# Patient Record
Sex: FEMALE | Race: WHITE | NOT HISPANIC OR LATINO | Employment: OTHER | URBAN - METROPOLITAN AREA
[De-identification: names, ages, dates, MRNs, and addresses within clinical notes are randomized per-mention and may not be internally consistent; named-entity substitution may affect disease eponyms.]

---

## 2017-08-30 ENCOUNTER — RECORDS - HEALTHEAST (OUTPATIENT)
Dept: LAB | Facility: CLINIC | Age: 72
End: 2017-08-30

## 2017-08-30 LAB
CHOLEST SERPL-MCNC: 159 MG/DL
FASTING STATUS PATIENT QL REPORTED: YES
HDLC SERPL-MCNC: 53 MG/DL
LDLC SERPL CALC-MCNC: 87 MG/DL
TRIGL SERPL-MCNC: 93 MG/DL

## 2017-12-28 ENCOUNTER — HOSPITAL ENCOUNTER (OUTPATIENT)
Dept: ULTRASOUND IMAGING | Facility: CLINIC | Age: 72
Discharge: HOME | End: 2017-12-28
Attending: FAMILY MEDICINE

## 2017-12-28 DIAGNOSIS — R60.9 SWELLING: ICD-10-CM

## 2017-12-29 ENCOUNTER — RECORDS - HEALTHEAST (OUTPATIENT)
Dept: ADMINISTRATIVE | Facility: OTHER | Age: 72
End: 2017-12-29

## 2017-12-29 ENCOUNTER — HOSPITAL ENCOUNTER (OUTPATIENT)
Dept: ULTRASOUND IMAGING | Facility: CLINIC | Age: 72
Discharge: HOME | End: 2017-12-29
Attending: FAMILY MEDICINE

## 2017-12-29 DIAGNOSIS — M79.89 FOOT SWELLING: ICD-10-CM

## 2017-12-29 DIAGNOSIS — M25.473 ANKLE SWELLING: ICD-10-CM

## 2017-12-29 DIAGNOSIS — I82.90 BLOOD CLOT IN VEIN: ICD-10-CM

## 2017-12-29 DIAGNOSIS — R20.9 COLD FEET: ICD-10-CM

## 2018-09-06 ENCOUNTER — RECORDS - HEALTHEAST (OUTPATIENT)
Dept: LAB | Facility: CLINIC | Age: 73
End: 2018-09-06

## 2018-09-06 LAB
ALBUMIN SERPL-MCNC: 3.8 G/DL (ref 3.5–5)
ALP SERPL-CCNC: 57 U/L (ref 45–120)
ALT SERPL W P-5'-P-CCNC: 20 U/L (ref 0–45)
ANION GAP SERPL CALCULATED.3IONS-SCNC: 7 MMOL/L (ref 5–18)
AST SERPL W P-5'-P-CCNC: 25 U/L (ref 0–40)
BILIRUB SERPL-MCNC: 1.1 MG/DL (ref 0–1)
BUN SERPL-MCNC: 20 MG/DL (ref 8–28)
CALCIUM SERPL-MCNC: 9.8 MG/DL (ref 8.5–10.5)
CHLORIDE BLD-SCNC: 106 MMOL/L (ref 98–107)
CHOLEST SERPL-MCNC: 152 MG/DL
CO2 SERPL-SCNC: 30 MMOL/L (ref 22–31)
CREAT SERPL-MCNC: 0.78 MG/DL (ref 0.6–1.1)
FASTING STATUS PATIENT QL REPORTED: YES
GFR SERPL CREATININE-BSD FRML MDRD: >60 ML/MIN/1.73M2
GLUCOSE BLD-MCNC: 136 MG/DL (ref 70–125)
HDLC SERPL-MCNC: 59 MG/DL
LDLC SERPL CALC-MCNC: 79 MG/DL
POTASSIUM BLD-SCNC: 4.1 MMOL/L (ref 3.5–5)
PROT SERPL-MCNC: 6 G/DL (ref 6–8)
SODIUM SERPL-SCNC: 143 MMOL/L (ref 136–145)
TRIGL SERPL-MCNC: 70 MG/DL

## 2018-09-11 ENCOUNTER — RECORDS - HEALTHEAST (OUTPATIENT)
Dept: LAB | Facility: CLINIC | Age: 73
End: 2018-09-11

## 2018-09-12 LAB — HCV AB SERPL QL IA: NEGATIVE

## 2019-09-16 ENCOUNTER — RECORDS - HEALTHEAST (OUTPATIENT)
Dept: LAB | Facility: CLINIC | Age: 74
End: 2019-09-16

## 2019-09-16 LAB
ALBUMIN SERPL-MCNC: 3.8 G/DL (ref 3.5–5)
ALP SERPL-CCNC: 57 U/L (ref 45–120)
ALT SERPL W P-5'-P-CCNC: 19 U/L (ref 0–45)
ANION GAP SERPL CALCULATED.3IONS-SCNC: 8 MMOL/L (ref 5–18)
AST SERPL W P-5'-P-CCNC: 29 U/L (ref 0–40)
BILIRUB SERPL-MCNC: 0.9 MG/DL (ref 0–1)
BUN SERPL-MCNC: 18 MG/DL (ref 8–28)
CALCIUM SERPL-MCNC: 9.8 MG/DL (ref 8.5–10.5)
CHLORIDE BLD-SCNC: 105 MMOL/L (ref 98–107)
CHOLEST SERPL-MCNC: 156 MG/DL
CO2 SERPL-SCNC: 30 MMOL/L (ref 22–31)
CREAT SERPL-MCNC: 0.83 MG/DL (ref 0.6–1.1)
FASTING STATUS PATIENT QL REPORTED: NORMAL
GFR SERPL CREATININE-BSD FRML MDRD: >60 ML/MIN/1.73M2
GLUCOSE BLD-MCNC: 148 MG/DL (ref 70–125)
HDLC SERPL-MCNC: 58 MG/DL
LDLC SERPL CALC-MCNC: 82 MG/DL
POTASSIUM BLD-SCNC: 4.1 MMOL/L (ref 3.5–5)
PROT SERPL-MCNC: 6.2 G/DL (ref 6–8)
SODIUM SERPL-SCNC: 143 MMOL/L (ref 136–145)
TRIGL SERPL-MCNC: 80 MG/DL

## 2019-10-22 ENCOUNTER — RECORDS - HEALTHEAST (OUTPATIENT)
Dept: LAB | Facility: CLINIC | Age: 74
End: 2019-10-22

## 2019-10-23 LAB — HBA1C MFR BLD: 5.5 % (ref 4.2–6.1)

## 2020-09-03 ENCOUNTER — RECORDS - HEALTHEAST (OUTPATIENT)
Dept: LAB | Facility: CLINIC | Age: 75
End: 2020-09-03

## 2020-09-03 LAB
CHOLEST SERPL-MCNC: 139 MG/DL
FASTING STATUS PATIENT QL REPORTED: NORMAL
HDLC SERPL-MCNC: 52 MG/DL
LDLC SERPL CALC-MCNC: 73 MG/DL
TRIGL SERPL-MCNC: 70 MG/DL

## 2020-09-05 ENCOUNTER — RECORDS - HEALTHEAST (OUTPATIENT)
Dept: LAB | Facility: CLINIC | Age: 75
End: 2020-09-05

## 2020-09-05 LAB
SHIGA TOXIN 1: NEGATIVE
SHIGA TOXIN 2: NEGATIVE

## 2020-09-07 LAB — BACTERIA SPEC CULT: NORMAL

## 2020-10-20 ENCOUNTER — RECORDS - HEALTHEAST (OUTPATIENT)
Dept: LAB | Facility: CLINIC | Age: 75
End: 2020-10-20

## 2020-10-20 LAB
ALBUMIN SERPL-MCNC: 3.8 G/DL (ref 3.5–5)
ALP SERPL-CCNC: 60 U/L (ref 45–120)
ALT SERPL W P-5'-P-CCNC: 25 U/L (ref 0–45)
ANION GAP SERPL CALCULATED.3IONS-SCNC: 7 MMOL/L (ref 5–18)
AST SERPL W P-5'-P-CCNC: 30 U/L (ref 0–40)
BILIRUB SERPL-MCNC: 1 MG/DL (ref 0–1)
BUN SERPL-MCNC: 20 MG/DL (ref 8–28)
CALCIUM SERPL-MCNC: 9.1 MG/DL (ref 8.5–10.5)
CHLORIDE BLD-SCNC: 104 MMOL/L (ref 98–107)
CO2 SERPL-SCNC: 30 MMOL/L (ref 22–31)
CREAT SERPL-MCNC: 0.77 MG/DL (ref 0.6–1.1)
GFR SERPL CREATININE-BSD FRML MDRD: >60 ML/MIN/1.73M2
GLUCOSE BLD-MCNC: 97 MG/DL (ref 70–125)
POTASSIUM BLD-SCNC: 3.9 MMOL/L (ref 3.5–5)
PROT SERPL-MCNC: 6 G/DL (ref 6–8)
SODIUM SERPL-SCNC: 141 MMOL/L (ref 136–145)

## 2020-10-27 ENCOUNTER — RECORDS - HEALTHEAST (OUTPATIENT)
Dept: LAB | Facility: CLINIC | Age: 75
End: 2020-10-27

## 2020-10-27 LAB
FERRITIN SERPL-MCNC: 32 NG/ML (ref 10–130)
IRON SATN MFR SERPL: 44 % (ref 20–50)
IRON SERPL-MCNC: 155 UG/DL (ref 42–175)
MAGNESIUM SERPL-MCNC: 1.9 MG/DL (ref 1.8–2.6)
TIBC SERPL-MCNC: 355 UG/DL (ref 313–563)
TRANSFERRIN SERPL-MCNC: 284 MG/DL (ref 212–360)

## 2020-10-28 LAB — 25(OH)D3 SERPL-MCNC: 53.5 NG/ML (ref 30–80)

## 2021-04-21 ENCOUNTER — RECORDS - HEALTHEAST (OUTPATIENT)
Dept: ADMINISTRATIVE | Facility: OTHER | Age: 76
End: 2021-04-21

## 2021-04-27 ENCOUNTER — HOSPITAL ENCOUNTER (OUTPATIENT)
Dept: ULTRASOUND IMAGING | Facility: CLINIC | Age: 76
Discharge: HOME | End: 2021-04-27
Attending: FAMILY MEDICINE

## 2021-04-27 DIAGNOSIS — I75.023 PURPLE TOE SYNDROME OF BOTH FEET (H): ICD-10-CM

## 2021-10-21 ENCOUNTER — LAB REQUISITION (OUTPATIENT)
Dept: LAB | Facility: CLINIC | Age: 76
End: 2021-10-21

## 2021-10-21 DIAGNOSIS — I10 ESSENTIAL (PRIMARY) HYPERTENSION: ICD-10-CM

## 2021-10-21 LAB
ALBUMIN SERPL-MCNC: 3.3 G/DL (ref 3.5–5)
ALP SERPL-CCNC: 83 U/L (ref 45–120)
ALT SERPL W P-5'-P-CCNC: 17 U/L (ref 0–45)
ANION GAP SERPL CALCULATED.3IONS-SCNC: 11 MMOL/L (ref 5–18)
AST SERPL W P-5'-P-CCNC: 21 U/L (ref 0–40)
BILIRUB SERPL-MCNC: 0.6 MG/DL (ref 0–1)
BUN SERPL-MCNC: 20 MG/DL (ref 8–28)
CALCIUM SERPL-MCNC: 10.2 MG/DL (ref 8.5–10.5)
CHLORIDE BLD-SCNC: 101 MMOL/L (ref 98–107)
CHOLEST SERPL-MCNC: 140 MG/DL
CO2 SERPL-SCNC: 28 MMOL/L (ref 22–31)
CREAT SERPL-MCNC: 0.78 MG/DL (ref 0.6–1.1)
GFR SERPL CREATININE-BSD FRML MDRD: 75 ML/MIN/1.73M2
GLUCOSE BLD-MCNC: 158 MG/DL (ref 70–125)
HDLC SERPL-MCNC: 47 MG/DL
LDLC SERPL CALC-MCNC: 74 MG/DL
POTASSIUM BLD-SCNC: 4.4 MMOL/L (ref 3.5–5)
PROT SERPL-MCNC: 6 G/DL (ref 6–8)
SODIUM SERPL-SCNC: 140 MMOL/L (ref 136–145)
TRIGL SERPL-MCNC: 93 MG/DL

## 2021-10-21 PROCEDURE — 80053 COMPREHEN METABOLIC PANEL: CPT | Performed by: FAMILY MEDICINE

## 2021-10-21 PROCEDURE — 80061 LIPID PANEL: CPT | Performed by: FAMILY MEDICINE

## 2021-10-26 ENCOUNTER — LAB REQUISITION (OUTPATIENT)
Dept: LAB | Facility: CLINIC | Age: 76
End: 2021-10-26

## 2021-10-26 DIAGNOSIS — R63.4 ABNORMAL WEIGHT LOSS: ICD-10-CM

## 2021-10-26 DIAGNOSIS — M81.0 AGE-RELATED OSTEOPOROSIS WITHOUT CURRENT PATHOLOGICAL FRACTURE: ICD-10-CM

## 2021-10-26 LAB — TSH SERPL DL<=0.005 MIU/L-ACNC: 0.91 UIU/ML (ref 0.3–5)

## 2021-10-26 PROCEDURE — 82306 VITAMIN D 25 HYDROXY: CPT | Performed by: PHYSICIAN ASSISTANT

## 2021-10-26 PROCEDURE — 84443 ASSAY THYROID STIM HORMONE: CPT | Performed by: PHYSICIAN ASSISTANT

## 2021-10-27 ENCOUNTER — HOSPITAL ENCOUNTER (OUTPATIENT)
Dept: MAMMOGRAPHY | Facility: CLINIC | Age: 76
Discharge: HOME | End: 2021-10-27
Attending: PHYSICIAN ASSISTANT | Admitting: PHYSICIAN ASSISTANT
Payer: COMMERCIAL

## 2021-10-27 DIAGNOSIS — Z12.31 VISIT FOR SCREENING MAMMOGRAM: ICD-10-CM

## 2021-10-27 LAB — DEPRECATED CALCIDIOL+CALCIFEROL SERPL-MC: 62 UG/L (ref 30–80)

## 2021-10-27 PROCEDURE — 77063 BREAST TOMOSYNTHESIS BI: CPT

## 2022-10-31 ENCOUNTER — LAB REQUISITION (OUTPATIENT)
Dept: LAB | Facility: CLINIC | Age: 77
End: 2022-10-31

## 2022-10-31 DIAGNOSIS — I10 ESSENTIAL (PRIMARY) HYPERTENSION: ICD-10-CM

## 2022-10-31 LAB
ALBUMIN SERPL BCG-MCNC: 3.9 G/DL (ref 3.5–5.2)
ALP SERPL-CCNC: 86 U/L (ref 35–104)
ALT SERPL W P-5'-P-CCNC: 23 U/L (ref 10–35)
ANION GAP SERPL CALCULATED.3IONS-SCNC: 10 MMOL/L (ref 7–15)
AST SERPL W P-5'-P-CCNC: 26 U/L (ref 10–35)
BILIRUB SERPL-MCNC: 0.6 MG/DL
BUN SERPL-MCNC: 20.2 MG/DL (ref 8–23)
CALCIUM SERPL-MCNC: 9.7 MG/DL (ref 8.8–10.2)
CHLORIDE SERPL-SCNC: 102 MMOL/L (ref 98–107)
CHOLEST SERPL-MCNC: 137 MG/DL
CREAT SERPL-MCNC: 0.79 MG/DL (ref 0.51–0.95)
DEPRECATED HCO3 PLAS-SCNC: 28 MMOL/L (ref 22–29)
GFR SERPL CREATININE-BSD FRML MDRD: 78 ML/MIN/1.73M2
GLUCOSE SERPL-MCNC: 138 MG/DL (ref 70–99)
HDLC SERPL-MCNC: 54 MG/DL
LDLC SERPL CALC-MCNC: 64 MG/DL
NONHDLC SERPL-MCNC: 83 MG/DL
POTASSIUM SERPL-SCNC: 4.3 MMOL/L (ref 3.4–5.3)
PROT SERPL-MCNC: 5.9 G/DL (ref 6.4–8.3)
SODIUM SERPL-SCNC: 140 MMOL/L (ref 136–145)
TRIGL SERPL-MCNC: 95 MG/DL

## 2022-10-31 PROCEDURE — 80061 LIPID PANEL: CPT | Performed by: FAMILY MEDICINE

## 2022-10-31 PROCEDURE — 80053 COMPREHEN METABOLIC PANEL: CPT | Performed by: FAMILY MEDICINE

## 2022-10-31 PROCEDURE — 82040 ASSAY OF SERUM ALBUMIN: CPT | Performed by: FAMILY MEDICINE

## 2022-11-04 ENCOUNTER — LAB REQUISITION (OUTPATIENT)
Dept: LAB | Facility: CLINIC | Age: 77
End: 2022-11-04

## 2022-11-04 DIAGNOSIS — R41.3 OTHER AMNESIA: ICD-10-CM

## 2022-11-04 PROCEDURE — 82607 VITAMIN B-12: CPT | Performed by: FAMILY MEDICINE

## 2022-11-04 PROCEDURE — 84443 ASSAY THYROID STIM HORMONE: CPT | Performed by: FAMILY MEDICINE

## 2022-11-05 LAB
TSH SERPL DL<=0.005 MIU/L-ACNC: 0.8 UIU/ML (ref 0.3–4.2)
VIT B12 SERPL-MCNC: 1115 PG/ML (ref 232–1245)

## 2022-11-11 ENCOUNTER — HOSPITAL ENCOUNTER (OUTPATIENT)
Dept: MAMMOGRAPHY | Facility: CLINIC | Age: 77
Discharge: HOME | End: 2022-11-11
Attending: FAMILY MEDICINE | Admitting: FAMILY MEDICINE
Payer: COMMERCIAL

## 2022-11-11 DIAGNOSIS — Z12.31 VISIT FOR SCREENING MAMMOGRAM: ICD-10-CM

## 2022-11-11 PROCEDURE — 77067 SCR MAMMO BI INCL CAD: CPT

## 2023-02-05 ENCOUNTER — HEALTH MAINTENANCE LETTER (OUTPATIENT)
Age: 78
End: 2023-02-05

## 2023-02-16 ENCOUNTER — TRANSFERRED RECORDS (OUTPATIENT)
Dept: HEALTH INFORMATION MANAGEMENT | Facility: CLINIC | Age: 78
End: 2023-02-16

## 2023-02-20 PROBLEM — D12.2 BENIGN NEOPLASM OF ASCENDING COLON: Status: ACTIVE | Noted: 2022-01-06

## 2023-02-21 ENCOUNTER — TRANSITIONAL CARE UNIT VISIT (OUTPATIENT)
Dept: GERIATRICS | Facility: CLINIC | Age: 78
End: 2023-02-21
Payer: COMMERCIAL

## 2023-02-21 VITALS
DIASTOLIC BLOOD PRESSURE: 66 MMHG | WEIGHT: 114 LBS | OXYGEN SATURATION: 97 % | BODY MASS INDEX: 20.98 KG/M2 | RESPIRATION RATE: 16 BRPM | HEART RATE: 77 BPM | HEIGHT: 62 IN | SYSTOLIC BLOOD PRESSURE: 119 MMHG | TEMPERATURE: 98 F

## 2023-02-21 DIAGNOSIS — Z98.890 S/P ORIF (OPEN REDUCTION INTERNAL FIXATION) FRACTURE: ICD-10-CM

## 2023-02-21 DIAGNOSIS — G25.81 RESTLESS LEGS SYNDROME (RLS): ICD-10-CM

## 2023-02-21 DIAGNOSIS — I10 PRIMARY HYPERTENSION: ICD-10-CM

## 2023-02-21 DIAGNOSIS — D62 ABLA (ACUTE BLOOD LOSS ANEMIA): ICD-10-CM

## 2023-02-21 DIAGNOSIS — S72.002S CLOSED LEFT HIP FRACTURE, SEQUELA: Primary | ICD-10-CM

## 2023-02-21 DIAGNOSIS — Z87.81 S/P ORIF (OPEN REDUCTION INTERNAL FIXATION) FRACTURE: ICD-10-CM

## 2023-02-21 DIAGNOSIS — R52 PAIN: Primary | ICD-10-CM

## 2023-02-21 PROBLEM — F41.9 ANXIETY: Status: ACTIVE | Noted: 2023-02-21

## 2023-02-21 PROBLEM — I95.9 HYPOTENSION: Status: ACTIVE | Noted: 2023-02-20

## 2023-02-21 PROBLEM — S72.002A CLOSED LEFT HIP FRACTURE (H): Status: ACTIVE | Noted: 2023-02-15

## 2023-02-21 PROBLEM — F32.A DEPRESSION: Status: ACTIVE | Noted: 2023-02-21

## 2023-02-21 PROCEDURE — 99310 SBSQ NF CARE HIGH MDM 45: CPT | Performed by: NURSE PRACTITIONER

## 2023-02-21 RX ORDER — ATENOLOL 25 MG/1
25 TABLET ORAL DAILY
COMMUNITY
Start: 2023-02-21 | End: 2023-08-28 | Stop reason: ALTCHOICE

## 2023-02-21 RX ORDER — OXYCODONE HYDROCHLORIDE 5 MG/1
2.5-5 TABLET ORAL EVERY 6 HOURS PRN
COMMUNITY
End: 2023-02-21

## 2023-02-21 RX ORDER — LORAZEPAM 0.5 MG/1
0.5 TABLET ORAL 3 TIMES DAILY PRN
COMMUNITY
End: 2023-03-13

## 2023-02-21 RX ORDER — OXYCODONE HYDROCHLORIDE 5 MG/1
2.5-5 TABLET ORAL EVERY 6 HOURS PRN
Qty: 30 TABLET | Refills: 0 | Status: SHIPPED | OUTPATIENT
Start: 2023-02-21 | End: 2023-02-27

## 2023-02-21 RX ORDER — CYCLOSPORINE 0.5 MG/ML
1 EMULSION OPHTHALMIC EVERY 12 HOURS
COMMUNITY
Start: 2023-02-21

## 2023-02-21 RX ORDER — ACETAMINOPHEN 500 MG
1000 TABLET ORAL EVERY 6 HOURS PRN
COMMUNITY
Start: 2023-02-21 | End: 2023-02-26

## 2023-02-21 RX ORDER — ASPIRIN 81 MG/1
81 TABLET, CHEWABLE ORAL 2 TIMES DAILY WITH MEALS
COMMUNITY
Start: 2023-02-21 | End: 2023-07-24

## 2023-02-21 RX ORDER — PRAMIPEXOLE DIHYDROCHLORIDE 1 MG/1
2 TABLET ORAL AT BEDTIME
COMMUNITY
Start: 2023-02-21

## 2023-02-21 RX ORDER — MIRTAZAPINE 30 MG/1
30 TABLET, FILM COATED ORAL AT BEDTIME
COMMUNITY
Start: 2023-02-21

## 2023-02-21 RX ORDER — ALBUTEROL SULFATE 90 UG/1
1-2 AEROSOL, METERED RESPIRATORY (INHALATION) EVERY 4 HOURS PRN
COMMUNITY
Start: 2023-02-21

## 2023-02-21 RX ORDER — AMOXICILLIN 250 MG
2 CAPSULE ORAL 2 TIMES DAILY
COMMUNITY
Start: 2023-02-21 | End: 2023-07-24

## 2023-02-21 NOTE — PROGRESS NOTES
[facility]    Code Status:  FULL CODE   Visit Type:   Chief Complaint   Patient presents with     Hospital F/U     [facility] 2/15/2023 - 2/20/2023     Facility:  [facility] ([facility]) [[id number]]         HPI: Sharon Weinberg is a 77 year old female who I am seeing today for admit to the TCU. Past medical history included depression, RLS, anxiety and HTN. Pt recently hospitalized on 2/14/23 2/t to fall with left hip pain. Pt hit her head but no LOC. Pt found to have a displaced and angulated comminuted fx of the left femoral neck fracture. Pt underwent ORIF on 2/15/23. Pt treated for pain with oxycodone. ASA for DVT prophylaxis. Pt had hypotension post operatively due to acute blood loss anemia. Hgb dropped from 13.1 to 9.3. She did not require transfusion however did receive fluids for resuscitation.     Transitional Care Course: Today pt sitting up in wheelchair. Recent left hip fracture with ORIF. She is complaining of pain to her hip. She continues on oxycodone and prn tylenol. She denies any dizziness. BP appears satisfactory. Today hgb 9.4. She denies any SOB or CP. She is having regular bowel movements. She is urinating.       Assessment/Plan:     Closed left hip fracture, sequela  S/P ORIF (open reduction internal fixation) fracture  -Pt WBAT.   -Continue oxycodone prn for pain. Also schedule 5 mg BID.   -schedule tylenol 650 mg Q 6 hours.   -ASA 81 mg BID for DVT prophy.   -Surgical bandage to stay in place until follow up with Orthopedics.     ABLA (acute blood loss anemia)  -Hgb dropped from 13.1 to 9.3.   -Today hgb 9.4.   -Monitor.     Primary hypertension  -Low bp in hospital due to ABLA.   -BP appears satisfactory.   -atenolol 25 mg daily.     Restless legs syndrome (RLS)  -pramipexole 0.25 mg Q HS.     OK for PT, OT to eval and treat.   Follow up CBC and BMP.     Active Ambulatory Problems     Diagnosis Date Noted     Benign neoplasm of ascending colon 01/06/2022     History of  colonic polyps 12/07/2016     Cystocele, midline 05/07/2007     Polyp of colon 12/05/2016     Rectocele 05/07/2007     Uterovaginal prolapse, unspecified 05/07/2007     Acute blood loss anemia 02/20/2023     Anxiety 02/21/2023     Closed left hip fracture (H) 02/15/2023     Depression 02/21/2023     HTN (hypertension) 02/21/2023     Hypotension 02/20/2023     RLS (restless legs syndrome) 02/21/2023     Resolved Ambulatory Problems     Diagnosis Date Noted     No Resolved Ambulatory Problems     No Additional Past Medical History     Allergies   Allergen Reactions     Penicillins Palpitations, Anaphylaxis, Hives and Difficulty breathing     Cephalexin Hives     Doxycycline Itching and Rash     Sulfa Drugs Hives     Sulfatolamide Hives       All Meds and Allergies reviewed in the record at the facility and is the most up-to-date.    Post Discharge Medication Reconciliation Status: discharge medications reconciled and changed, per note/orders  Current Outpatient Medications   Medication Sig     acetaminophen (TYLENOL) 500 MG tablet Take 1,000 mg by mouth every 6 hours as needed     albuterol (PROAIR HFA/PROVENTIL HFA/VENTOLIN HFA) 108 (90 Base) MCG/ACT inhaler Inhale 1-2 puffs into the lungs every 4 hours as needed     aspirin (ASA) 81 MG chewable tablet Take 81 mg by mouth 2 times daily (with meals)     atenolol (TENORMIN) 25 MG tablet Take 25 mg by mouth daily     cycloSPORINE (RESTASIS) 0.05 % ophthalmic emulsion Place 1 drop into both eyes every 12 hours     LORazepam (ATIVAN) 0.5 MG tablet Take 0.5 mg by mouth 3 times daily as needed for anxiety     mirtazapine (REMERON) 30 MG tablet Take 30 mg by mouth At Bedtime     Multiple Vitamins-Minerals (MULTI VITAMIN  /MINERALS) TABS Take 1 tablet by mouth daily     oxyCODONE (ROXICODONE) 5 MG tablet Take 0.5-1 tablets (2.5-5 mg) by mouth every 6 hours as needed for severe pain (7-10)     pramipexole (MIRAPEX) 0.25 MG tablet Take 0.25 mg by mouth At Bedtime      "senna-docusate (SENOKOT-S/PERICOLACE) 8.6-50 MG tablet Take 2 tablets by mouth 2 times daily     UNABLE TO FIND Take 1 tablet by mouth 2 times daily MEDICATION NAME: Algaecal tablet; 750 mg calcium, 65 mg magnesium, 25 mcg vitamin D3     No current facility-administered medications for this visit.       REVIEW OF SYSTEMS:   10 point review of systems reviewed and pertinent positives in the HPI.     PHYSICAL EXAMINATION:  Physical Exam     Vital signs: /66   Pulse 77   Temp 98  F (36.7  C)   Resp 16   Ht 1.575 m (5' 2\")   Wt 51.7 kg (114 lb)   SpO2 97%   BMI 20.85 kg/m    General: Awake, Alert, oriented x3, appropriately, sitting up in wheelchair, simple commands, conversant  HEENT:Pink conjunctiva, anicteric sclerae, moist oral mucosa  NECK: Supple  CVS:  S1  S2, without murmur or gallop.   LUNG: Clear to auscultation, No wheezes, rales or rhonci.  BACK: No kyphosis of the thoracic spine  ABDOMEN: Soft, nontender to palpation, with positive bowel sounds  EXTREMITIES: Moves both upper and lower extremities with some limitation to LLE, 1+ pedal edema on the operative side, no calf tenderness  SKIN: Incision to left hip covered with surgical bandage.   NEUROLOGIC: Intact, pulses palpable  PSYCHIATRIC: Cognition intact      Labs:  All labs reviewed in the nursing home record and Epic   @No results found for: WBC  No results found for: RBC  No results found for: HGB  No results found for: HCT  No results found for: MCV  No results found for: MCH  No results found for: MCHC  No results found for: RDW  No results found for: PLT     @Last Comprehensive Metabolic Panel:  Sodium   Date Value Ref Range Status   10/31/2022 140 136 - 145 mmol/L Final     Potassium   Date Value Ref Range Status   10/31/2022 4.3 3.4 - 5.3 mmol/L Final   10/21/2021 4.4 3.5 - 5.0 mmol/L Final     Chloride   Date Value Ref Range Status   10/31/2022 102 98 - 107 mmol/L Final   10/21/2021 101 98 - 107 mmol/L Final     Carbon Dioxide (CO2) "   Date Value Ref Range Status   10/31/2022 28 22 - 29 mmol/L Final   10/21/2021 28 22 - 31 mmol/L Final     Anion Gap   Date Value Ref Range Status   10/31/2022 10 7 - 15 mmol/L Final   10/21/2021 11 5 - 18 mmol/L Final     Glucose   Date Value Ref Range Status   10/31/2022 138 (H) 70 - 99 mg/dL Final   10/21/2021 158 (H) 70 - 125 mg/dL Final     Urea Nitrogen   Date Value Ref Range Status   10/31/2022 20.2 8.0 - 23.0 mg/dL Final   10/21/2021 20 8 - 28 mg/dL Final     Creatinine   Date Value Ref Range Status   10/31/2022 0.79 0.51 - 0.95 mg/dL Final     GFR Estimate   Date Value Ref Range Status   10/31/2022 78 >60 mL/min/1.73m2 Final     Comment:     Effective December 21, 2021 eGFRcr in adults is calculated using the 2021 CKD-EPI creatinine equation which includes age and gender (Feliberto et al., NEJM, DOI: 10.1056/YPZTof0935122)   10/20/2020 >60 >60 mL/min/1.73m2 Final     Calcium   Date Value Ref Range Status   10/31/2022 9.7 8.8 - 10.2 mg/dL Final     Time spent for this visit was 45 minutes which included preparing for visit, reviewing H &P, operative notes, labs, mediations as well as face to face time and collaboration with nursing staff.      At the conclusion of the encounter I discussed  the results of all of the tests and the disposition with patient.   All questions were answered.  The patient acknowledged understanding and was involved in the decision making regarding the overall care plan.        This note has been dictated using voice recognition software. Any grammatical or context distortions are unintentional and inherent to the software    Electronically signed by: Penelope Hendrix CNP

## 2023-02-21 NOTE — LETTER
2/21/2023        RE: Sharon Weinberg  [address]  [facility] [address]         [facility]    Code Status:  FULL CODE   Visit Type:   Chief Complaint   Patient presents with     Hospital F/U     [facility] 2/15/2023 - 2/20/2023     Facility:  [facility] ([facility]) [[id number]]         HPI: Sharon Weinberg is a 77 year old female who I am seeing today for admit to the TCU. Past medical history included depression, RLS, anxiety and HTN. Pt recently hospitalized on 2/14/23 2/t to fall with left hip pain. Pt hit her head but no LOC. Pt found to have a displaced and angulated comminuted fx of the left femoral neck fracture. Pt underwent ORIF on 2/15/23. Pt treated for pain with oxycodone. ASA for DVT prophylaxis. Pt had hypotension post operatively due to acute blood loss anemia. Hgb dropped from 13.1 to 9.3. She did not require transfusion however did receive fluids for resuscitation.     Transitional Care Course: Today pt sitting up in wheelchair. Recent left hip fracture with ORIF. She is complaining of pain to her hip. She continues on oxycodone and prn tylenol. She denies any dizziness. BP appears satisfactory. Today hgb 9.4. She denies any SOB or CP. She is having regular bowel movements. She is urinating.       Assessment/Plan:     Closed left hip fracture, sequela  S/P ORIF (open reduction internal fixation) fracture  -Pt WBAT.   -Continue oxycodone prn for pain. Also schedule 5 mg BID.   -schedule tylenol 650 mg Q 6 hours.   -ASA 81 mg BID for DVT prophy.   -Surgical bandage to stay in place until follow up with Orthopedics.     ABLA (acute blood loss anemia)  -Hgb dropped from 13.1 to 9.3.   -Today hgb 9.4.   -Monitor.     Primary hypertension  -Low bp in hospital due to ABLA.   -BP appears satisfactory.   -atenolol 25 mg daily.     Restless legs syndrome (RLS)  -pramipexole 0.25 mg Q HS.     OK for PT, OT to eval and treat.   Follow up CBC and BMP.     Active Ambulatory  Problems     Diagnosis Date Noted     Benign neoplasm of ascending colon 01/06/2022     History of colonic polyps 12/07/2016     Cystocele, midline 05/07/2007     Polyp of colon 12/05/2016     Rectocele 05/07/2007     Uterovaginal prolapse, unspecified 05/07/2007     Acute blood loss anemia 02/20/2023     Anxiety 02/21/2023     Closed left hip fracture (H) 02/15/2023     Depression 02/21/2023     HTN (hypertension) 02/21/2023     Hypotension 02/20/2023     RLS (restless legs syndrome) 02/21/2023     Resolved Ambulatory Problems     Diagnosis Date Noted     No Resolved Ambulatory Problems     No Additional Past Medical History     Allergies   Allergen Reactions     Penicillins Palpitations, Anaphylaxis, Hives and Difficulty breathing     Cephalexin Hives     Doxycycline Itching and Rash     Sulfa Drugs Hives     Sulfatolamide Hives       All Meds and Allergies reviewed in the record at the facility and is the most up-to-date.    Post Discharge Medication Reconciliation Status: discharge medications reconciled and changed, per note/orders  Current Outpatient Medications   Medication Sig     acetaminophen (TYLENOL) 500 MG tablet Take 1,000 mg by mouth every 6 hours as needed     albuterol (PROAIR HFA/PROVENTIL HFA/VENTOLIN HFA) 108 (90 Base) MCG/ACT inhaler Inhale 1-2 puffs into the lungs every 4 hours as needed     aspirin (ASA) 81 MG chewable tablet Take 81 mg by mouth 2 times daily (with meals)     atenolol (TENORMIN) 25 MG tablet Take 25 mg by mouth daily     cycloSPORINE (RESTASIS) 0.05 % ophthalmic emulsion Place 1 drop into both eyes every 12 hours     LORazepam (ATIVAN) 0.5 MG tablet Take 0.5 mg by mouth 3 times daily as needed for anxiety     mirtazapine (REMERON) 30 MG tablet Take 30 mg by mouth At Bedtime     Multiple Vitamins-Minerals (MULTI VITAMIN  /MINERALS) TABS Take 1 tablet by mouth daily     oxyCODONE (ROXICODONE) 5 MG tablet Take 0.5-1 tablets (2.5-5 mg) by mouth every 6 hours as needed for severe  "pain (7-10)     pramipexole (MIRAPEX) 0.25 MG tablet Take 0.25 mg by mouth At Bedtime     senna-docusate (SENOKOT-S/PERICOLACE) 8.6-50 MG tablet Take 2 tablets by mouth 2 times daily     UNABLE TO FIND Take 1 tablet by mouth 2 times daily MEDICATION NAME: Algaecal tablet; 750 mg calcium, 65 mg magnesium, 25 mcg vitamin D3     No current facility-administered medications for this visit.       REVIEW OF SYSTEMS:   10 point review of systems reviewed and pertinent positives in the HPI.     PHYSICAL EXAMINATION:  Physical Exam     Vital signs: /66   Pulse 77   Temp 98  F (36.7  C)   Resp 16   Ht 1.575 m (5' 2\")   Wt 51.7 kg (114 lb)   SpO2 97%   BMI 20.85 kg/m    General: Awake, Alert, oriented x3, appropriately, sitting up in wheelchair, simple commands, conversant  HEENT:Pink conjunctiva, anicteric sclerae, moist oral mucosa  NECK: Supple  CVS:  S1  S2, without murmur or gallop.   LUNG: Clear to auscultation, No wheezes, rales or rhonci.  BACK: No kyphosis of the thoracic spine  ABDOMEN: Soft, nontender to palpation, with positive bowel sounds  EXTREMITIES: Moves both upper and lower extremities with some limitation to LLE, 1+ pedal edema on the operative side, no calf tenderness  SKIN: Incision to left hip covered with surgical bandage.   NEUROLOGIC: Intact, pulses palpable  PSYCHIATRIC: Cognition intact      Labs:  All labs reviewed in the nursing home record and Epic   @No results found for: WBC  No results found for: RBC  No results found for: HGB  No results found for: HCT  No results found for: MCV  No results found for: MCH  No results found for: MCHC  No results found for: RDW  No results found for: PLT     @Last Comprehensive Metabolic Panel:  Sodium   Date Value Ref Range Status   10/31/2022 140 136 - 145 mmol/L Final     Potassium   Date Value Ref Range Status   10/31/2022 4.3 3.4 - 5.3 mmol/L Final   10/21/2021 4.4 3.5 - 5.0 mmol/L Final     Chloride   Date Value Ref Range Status   10/31/2022 " 102 98 - 107 mmol/L Final   10/21/2021 101 98 - 107 mmol/L Final     Carbon Dioxide (CO2)   Date Value Ref Range Status   10/31/2022 28 22 - 29 mmol/L Final   10/21/2021 28 22 - 31 mmol/L Final     Anion Gap   Date Value Ref Range Status   10/31/2022 10 7 - 15 mmol/L Final   10/21/2021 11 5 - 18 mmol/L Final     Glucose   Date Value Ref Range Status   10/31/2022 138 (H) 70 - 99 mg/dL Final   10/21/2021 158 (H) 70 - 125 mg/dL Final     Urea Nitrogen   Date Value Ref Range Status   10/31/2022 20.2 8.0 - 23.0 mg/dL Final   10/21/2021 20 8 - 28 mg/dL Final     Creatinine   Date Value Ref Range Status   10/31/2022 0.79 0.51 - 0.95 mg/dL Final     GFR Estimate   Date Value Ref Range Status   10/31/2022 78 >60 mL/min/1.73m2 Final     Comment:     Effective December 21, 2021 eGFRcr in adults is calculated using the 2021 CKD-EPI creatinine equation which includes age and gender (Feliberto et al., NEJM, DOI: 10.1056/PBVVsp8750917)   10/20/2020 >60 >60 mL/min/1.73m2 Final     Calcium   Date Value Ref Range Status   10/31/2022 9.7 8.8 - 10.2 mg/dL Final     Time spent for this visit was 45 minutes which included preparing for visit, reviewing H &P, operative notes, labs, mediations as well as face to face time and collaboration with nursing staff.      At the conclusion of the encounter I discussed  the results of all of the tests and the disposition with patient.   All questions were answered.  The patient acknowledged understanding and was involved in the decision making regarding the overall care plan.        This note has been dictated using voice recognition software. Any grammatical or context distortions are unintentional and inherent to the software    Electronically signed by: Penelope Hendrix CNP         Sincerely,        Penelope Hendrix NP

## 2023-02-22 ENCOUNTER — LAB REQUISITION (OUTPATIENT)
Dept: LAB | Facility: CLINIC | Age: 78
End: 2023-02-22
Payer: COMMERCIAL

## 2023-02-22 DIAGNOSIS — Z47.89 ENCOUNTER FOR OTHER ORTHOPEDIC AFTERCARE: ICD-10-CM

## 2023-02-23 LAB
ANION GAP SERPL CALCULATED.3IONS-SCNC: 8 MMOL/L (ref 7–15)
BUN SERPL-MCNC: 15 MG/DL (ref 8–23)
CALCIUM SERPL-MCNC: 8.6 MG/DL (ref 8.8–10.2)
CHLORIDE SERPL-SCNC: 108 MMOL/L (ref 98–107)
CREAT SERPL-MCNC: 0.68 MG/DL (ref 0.51–0.95)
DEPRECATED HCO3 PLAS-SCNC: 24 MMOL/L (ref 22–29)
ERYTHROCYTE [DISTWIDTH] IN BLOOD BY AUTOMATED COUNT: 13.8 % (ref 10–15)
GFR SERPL CREATININE-BSD FRML MDRD: 90 ML/MIN/1.73M2
GLUCOSE SERPL-MCNC: 89 MG/DL (ref 70–99)
HCT VFR BLD AUTO: 27.5 % (ref 35–47)
HGB BLD-MCNC: 8.4 G/DL (ref 11.7–15.7)
MCH RBC QN AUTO: 31.8 PG (ref 26.5–33)
MCHC RBC AUTO-ENTMCNC: 30.5 G/DL (ref 31.5–36.5)
MCV RBC AUTO: 104 FL (ref 78–100)
PLATELET # BLD AUTO: 275 10E3/UL (ref 150–450)
POTASSIUM SERPL-SCNC: 4.1 MMOL/L (ref 3.4–5.3)
RBC # BLD AUTO: 2.64 10E6/UL (ref 3.8–5.2)
SODIUM SERPL-SCNC: 140 MMOL/L (ref 136–145)
WBC # BLD AUTO: 8.1 10E3/UL (ref 4–11)

## 2023-02-23 PROCEDURE — P9603 ONE-WAY ALLOW PRORATED MILES: HCPCS | Mod: ORL | Performed by: NURSE PRACTITIONER

## 2023-02-23 PROCEDURE — 85027 COMPLETE CBC AUTOMATED: CPT | Mod: ORL | Performed by: NURSE PRACTITIONER

## 2023-02-23 PROCEDURE — 36415 COLL VENOUS BLD VENIPUNCTURE: CPT | Mod: ORL | Performed by: NURSE PRACTITIONER

## 2023-02-23 PROCEDURE — 80048 BASIC METABOLIC PNL TOTAL CA: CPT | Mod: ORL | Performed by: NURSE PRACTITIONER

## 2023-02-24 ENCOUNTER — DOCUMENTATION ONLY (OUTPATIENT)
Dept: GERIATRICS | Facility: CLINIC | Age: 78
End: 2023-02-24
Payer: COMMERCIAL

## 2023-02-24 DIAGNOSIS — R52 PAIN: Primary | ICD-10-CM

## 2023-02-24 RX ORDER — OXYCODONE HYDROCHLORIDE 5 MG/1
5 TABLET ORAL 2 TIMES DAILY
COMMUNITY
End: 2023-02-24

## 2023-02-26 RX ORDER — ACETAMINOPHEN 325 MG/1
650 TABLET ORAL EVERY 6 HOURS PRN
COMMUNITY

## 2023-02-26 RX ORDER — OXYCODONE HYDROCHLORIDE 5 MG/1
5 TABLET ORAL 2 TIMES DAILY
Qty: 30 TABLET | Refills: 0 | Status: SHIPPED | OUTPATIENT
Start: 2023-02-26 | End: 2023-02-27

## 2023-02-27 ENCOUNTER — TRANSITIONAL CARE UNIT VISIT (OUTPATIENT)
Dept: GERIATRICS | Facility: CLINIC | Age: 78
End: 2023-02-27
Payer: COMMERCIAL

## 2023-02-27 ENCOUNTER — LAB REQUISITION (OUTPATIENT)
Dept: LAB | Facility: CLINIC | Age: 78
End: 2023-02-27
Payer: COMMERCIAL

## 2023-02-27 VITALS
HEART RATE: 87 BPM | TEMPERATURE: 97.5 F | BODY MASS INDEX: 20.98 KG/M2 | SYSTOLIC BLOOD PRESSURE: 156 MMHG | DIASTOLIC BLOOD PRESSURE: 74 MMHG | HEIGHT: 62 IN | WEIGHT: 114 LBS | RESPIRATION RATE: 18 BRPM | OXYGEN SATURATION: 93 %

## 2023-02-27 DIAGNOSIS — S72.002S CLOSED LEFT HIP FRACTURE, SEQUELA: Primary | ICD-10-CM

## 2023-02-27 DIAGNOSIS — D62 ABLA (ACUTE BLOOD LOSS ANEMIA): ICD-10-CM

## 2023-02-27 DIAGNOSIS — D62 ACUTE POSTHEMORRHAGIC ANEMIA: ICD-10-CM

## 2023-02-27 DIAGNOSIS — G25.81 RESTLESS LEGS SYNDROME (RLS): ICD-10-CM

## 2023-02-27 DIAGNOSIS — R52 PAIN: ICD-10-CM

## 2023-02-27 DIAGNOSIS — I10 PRIMARY HYPERTENSION: ICD-10-CM

## 2023-02-27 DIAGNOSIS — Z87.81 S/P ORIF (OPEN REDUCTION INTERNAL FIXATION) FRACTURE: ICD-10-CM

## 2023-02-27 DIAGNOSIS — Z98.890 S/P ORIF (OPEN REDUCTION INTERNAL FIXATION) FRACTURE: ICD-10-CM

## 2023-02-27 PROCEDURE — 99309 SBSQ NF CARE MODERATE MDM 30: CPT | Performed by: NURSE PRACTITIONER

## 2023-02-27 RX ORDER — OXYCODONE HYDROCHLORIDE 5 MG/1
TABLET ORAL
Qty: 30 TABLET | Refills: 0 | Status: SHIPPED | OUTPATIENT
Start: 2023-02-27 | End: 2023-03-13

## 2023-02-27 NOTE — LETTER
2/27/2023        RE: Sharon Weinberg  [address]  [facility] [place] [address]         [facility]    Code Status:  FULL CODE   Visit Type:   Chief Complaint   Patient presents with     TCU Follow Up     Facility:  [facility]) [[id number]]         HPI: Sharon Weinberg is a 77 year old female who I am seeing today for follow-up on the TCU. Past medical history included depression, RLS, anxiety and HTN. Pt recently hospitalized on 2/14/23 2/t to fall with left hip pain. Pt hit her head but no LOC. Pt found to have a displaced and angulated comminuted fx of the left femoral neck fracture. Pt underwent ORIF on 2/15/23. Pt treated for pain with oxycodone. ASA for DVT prophylaxis. Pt had hypotension post operatively due to acute blood loss anemia. Hgb dropped from 13.1 to 9.3. She did not require transfusion however did receive fluids for resuscitation.     Transitional Care Course: Today pt sitting up in wheelchair. Recent left hip fracture with ORIF.  Pain controlled with oxycodone and Tylenol.  She is moving slightly better from last visit.  She continues with some bruising around the lower incision and knee.  She is on aspirin for DVT prophylactics.  Recent hemoglobin 9.4.    Assessment/Plan: Reviewed with changes.    Closed left hip fracture, sequela  S/P ORIF (open reduction internal fixation) fracture  -Pt WBAT.   -Pain control with oxycodone and Tylenol.  -ASA 81 mg BID for DVT prophy.   -Surgical bandage to stay in place until follow up with Orthopedics.   -Bruising to the distal aspect of incision.  Ice every 2 hours for 20 minutes outside of therapy.  -Follow-up with phone visit with Ortho in the a.m.  Nursing to remove sutures or staples.  Then repeat follow-up on 3/30/2023 with Ortho.    ABLA (acute blood loss anemia)  -Hgb 9.4  -Follow-up hemoglobin on Thursday.      Primary hypertension  -atenolol 25 mg daily.     Restless legs syndrome (RLS)  -pramipexole 0.25 mg  Q HS.         Active Ambulatory Problems     Diagnosis Date Noted     Benign neoplasm of ascending colon 01/06/2022     History of colonic polyps 12/07/2016     Cystocele, midline 05/07/2007     Polyp of colon 12/05/2016     Rectocele 05/07/2007     Uterovaginal prolapse, unspecified 05/07/2007     Acute blood loss anemia 02/20/2023     Anxiety 02/21/2023     Closed left hip fracture (H) 02/15/2023     Depression 02/21/2023     HTN (hypertension) 02/21/2023     Hypotension 02/20/2023     RLS (restless legs syndrome) 02/21/2023     Resolved Ambulatory Problems     Diagnosis Date Noted     No Resolved Ambulatory Problems     No Additional Past Medical History     Allergies   Allergen Reactions     Penicillins Palpitations, Anaphylaxis, Hives and Difficulty breathing     Cephalexin Hives     Doxycycline Itching and Rash     Sulfa Drugs Hives     Sulfatolamide Hives       All Meds and Allergies reviewed in the record at the facility and is the most up-to-date.    Current Outpatient Medications   Medication Sig     acetaminophen (TYLENOL) 325 MG tablet Take 650 mg by mouth every 6 hours as needed for mild pain     albuterol (PROAIR HFA/PROVENTIL HFA/VENTOLIN HFA) 108 (90 Base) MCG/ACT inhaler Inhale 1-2 puffs into the lungs every 4 hours as needed     aspirin (ASA) 81 MG chewable tablet Take 81 mg by mouth 2 times daily (with meals)     atenolol (TENORMIN) 25 MG tablet Take 25 mg by mouth daily     cycloSPORINE (RESTASIS) 0.05 % ophthalmic emulsion Place 1 drop into both eyes every 12 hours     LORazepam (ATIVAN) 0.5 MG tablet Take 0.5 mg by mouth 3 times daily as needed for anxiety     mirtazapine (REMERON) 30 MG tablet Take 30 mg by mouth At Bedtime     Multiple Vitamins-Minerals (MULTI VITAMIN  /MINERALS) TABS Take 1 tablet by mouth daily     oxyCODONE (ROXICODONE) 5 MG tablet Take 1 tablet (5 mg) by mouth 2 times daily. May also take 0.5-1 tablets (2.5-5 mg) every 6 hours as needed for severe pain (7-10).      "pramipexole (MIRAPEX) 0.25 MG tablet Take 0.25 mg by mouth At Bedtime     senna-docusate (SENOKOT-S/PERICOLACE) 8.6-50 MG tablet Take 2 tablets by mouth 2 times daily     UNABLE TO FIND Take 1 tablet by mouth 2 times daily MEDICATION NAME: Algaecal tablet; 750 mg calcium, 65 mg magnesium, 25 mcg vitamin D3     No current facility-administered medications for this visit.       REVIEW OF SYSTEMS:   10 point review of systems reviewed and pertinent positives in the HPI.     PHYSICAL EXAMINATION:  Physical Exam     Vital signs: BP (!) 156/74   Pulse 87   Temp 97.5  F (36.4  C)   Resp 18   Ht 1.575 m (5' 2\")   Wt 51.7 kg (114 lb)   SpO2 93%   BMI 20.85 kg/m    General: Awake, Alert, oriented x3, appropriately, sitting up in wheelchair, simple commands, conversant  HEENT:Pink conjunctiva, anicteric sclerae, moist oral mucosa  NECK: Supple  CVS:  S1  S2, without murmur or gallop.   LUNG: Clear to auscultation, No wheezes, rales or rhonci.  BACK: No kyphosis of the thoracic spine  ABDOMEN: Soft,  with positive bowel sounds  EXTREMITIES: Moves both upper and lower extremities with some limitation to LLE, 1+ pedal edema on the operative side, no calf tenderness  SKIN: Incision to left hip covered with surgical bandage.  Bruising to the distal aspect of the incision around the knee.  NEUROLOGIC: Intact, pulses palpable  PSYCHIATRIC: Cognition intact      Labs:  All labs reviewed in the nursing home record and [facility]     @Last Comprehensive Metabolic Panel:  Sodium   Date Value Ref Range Status   02/23/2023 140 136 - 145 mmol/L Final     Potassium   Date Value Ref Range Status   02/23/2023 4.1 3.4 - 5.3 mmol/L Final   10/21/2021 4.4 3.5 - 5.0 mmol/L Final     Chloride   Date Value Ref Range Status   02/23/2023 108 (H) 98 - 107 mmol/L Final   10/21/2021 101 98 - 107 mmol/L Final     Carbon Dioxide (CO2)   Date Value Ref Range Status   02/23/2023 24 22 - 29 mmol/L Final   10/21/2021 28 22 - 31 mmol/L Final     Anion Gap "   Date Value Ref Range Status   02/23/2023 8 7 - 15 mmol/L Final   10/21/2021 11 5 - 18 mmol/L Final     Glucose   Date Value Ref Range Status   02/23/2023 89 70 - 99 mg/dL Final   10/21/2021 158 (H) 70 - 125 mg/dL Final     Urea Nitrogen   Date Value Ref Range Status   02/23/2023 15.0 8.0 - 23.0 mg/dL Final   10/21/2021 20 8 - 28 mg/dL Final     Creatinine   Date Value Ref Range Status   02/23/2023 0.68 0.51 - 0.95 mg/dL Final     GFR Estimate   Date Value Ref Range Status   02/23/2023 90 >60 mL/min/1.73m2 Final     Comment:     eGFR calculated using 2021 CKD-EPI equation.   10/20/2020 >60 >60 mL/min/1.73m2 Final     Calcium   Date Value Ref Range Status   02/23/2023 8.6 (L) 8.8 - 10.2 mg/dL Final     This note has been dictated using voice recognition software. Any grammatical or context distortions are unintentional and inherent to the software    Electronically signed by: Penelope Hendrix, [facility]         Sincerely,        Penelope Hendrix, NP

## 2023-02-28 LAB — HGB BLD-MCNC: 10.1 G/DL (ref 11.7–15.7)

## 2023-02-28 PROCEDURE — 85018 HEMOGLOBIN: CPT | Mod: ORL | Performed by: FAMILY MEDICINE

## 2023-02-28 PROCEDURE — P9604 ONE-WAY ALLOW PRORATED TRIP: HCPCS | Mod: ORL | Performed by: FAMILY MEDICINE

## 2023-02-28 PROCEDURE — 36415 COLL VENOUS BLD VENIPUNCTURE: CPT | Mod: ORL | Performed by: FAMILY MEDICINE

## 2023-02-28 NOTE — PROGRESS NOTES
[facility] GERIATRIC SERVICES    Code Status:  FULL CODE   Visit Type:   Chief Complaint   Patient presents with     TCU Follow Up     Facility:  [facility] ([facility]) [[id number]]         HPI: Sharon Weinberg is a 77 year old female who I am seeing today for follow-up on the TCU. Past medical history included depression, RLS, anxiety and HTN. Pt recently hospitalized on 2/14/23 2/t to fall with left hip pain. Pt hit her head but no LOC. Pt found to have a displaced and angulated comminuted fx of the left femoral neck fracture. Pt underwent ORIF on 2/15/23. Pt treated for pain with oxycodone. ASA for DVT prophylaxis. Pt had hypotension post operatively due to acute blood loss anemia. Hgb dropped from 13.1 to 9.3. She did not require transfusion however did receive fluids for resuscitation.     Transitional Care Course: Today pt sitting up in wheelchair. Recent left hip fracture with ORIF.  Pain controlled with oxycodone and Tylenol.  She is moving slightly better from last visit.  She continues with some bruising around the lower incision and knee.  She is on aspirin for DVT prophylactics.  Recent hemoglobin 9.4.    Assessment/Plan: Reviewed with changes.    Closed left hip fracture, sequela  S/P ORIF (open reduction internal fixation) fracture  -Pt WBAT.   -Pain control with oxycodone and Tylenol.  -ASA 81 mg BID for DVT prophy.   -Surgical bandage to stay in place until follow up with Orthopedics.   -Bruising to the distal aspect of incision.  Ice every 2 hours for 20 minutes outside of therapy.  -Follow-up with phone visit with Ortho in the a.m.  Nursing to remove sutures or staples.  Then repeat follow-up on 3/30/2023 with Ortho.    ABLA (acute blood loss anemia)  -Hgb 9.4  -Follow-up hemoglobin on Thursday.      Primary hypertension  -atenolol 25 mg daily.     Restless legs syndrome (RLS)  -pramipexole 0.25 mg Q HS.         Active Ambulatory Problems     Diagnosis Date Noted     Benign neoplasm of  ascending colon 01/06/2022     History of colonic polyps 12/07/2016     Cystocele, midline 05/07/2007     Polyp of colon 12/05/2016     Rectocele 05/07/2007     Uterovaginal prolapse, unspecified 05/07/2007     Acute blood loss anemia 02/20/2023     Anxiety 02/21/2023     Closed left hip fracture (H) 02/15/2023     Depression 02/21/2023     HTN (hypertension) 02/21/2023     Hypotension 02/20/2023     RLS (restless legs syndrome) 02/21/2023     Resolved Ambulatory Problems     Diagnosis Date Noted     No Resolved Ambulatory Problems     No Additional Past Medical History     Allergies   Allergen Reactions     Penicillins Palpitations, Anaphylaxis, Hives and Difficulty breathing     Cephalexin Hives     Doxycycline Itching and Rash     Sulfa Drugs Hives     Sulfatolamide Hives       All Meds and Allergies reviewed in the record at the facility and is the most up-to-date.    Current Outpatient Medications   Medication Sig     acetaminophen (TYLENOL) 325 MG tablet Take 650 mg by mouth every 6 hours as needed for mild pain     albuterol (PROAIR HFA/PROVENTIL HFA/VENTOLIN HFA) 108 (90 Base) MCG/ACT inhaler Inhale 1-2 puffs into the lungs every 4 hours as needed     aspirin (ASA) 81 MG chewable tablet Take 81 mg by mouth 2 times daily (with meals)     atenolol (TENORMIN) 25 MG tablet Take 25 mg by mouth daily     cycloSPORINE (RESTASIS) 0.05 % ophthalmic emulsion Place 1 drop into both eyes every 12 hours     LORazepam (ATIVAN) 0.5 MG tablet Take 0.5 mg by mouth 3 times daily as needed for anxiety     mirtazapine (REMERON) 30 MG tablet Take 30 mg by mouth At Bedtime     Multiple Vitamins-Minerals (MULTI VITAMIN  /MINERALS) TABS Take 1 tablet by mouth daily     oxyCODONE (ROXICODONE) 5 MG tablet Take 1 tablet (5 mg) by mouth 2 times daily. May also take 0.5-1 tablets (2.5-5 mg) every 6 hours as needed for severe pain (7-10).     pramipexole (MIRAPEX) 0.25 MG tablet Take 0.25 mg by mouth At Bedtime     senna-docusate  "(SENOKOT-S/PERICOLACE) 8.6-50 MG tablet Take 2 tablets by mouth 2 times daily     UNABLE TO FIND Take 1 tablet by mouth 2 times daily MEDICATION NAME: Algaecal tablet; 750 mg calcium, 65 mg magnesium, 25 mcg vitamin D3     No current facility-administered medications for this visit.       REVIEW OF SYSTEMS:   10 point review of systems reviewed and pertinent positives in the HPI.     PHYSICAL EXAMINATION:  Physical Exam     Vital signs: BP (!) 156/74   Pulse 87   Temp 97.5  F (36.4  C)   Resp 18   Ht 1.575 m (5' 2\")   Wt 51.7 kg (114 lb)   SpO2 93%   BMI 20.85 kg/m    General: Awake, Alert, oriented x3, appropriately, sitting up in wheelchair, simple commands, conversant  HEENT:Pink conjunctiva, anicteric sclerae, moist oral mucosa  NECK: Supple  CVS:  S1  S2, without murmur or gallop.   LUNG: Clear to auscultation, No wheezes, rales or rhonci.  BACK: No kyphosis of the thoracic spine  ABDOMEN: Soft,  with positive bowel sounds  EXTREMITIES: Moves both upper and lower extremities with some limitation to LLE, 1+ pedal edema on the operative side, no calf tenderness  SKIN: Incision to left hip covered with surgical bandage.  Bruising to the distal aspect of the incision around the knee.  NEUROLOGIC: Intact, pulses palpable  PSYCHIATRIC: Cognition intact      Labs:  All labs reviewed in the nursing home record and [facility]     @Last Comprehensive Metabolic Panel:  Sodium   Date Value Ref Range Status   02/23/2023 140 136 - 145 mmol/L Final     Potassium   Date Value Ref Range Status   02/23/2023 4.1 3.4 - 5.3 mmol/L Final   10/21/2021 4.4 3.5 - 5.0 mmol/L Final     Chloride   Date Value Ref Range Status   02/23/2023 108 (H) 98 - 107 mmol/L Final   10/21/2021 101 98 - 107 mmol/L Final     Carbon Dioxide (CO2)   Date Value Ref Range Status   02/23/2023 24 22 - 29 mmol/L Final   10/21/2021 28 22 - 31 mmol/L Final     Anion Gap   Date Value Ref Range Status   02/23/2023 8 7 - 15 mmol/L Final   10/21/2021 11 5 - 18 " mmol/L Final     Glucose   Date Value Ref Range Status   02/23/2023 89 70 - 99 mg/dL Final   10/21/2021 158 (H) 70 - 125 mg/dL Final     Urea Nitrogen   Date Value Ref Range Status   02/23/2023 15.0 8.0 - 23.0 mg/dL Final   10/21/2021 20 8 - 28 mg/dL Final     Creatinine   Date Value Ref Range Status   02/23/2023 0.68 0.51 - 0.95 mg/dL Final     GFR Estimate   Date Value Ref Range Status   02/23/2023 90 >60 mL/min/1.73m2 Final     Comment:     eGFR calculated using 2021 CKD-EPI equation.   10/20/2020 >60 >60 mL/min/1.73m2 Final     Calcium   Date Value Ref Range Status   02/23/2023 8.6 (L) 8.8 - 10.2 mg/dL Final     This note has been dictated using voice recognition software. Any grammatical or context distortions are unintentional and inherent to the software    Electronically signed by: Penelope Hendrix, CNP

## 2023-03-02 ENCOUNTER — TRANSITIONAL CARE UNIT VISIT (OUTPATIENT)
Dept: GERIATRICS | Facility: CLINIC | Age: 78
End: 2023-03-02
Payer: COMMERCIAL

## 2023-03-02 VITALS
SYSTOLIC BLOOD PRESSURE: 133 MMHG | WEIGHT: 111.2 LBS | BODY MASS INDEX: 20.46 KG/M2 | DIASTOLIC BLOOD PRESSURE: 63 MMHG | RESPIRATION RATE: 18 BRPM | HEART RATE: 67 BPM | TEMPERATURE: 98.1 F | HEIGHT: 62 IN | OXYGEN SATURATION: 100 %

## 2023-03-02 DIAGNOSIS — D62 ABLA (ACUTE BLOOD LOSS ANEMIA): ICD-10-CM

## 2023-03-02 DIAGNOSIS — M79.662 PAIN AND SWELLING OF LEFT LOWER LEG: ICD-10-CM

## 2023-03-02 DIAGNOSIS — M79.89 PAIN AND SWELLING OF LEFT LOWER LEG: ICD-10-CM

## 2023-03-02 DIAGNOSIS — I10 PRIMARY HYPERTENSION: ICD-10-CM

## 2023-03-02 DIAGNOSIS — Z87.81 S/P ORIF (OPEN REDUCTION INTERNAL FIXATION) FRACTURE: ICD-10-CM

## 2023-03-02 DIAGNOSIS — S72.002S CLOSED LEFT HIP FRACTURE, SEQUELA: Primary | ICD-10-CM

## 2023-03-02 DIAGNOSIS — Z98.890 S/P ORIF (OPEN REDUCTION INTERNAL FIXATION) FRACTURE: ICD-10-CM

## 2023-03-02 PROCEDURE — 99309 SBSQ NF CARE MODERATE MDM 30: CPT | Performed by: NURSE PRACTITIONER

## 2023-03-02 NOTE — LETTER
3/2/2023        RE: Sharon Weinberg  [address]  [facility] [address]         HEALTH GERIATRIC SERVICES    Code Status:  FULL CODE   Visit Type:   Chief Complaint   Patient presents with     TCU Follow Up     Facility:  [facility]) [[id number]]         HPI: Sharon Weinberg is a 77 year old female who I am seeing today for follow-up on the TCU. Past medical history included depression, RLS, anxiety and HTN. Pt recently hospitalized on 2/14/23 2/t to fall with left hip pain. Pt hit her head but no LOC. Pt found to have a displaced and angulated comminuted fx of the left femoral neck fracture. Pt underwent ORIF on 2/15/23. Pt treated for pain with oxycodone. ASA for DVT prophylaxis. Pt had hypotension post operatively due to acute blood loss anemia. Hgb dropped from 13.1 to 9.3. She did not require transfusion however did receive fluids for resuscitation.     Transitional Care Course: Today pt sitting up in wheelchair. Recent left hip fracture with ORIF. Patient with increased pain and swelling in her left lower extremity.  She does complain of some pain with dorsi and plantarflexion.  She continues on aspirin 81 mg twice daily for DVT prophylactics.  No redness or warmth.  Pain control with oxycodone and Tylenol.  Significant bruising around the distal incision site and knee.  Recent hemoglobin 9.4.    Assessment/Plan:     Closed left hip fracture, sequela  S/P ORIF (open reduction internal fixation) fracture  -Pt WBAT.   -Pain control with oxycodone and Tylenol.  -ASA 81 mg BID for DVT prophy.   -Surgical bandage to stay in place until follow up with Orthopedics.   -Bruising to the distal aspect of incision.  Ice every 2 hours for 20 minutes outside of therapy.  -Follow-up with phone visit with Ortho in the a.m.  Nursing to remove sutures or staples.  Then repeat follow-up on 3/30/2023 with Ortho.    Swelling and pain of left lower leg  -Venous Doppler to left lower extremity rule  out DVT  -Tubigrip on in the a.m. off at at bedtime  -Elevate  -Early ambulation.    ABLA (acute blood loss anemia)  -Hgb 9.4  -Follow-up hemoglobin 10.1      Primary hypertension  -atenolol 25 mg daily.   -BP satisfactory      Active Ambulatory Problems     Diagnosis Date Noted     Benign neoplasm of ascending colon 01/06/2022     History of colonic polyps 12/07/2016     Cystocele, midline 05/07/2007     Polyp of colon 12/05/2016     Rectocele 05/07/2007     Uterovaginal prolapse, unspecified 05/07/2007     Acute blood loss anemia 02/20/2023     Anxiety 02/21/2023     Closed left hip fracture (H) 02/15/2023     Depression 02/21/2023     HTN (hypertension) 02/21/2023     Hypotension 02/20/2023     RLS (restless legs syndrome) 02/21/2023     Resolved Ambulatory Problems     Diagnosis Date Noted     No Resolved Ambulatory Problems     No Additional Past Medical History     Allergies   Allergen Reactions     Penicillins Palpitations, Anaphylaxis, Hives and Difficulty breathing     Cephalexin Hives     Doxycycline Itching and Rash     Sulfa Drugs Hives     Sulfatolamide Hives       All Meds and Allergies reviewed in the record at the facility and is the most up-to-date.    Current Outpatient Medications   Medication Sig     acetaminophen (TYLENOL) 325 MG tablet Take 650 mg by mouth every 6 hours as needed for mild pain     albuterol (PROAIR HFA/PROVENTIL HFA/VENTOLIN HFA) 108 (90 Base) MCG/ACT inhaler Inhale 1-2 puffs into the lungs every 4 hours as needed     aspirin (ASA) 81 MG chewable tablet Take 81 mg by mouth 2 times daily (with meals)     atenolol (TENORMIN) 25 MG tablet Take 25 mg by mouth daily     cycloSPORINE (RESTASIS) 0.05 % ophthalmic emulsion Place 1 drop into both eyes every 12 hours     LORazepam (ATIVAN) 0.5 MG tablet Take 0.5 mg by mouth 3 times daily as needed for anxiety     mirtazapine (REMERON) 30 MG tablet Take 30 mg by mouth At Bedtime     Multiple Vitamins-Minerals (MULTI VITAMIN  /MINERALS)  "TABS Take 1 tablet by mouth daily     oxyCODONE (ROXICODONE) 5 MG tablet Take 1 tablet (5 mg) by mouth 2 times daily. May also take 0.5-1 tablets (2.5-5 mg) every 6 hours as needed for severe pain (7-10).     pramipexole (MIRAPEX) 0.25 MG tablet Take 0.25 mg by mouth At Bedtime     senna-docusate (SENOKOT-S/PERICOLACE) 8.6-50 MG tablet Take 2 tablets by mouth 2 times daily     UNABLE TO FIND Take 1 tablet by mouth 2 times daily MEDICATION NAME: Algaecal tablet; 750 mg calcium, 65 mg magnesium, 25 mcg vitamin D3     No current facility-administered medications for this visit.       REVIEW OF SYSTEMS:   10 point review of systems reviewed and pertinent positives in the HPI.     PHYSICAL EXAMINATION:  Physical Exam     Vital signs: /63   Pulse 67   Temp 98.1  F (36.7  C)   Resp 18   Ht 1.575 m (5' 2\")   Wt 50.4 kg (111 lb 3.2 oz)   SpO2 100%   BMI 20.34 kg/m    General: Awake, Alert, oriented x3,sitting up in wheelchair,  conversant  HEENT:Pink conjunctiva, moist oral mucosa  NECK: Supple  CVS:  S1  S2, without murmur or gallop.   LUNG: Clear to auscultation, No wheezes, rales or rhonci.  BACK: No kyphosis of the thoracic spine  ABDOMEN: Soft,  with positive bowel sounds  EXTREMITIES: Moves both upper and lower extremities with some limitation to LLE, 2+ pedal edema on the operative side, no calf tenderness.  Slight tenderness with dorsi and plantarflexion.  SKIN: Incision to left hip covered with surgical bandage.  Bruising to the distal aspect of the incision around the knee.  NEUROLOGIC: Intact, pulses palpable  PSYCHIATRIC: Cognition intact      Labs:  All labs reviewed in the nursing home record and [facility]     @Last Comprehensive Metabolic Panel:  Sodium   Date Value Ref Range Status   02/23/2023 140 136 - 145 mmol/L Final     Potassium   Date Value Ref Range Status   02/23/2023 4.1 3.4 - 5.3 mmol/L Final   10/21/2021 4.4 3.5 - 5.0 mmol/L Final     Chloride   Date Value Ref Range Status   02/23/2023 108 " (H) 98 - 107 mmol/L Final   10/21/2021 101 98 - 107 mmol/L Final     Carbon Dioxide (CO2)   Date Value Ref Range Status   02/23/2023 24 22 - 29 mmol/L Final   10/21/2021 28 22 - 31 mmol/L Final     Anion Gap   Date Value Ref Range Status   02/23/2023 8 7 - 15 mmol/L Final   10/21/2021 11 5 - 18 mmol/L Final     Glucose   Date Value Ref Range Status   02/23/2023 89 70 - 99 mg/dL Final   10/21/2021 158 (H) 70 - 125 mg/dL Final     Urea Nitrogen   Date Value Ref Range Status   02/23/2023 15.0 8.0 - 23.0 mg/dL Final   10/21/2021 20 8 - 28 mg/dL Final     Creatinine   Date Value Ref Range Status   02/23/2023 0.68 0.51 - 0.95 mg/dL Final     GFR Estimate   Date Value Ref Range Status   02/23/2023 90 >60 mL/min/1.73m2 Final     Comment:     eGFR calculated using 2021 CKD-EPI equation.   10/20/2020 >60 >60 mL/min/1.73m2 Final     Calcium   Date Value Ref Range Status   02/23/2023 8.6 (L) 8.8 - 10.2 mg/dL Final     This note has been dictated using voice recognition software. Any grammatical or context distortions are unintentional and inherent to the software    Electronically signed by: Penelope Hendrix, [employer]         Sincerely,        Penelope Hendrix, NP

## 2023-03-03 NOTE — PROGRESS NOTES
[facility] GERIATRIC SERVICES    Code Status:  FULL CODE   Visit Type:   Chief Complaint   Patient presents with     TCU Follow Up     Facility:  [facility] ([facility]) [[id number]]         HPI: Sharon Weinberg is a 77 year old female who I am seeing today for follow-up on the TCU. Past medical history included depression, RLS, anxiety and HTN. Pt recently hospitalized on 2/14/23 2/t to fall with left hip pain. Pt hit her head but no LOC. Pt found to have a displaced and angulated comminuted fx of the left femoral neck fracture. Pt underwent ORIF on 2/15/23. Pt treated for pain with oxycodone. ASA for DVT prophylaxis. Pt had hypotension post operatively due to acute blood loss anemia. Hgb dropped from 13.1 to 9.3. She did not require transfusion however did receive fluids for resuscitation.     Transitional Care Course: Today pt sitting up in wheelchair. Recent left hip fracture with ORIF. Patient with increased pain and swelling in her left lower extremity.  She does complain of some pain with dorsi and plantarflexion.  She continues on aspirin 81 mg twice daily for DVT prophylactics.  No redness or warmth.  Pain control with oxycodone and Tylenol.  Significant bruising around the distal incision site and knee.  Recent hemoglobin 9.4.    Assessment/Plan:     Closed left hip fracture, sequela  S/P ORIF (open reduction internal fixation) fracture  -Pt WBAT.   -Pain control with oxycodone and Tylenol.  -ASA 81 mg BID for DVT prophy.   -Surgical bandage to stay in place until follow up with Orthopedics.   -Bruising to the distal aspect of incision.  Ice every 2 hours for 20 minutes outside of therapy.  -Follow-up with phone visit with Ortho in the a.m.  Nursing to remove sutures or staples.  Then repeat follow-up on 3/30/2023 with Ortho.    Swelling and pain of left lower leg  -Venous Doppler to left lower extremity rule out DVT  -Tubigrip on in the a.m. off at at bedtime  -Elevate  -Early ambulation.    ABLA  (acute blood loss anemia)  -Hgb 9.4  -Follow-up hemoglobin 10.1      Primary hypertension  -atenolol 25 mg daily.   -BP satisfactory      Active Ambulatory Problems     Diagnosis Date Noted     Benign neoplasm of ascending colon 01/06/2022     History of colonic polyps 12/07/2016     Cystocele, midline 05/07/2007     Polyp of colon 12/05/2016     Rectocele 05/07/2007     Uterovaginal prolapse, unspecified 05/07/2007     Acute blood loss anemia 02/20/2023     Anxiety 02/21/2023     Closed left hip fracture (H) 02/15/2023     Depression 02/21/2023     HTN (hypertension) 02/21/2023     Hypotension 02/20/2023     RLS (restless legs syndrome) 02/21/2023     Resolved Ambulatory Problems     Diagnosis Date Noted     No Resolved Ambulatory Problems     No Additional Past Medical History     Allergies   Allergen Reactions     Penicillins Palpitations, Anaphylaxis, Hives and Difficulty breathing     Cephalexin Hives     Doxycycline Itching and Rash     Sulfa Drugs Hives     Sulfatolamide Hives       All Meds and Allergies reviewed in the record at the facility and is the most up-to-date.    Current Outpatient Medications   Medication Sig     acetaminophen (TYLENOL) 325 MG tablet Take 650 mg by mouth every 6 hours as needed for mild pain     albuterol (PROAIR HFA/PROVENTIL HFA/VENTOLIN HFA) 108 (90 Base) MCG/ACT inhaler Inhale 1-2 puffs into the lungs every 4 hours as needed     aspirin (ASA) 81 MG chewable tablet Take 81 mg by mouth 2 times daily (with meals)     atenolol (TENORMIN) 25 MG tablet Take 25 mg by mouth daily     cycloSPORINE (RESTASIS) 0.05 % ophthalmic emulsion Place 1 drop into both eyes every 12 hours     LORazepam (ATIVAN) 0.5 MG tablet Take 0.5 mg by mouth 3 times daily as needed for anxiety     mirtazapine (REMERON) 30 MG tablet Take 30 mg by mouth At Bedtime     Multiple Vitamins-Minerals (MULTI VITAMIN  /MINERALS) TABS Take 1 tablet by mouth daily     oxyCODONE (ROXICODONE) 5 MG tablet Take 1 tablet (5  "mg) by mouth 2 times daily. May also take 0.5-1 tablets (2.5-5 mg) every 6 hours as needed for severe pain (7-10).     pramipexole (MIRAPEX) 0.25 MG tablet Take 0.25 mg by mouth At Bedtime     senna-docusate (SENOKOT-S/PERICOLACE) 8.6-50 MG tablet Take 2 tablets by mouth 2 times daily     UNABLE TO FIND Take 1 tablet by mouth 2 times daily MEDICATION NAME: Algaecal tablet; 750 mg calcium, 65 mg magnesium, 25 mcg vitamin D3     No current facility-administered medications for this visit.       REVIEW OF SYSTEMS:   10 point review of systems reviewed and pertinent positives in the HPI.     PHYSICAL EXAMINATION:  Physical Exam     Vital signs: /63   Pulse 67   Temp 98.1  F (36.7  C)   Resp 18   Ht 1.575 m (5' 2\")   Wt 50.4 kg (111 lb 3.2 oz)   SpO2 100%   BMI 20.34 kg/m    General: Awake, Alert, oriented x3,sitting up in wheelchair,  conversant  HEENT:Pink conjunctiva, moist oral mucosa  NECK: Supple  CVS:  S1  S2, without murmur or gallop.   LUNG: Clear to auscultation, No wheezes, rales or rhonci.  BACK: No kyphosis of the thoracic spine  ABDOMEN: Soft,  with positive bowel sounds  EXTREMITIES: Moves both upper and lower extremities with some limitation to LLE, 2+ pedal edema on the operative side, no calf tenderness.  Slight tenderness with dorsi and plantarflexion.  SKIN: Incision to left hip covered with surgical bandage.  Bruising to the distal aspect of the incision around the knee.  NEUROLOGIC: Intact, pulses palpable  PSYCHIATRIC: Cognition intact      Labs:  All labs reviewed in the nursing home record and [facility]     @[facility] Comprehensive Metabolic Panel:  Sodium   Date Value Ref Range Status   02/23/2023 140 136 - 145 mmol/L Final     Potassium   Date Value Ref Range Status   02/23/2023 4.1 3.4 - 5.3 mmol/L Final   10/21/2021 4.4 3.5 - 5.0 mmol/L Final     Chloride   Date Value Ref Range Status   02/23/2023 108 (H) 98 - 107 mmol/L Final   10/21/2021 101 98 - 107 mmol/L Final     Carbon Dioxide (CO2) "   Date Value Ref Range Status   02/23/2023 24 22 - 29 mmol/L Final   10/21/2021 28 22 - 31 mmol/L Final     Anion Gap   Date Value Ref Range Status   02/23/2023 8 7 - 15 mmol/L Final   10/21/2021 11 5 - 18 mmol/L Final     Glucose   Date Value Ref Range Status   02/23/2023 89 70 - 99 mg/dL Final   10/21/2021 158 (H) 70 - 125 mg/dL Final     Urea Nitrogen   Date Value Ref Range Status   02/23/2023 15.0 8.0 - 23.0 mg/dL Final   10/21/2021 20 8 - 28 mg/dL Final     Creatinine   Date Value Ref Range Status   02/23/2023 0.68 0.51 - 0.95 mg/dL Final     GFR Estimate   Date Value Ref Range Status   02/23/2023 90 >60 mL/min/1.73m2 Final     Comment:     eGFR calculated using 2021 CKD-EPI equation.   10/20/2020 >60 >60 mL/min/1.73m2 Final     Calcium   Date Value Ref Range Status   02/23/2023 8.6 (L) 8.8 - 10.2 mg/dL Final     This note has been dictated using voice recognition software. Any grammatical or context distortions are unintentional and inherent to the software    Electronically signed by: Penelope Hendrix CNP

## 2023-03-06 ENCOUNTER — TRANSITIONAL CARE UNIT VISIT (OUTPATIENT)
Dept: GERIATRICS | Facility: CLINIC | Age: 78
End: 2023-03-06
Payer: COMMERCIAL

## 2023-03-06 VITALS
WEIGHT: 111.2 LBS | TEMPERATURE: 98 F | SYSTOLIC BLOOD PRESSURE: 131 MMHG | RESPIRATION RATE: 16 BRPM | HEIGHT: 62 IN | OXYGEN SATURATION: 97 % | BODY MASS INDEX: 20.46 KG/M2 | HEART RATE: 89 BPM | DIASTOLIC BLOOD PRESSURE: 70 MMHG

## 2023-03-06 DIAGNOSIS — Z87.81 S/P ORIF (OPEN REDUCTION INTERNAL FIXATION) FRACTURE: ICD-10-CM

## 2023-03-06 DIAGNOSIS — Z98.890 S/P ORIF (OPEN REDUCTION INTERNAL FIXATION) FRACTURE: ICD-10-CM

## 2023-03-06 DIAGNOSIS — S72.002S CLOSED LEFT HIP FRACTURE, SEQUELA: Primary | ICD-10-CM

## 2023-03-06 DIAGNOSIS — I10 PRIMARY HYPERTENSION: ICD-10-CM

## 2023-03-06 DIAGNOSIS — R60.0 LEG EDEMA, LEFT: ICD-10-CM

## 2023-03-06 DIAGNOSIS — D62 ABLA (ACUTE BLOOD LOSS ANEMIA): ICD-10-CM

## 2023-03-06 PROCEDURE — 99309 SBSQ NF CARE MODERATE MDM 30: CPT | Performed by: NURSE PRACTITIONER

## 2023-03-06 NOTE — LETTER
3/6/2023        RE: Sharon Weinberg  [address]  [facility] [address]           HEALTH GERIATRIC SERVICES    Code Status:  FULL CODE   Visit Type:   Chief Complaint   Patient presents with     TCU Follow Up     Facility:  [facility] ([facility]) [[id number]]         HPI: Sharon Weinberg is a 77 year old female who I am seeing today for follow-up on the TCU. Past medical history included depression, RLS, anxiety and HTN. Pt recently hospitalized on 2/14/23 2/t to fall with left hip pain. Pt hit her head but no LOC. Pt found to have a displaced and angulated comminuted fx of the left femoral neck fracture. Pt underwent ORIF on 2/15/23. Pt treated for pain with oxycodone. ASA for DVT prophylaxis. Pt had hypotension post operatively due to acute blood loss anemia. Hgb dropped from 13.1 to 9.3. She did not require transfusion however did receive fluids for resuscitation.     Transitional Care Course: Today pt sitting up in bed.  Recent left hip fracture with ORIF.  Patient to follow-up with Ortho via the phone last week.  Orders were given to remove the staples from her hip.  Nursing staff were moved to staples from the superior incision however staples remain in the distal incision.  We will DC these today.  Patient with increased swelling of the left lower extremity.  Venous Doppler was obtained which was negative for DVT.  Tubigrip was ordered.  She is not in this today.  Patient continues on aspirin 81 mg twice daily for DVT prophylactics.  Pain control with Tylenol and oxycodone.  Acute blood loss anemia.  Hemoglobin stable at 9.4.      Assessment/Plan:     Closed left hip fracture, sequela  S/P ORIF (open reduction internal fixation) fracture  -Pt WBAT.   -Pain control with oxycodone and Tylenol.  -ASA 81 mg BID for DVT prophy.   -DC remaining staples to the distal incision.  Cover with Steri-Strips.  Leave open to air.    Left leg edema  -Venous Doppler obtained which was negative for  DVT.  -Orders written last week for Tubigrip on in the a.m. off at at bedtime.  Reinforced today.  -Elevation between therapies.  -Ice between therapies.    ABLA (acute blood loss anemia)  -Hgb 9.4  -Follow-up hemoglobin 10.1      Primary hypertension  -atenolol 25 mg daily.   -BP satisfactory      Active Ambulatory Problems     Diagnosis Date Noted     Benign neoplasm of ascending colon 01/06/2022     History of colonic polyps 12/07/2016     Cystocele, midline 05/07/2007     Polyp of colon 12/05/2016     Rectocele 05/07/2007     Uterovaginal prolapse, unspecified 05/07/2007     Acute blood loss anemia 02/20/2023     Anxiety 02/21/2023     Closed left hip fracture (H) 02/15/2023     Depression 02/21/2023     HTN (hypertension) 02/21/2023     Hypotension 02/20/2023     RLS (restless legs syndrome) 02/21/2023     Resolved Ambulatory Problems     Diagnosis Date Noted     No Resolved Ambulatory Problems     No Additional Past Medical History     Allergies   Allergen Reactions     Penicillins Palpitations, Anaphylaxis, Hives and Difficulty breathing     Cephalexin Hives     Doxycycline Itching and Rash     Sulfa Drugs Hives     Sulfatolamide Hives       All Meds and Allergies reviewed in the record at the facility and is the most up-to-date.    Current Outpatient Medications   Medication Sig     acetaminophen (TYLENOL) 325 MG tablet Take 650 mg by mouth every 6 hours as needed for mild pain     albuterol (PROAIR HFA/PROVENTIL HFA/VENTOLIN HFA) 108 (90 Base) MCG/ACT inhaler Inhale 1-2 puffs into the lungs every 4 hours as needed     aspirin (ASA) 81 MG chewable tablet Take 81 mg by mouth 2 times daily (with meals)     atenolol (TENORMIN) 25 MG tablet Take 25 mg by mouth daily     cycloSPORINE (RESTASIS) 0.05 % ophthalmic emulsion Place 1 drop into both eyes every 12 hours     LORazepam (ATIVAN) 0.5 MG tablet Take 0.5 mg by mouth 3 times daily as needed for anxiety     mirtazapine (REMERON) 30 MG tablet Take 30 mg by mouth  "At Bedtime     Multiple Vitamins-Minerals (MULTI VITAMIN  /MINERALS) TABS Take 1 tablet by mouth daily     oxyCODONE (ROXICODONE) 5 MG tablet Take 1 tablet (5 mg) by mouth 2 times daily. May also take 0.5-1 tablets (2.5-5 mg) every 6 hours as needed for severe pain (7-10).     pramipexole (MIRAPEX) 0.25 MG tablet Take 0.25 mg by mouth At Bedtime     senna-docusate (SENOKOT-S/PERICOLACE) 8.6-50 MG tablet Take 2 tablets by mouth 2 times daily     UNABLE TO FIND Take 1 tablet by mouth 2 times daily MEDICATION NAME: Algaecal tablet; 750 mg calcium, 65 mg magnesium, 25 mcg vitamin D3     No current facility-administered medications for this visit.       REVIEW OF SYSTEMS:   10 point review of systems reviewed and pertinent positives in the HPI.     PHYSICAL EXAMINATION:  Physical Exam     Vital signs: /70   Pulse 89   Temp 98  F (36.7  C)   Resp 16   Ht 1.575 m (5' 2\")   Wt 50.4 kg (111 lb 3.2 oz)   SpO2 97%   BMI 20.34 kg/m    General: Awake, Alert, oriented x3,sitting up in bed,  conversant  HEENT:Pink conjunctiva, moist oral mucosa  NECK: Supple  CVS:  S1  S2, without murmur or gallop.   LUNG: Clear to auscultation, No wheezes, rales or rhonci.  BACK: No kyphosis of the thoracic spine  EXTREMITIES: Moves both upper and lower extremities with some limitation to LLE, 2+ pedal edema on the operative side, no calf tenderness.    SKIN: Superior aspect of incision open to air.  Staples in the distal incision.  Bruising to the distal aspect of the incision around the knee.  NEUROLOGIC: Intact, pulses palpable  PSYCHIATRIC: Cognition intact      Labs:  All labs reviewed in the nursing home record and Epic     @Last Comprehensive Metabolic Panel:  Sodium   Date Value Ref Range Status   02/23/2023 140 136 - 145 mmol/L Final     Potassium   Date Value Ref Range Status   02/23/2023 4.1 3.4 - 5.3 mmol/L Final   10/21/2021 4.4 3.5 - 5.0 mmol/L Final     Chloride   Date Value Ref Range Status   02/23/2023 108 (H) 98 - " 107 mmol/L Final   10/21/2021 101 98 - 107 mmol/L Final     Carbon Dioxide (CO2)   Date Value Ref Range Status   02/23/2023 24 22 - 29 mmol/L Final   10/21/2021 28 22 - 31 mmol/L Final     Anion Gap   Date Value Ref Range Status   02/23/2023 8 7 - 15 mmol/L Final   10/21/2021 11 5 - 18 mmol/L Final     Glucose   Date Value Ref Range Status   02/23/2023 89 70 - 99 mg/dL Final   10/21/2021 158 (H) 70 - 125 mg/dL Final     Urea Nitrogen   Date Value Ref Range Status   02/23/2023 15.0 8.0 - 23.0 mg/dL Final   10/21/2021 20 8 - 28 mg/dL Final     Creatinine   Date Value Ref Range Status   02/23/2023 0.68 0.51 - 0.95 mg/dL Final     GFR Estimate   Date Value Ref Range Status   02/23/2023 90 >60 mL/min/1.73m2 Final     Comment:     eGFR calculated using 2021 CKD-EPI equation.   10/20/2020 >60 >60 mL/min/1.73m2 Final     Calcium   Date Value Ref Range Status   02/23/2023 8.6 (L) 8.8 - 10.2 mg/dL Final     This note has been dictated using voice recognition software. Any grammatical or context distortions are unintentional and inherent to the software    Electronically signed by: Penelope Hendrix, TRINITY         Sincerely,        Penelope Hendrix, NP

## 2023-03-07 NOTE — PROGRESS NOTES
[facility] GERIATRIC SERVICES    Code Status:  FULL CODE   Visit Type:   Chief Complaint   Patient presents with     TCU Follow Up     Facility:  [facility] ([facility]) [[id number]]         HPI: Sharon Weinberg is a 77 year old female who I am seeing today for follow-up on the TCU. Past medical history included depression, RLS, anxiety and HTN. Pt recently hospitalized on 2/14/23 2/t to fall with left hip pain. Pt hit her head but no LOC. Pt found to have a displaced and angulated comminuted fx of the left femoral neck fracture. Pt underwent ORIF on 2/15/23. Pt treated for pain with oxycodone. ASA for DVT prophylaxis. Pt had hypotension post operatively due to acute blood loss anemia. Hgb dropped from 13.1 to 9.3. She did not require transfusion however did receive fluids for resuscitation.     Transitional Care Course: Today pt sitting up in bed.  Recent left hip fracture with ORIF.  Patient to follow-up with Ortho via the phone last week.  Orders were given to remove the staples from her hip.  Nursing staff were moved to staples from the superior incision however staples remain in the distal incision.  We will DC these today.  Patient with increased swelling of the left lower extremity.  Venous Doppler was obtained which was negative for DVT.  Tubigrip was ordered.  She is not in this today.  Patient continues on aspirin 81 mg twice daily for DVT prophylactics.  Pain control with Tylenol and oxycodone.  Acute blood loss anemia.  Hemoglobin stable at 9.4.      Assessment/Plan:     Closed left hip fracture, sequela  S/P ORIF (open reduction internal fixation) fracture  -Pt WBAT.   -Pain control with oxycodone and Tylenol.  -ASA 81 mg BID for DVT prophy.   -DC remaining staples to the distal incision.  Cover with Steri-Strips.  Leave open to air.    Left leg edema  -Venous Doppler obtained which was negative for DVT.  -Orders written last week for Tubigrip on in the a.m. off at at bedtime.  Reinforced  today.  -Elevation between therapies.  -Ice between therapies.    ABLA (acute blood loss anemia)  -Hgb 9.4  -Follow-up hemoglobin 10.1      Primary hypertension  -atenolol 25 mg daily.   -BP satisfactory      Active Ambulatory Problems     Diagnosis Date Noted     Benign neoplasm of ascending colon 01/06/2022     History of colonic polyps 12/07/2016     Cystocele, midline 05/07/2007     Polyp of colon 12/05/2016     Rectocele 05/07/2007     Uterovaginal prolapse, unspecified 05/07/2007     Acute blood loss anemia 02/20/2023     Anxiety 02/21/2023     Closed left hip fracture (H) 02/15/2023     Depression 02/21/2023     HTN (hypertension) 02/21/2023     Hypotension 02/20/2023     RLS (restless legs syndrome) 02/21/2023     Resolved Ambulatory Problems     Diagnosis Date Noted     No Resolved Ambulatory Problems     No Additional Past Medical History     Allergies   Allergen Reactions     Penicillins Palpitations, Anaphylaxis, Hives and Difficulty breathing     Cephalexin Hives     Doxycycline Itching and Rash     Sulfa Drugs Hives     Sulfatolamide Hives       All Meds and Allergies reviewed in the record at the facility and is the most up-to-date.    Current Outpatient Medications   Medication Sig     acetaminophen (TYLENOL) 325 MG tablet Take 650 mg by mouth every 6 hours as needed for mild pain     albuterol (PROAIR HFA/PROVENTIL HFA/VENTOLIN HFA) 108 (90 Base) MCG/ACT inhaler Inhale 1-2 puffs into the lungs every 4 hours as needed     aspirin (ASA) 81 MG chewable tablet Take 81 mg by mouth 2 times daily (with meals)     atenolol (TENORMIN) 25 MG tablet Take 25 mg by mouth daily     cycloSPORINE (RESTASIS) 0.05 % ophthalmic emulsion Place 1 drop into both eyes every 12 hours     LORazepam (ATIVAN) 0.5 MG tablet Take 0.5 mg by mouth 3 times daily as needed for anxiety     mirtazapine (REMERON) 30 MG tablet Take 30 mg by mouth At Bedtime     Multiple Vitamins-Minerals (MULTI VITAMIN  /MINERALS) TABS Take 1 tablet by  "mouth daily     oxyCODONE (ROXICODONE) 5 MG tablet Take 1 tablet (5 mg) by mouth 2 times daily. May also take 0.5-1 tablets (2.5-5 mg) every 6 hours as needed for severe pain (7-10).     pramipexole (MIRAPEX) 0.25 MG tablet Take 0.25 mg by mouth At Bedtime     senna-docusate (SENOKOT-S/PERICOLACE) 8.6-50 MG tablet Take 2 tablets by mouth 2 times daily     UNABLE TO FIND Take 1 tablet by mouth 2 times daily MEDICATION NAME: Algaecal tablet; 750 mg calcium, 65 mg magnesium, 25 mcg vitamin D3     No current facility-administered medications for this visit.       REVIEW OF SYSTEMS:   10 point review of systems reviewed and pertinent positives in the HPI.     PHYSICAL EXAMINATION:  Physical Exam     Vital signs: /70   Pulse 89   Temp 98  F (36.7  C)   Resp 16   Ht 1.575 m (5' 2\")   Wt 50.4 kg (111 lb 3.2 oz)   SpO2 97%   BMI 20.34 kg/m    General: Awake, Alert, oriented x3,sitting up in bed,  conversant  HEENT:Pink conjunctiva, moist oral mucosa  NECK: Supple  CVS:  S1  S2, without murmur or gallop.   LUNG: Clear to auscultation, No wheezes, rales or rhonci.  BACK: No kyphosis of the thoracic spine  EXTREMITIES: Moves both upper and lower extremities with some limitation to LLE, 2+ pedal edema on the operative side, no calf tenderness.    SKIN: Superior aspect of incision open to air.  Staples in the distal incision.  Bruising to the distal aspect of the incision around the knee.  NEUROLOGIC: Intact, pulses palpable  PSYCHIATRIC: Cognition intact      Labs:  All labs reviewed in the nursing home record and [facility]     @Last Comprehensive Metabolic Panel:  Sodium   Date Value Ref Range Status   02/23/2023 140 136 - 145 mmol/L Final     Potassium   Date Value Ref Range Status   02/23/2023 4.1 3.4 - 5.3 mmol/L Final   10/21/2021 4.4 3.5 - 5.0 mmol/L Final     Chloride   Date Value Ref Range Status   02/23/2023 108 (H) 98 - 107 mmol/L Final   10/21/2021 101 98 - 107 mmol/L Final     Carbon Dioxide (CO2)   Date " Value Ref Range Status   02/23/2023 24 22 - 29 mmol/L Final   10/21/2021 28 22 - 31 mmol/L Final     Anion Gap   Date Value Ref Range Status   02/23/2023 8 7 - 15 mmol/L Final   10/21/2021 11 5 - 18 mmol/L Final     Glucose   Date Value Ref Range Status   02/23/2023 89 70 - 99 mg/dL Final   10/21/2021 158 (H) 70 - 125 mg/dL Final     Urea Nitrogen   Date Value Ref Range Status   02/23/2023 15.0 8.0 - 23.0 mg/dL Final   10/21/2021 20 8 - 28 mg/dL Final     Creatinine   Date Value Ref Range Status   02/23/2023 0.68 0.51 - 0.95 mg/dL Final     GFR Estimate   Date Value Ref Range Status   02/23/2023 90 >60 mL/min/1.73m2 Final     Comment:     eGFR calculated using 2021 CKD-EPI equation.   10/20/2020 >60 >60 mL/min/1.73m2 Final     Calcium   Date Value Ref Range Status   02/23/2023 8.6 (L) 8.8 - 10.2 mg/dL Final     This note has been dictated using voice recognition software. Any grammatical or context distortions are unintentional and inherent to the software    Electronically signed by: Penelope Hendrix, CNP

## 2023-03-09 ENCOUNTER — TRANSITIONAL CARE UNIT VISIT (OUTPATIENT)
Dept: GERIATRICS | Facility: CLINIC | Age: 78
End: 2023-03-09
Payer: COMMERCIAL

## 2023-03-09 VITALS
RESPIRATION RATE: 16 BRPM | SYSTOLIC BLOOD PRESSURE: 159 MMHG | TEMPERATURE: 97.8 F | HEIGHT: 62 IN | HEART RATE: 78 BPM | DIASTOLIC BLOOD PRESSURE: 69 MMHG | WEIGHT: 109.6 LBS | OXYGEN SATURATION: 95 % | BODY MASS INDEX: 20.17 KG/M2

## 2023-03-09 DIAGNOSIS — Z87.81 S/P ORIF (OPEN REDUCTION INTERNAL FIXATION) FRACTURE: ICD-10-CM

## 2023-03-09 DIAGNOSIS — G25.81 RESTLESS LEGS SYNDROME (RLS): ICD-10-CM

## 2023-03-09 DIAGNOSIS — M79.662 PAIN AND SWELLING OF LEFT LOWER LEG: ICD-10-CM

## 2023-03-09 DIAGNOSIS — D62 ABLA (ACUTE BLOOD LOSS ANEMIA): ICD-10-CM

## 2023-03-09 DIAGNOSIS — Z98.890 S/P ORIF (OPEN REDUCTION INTERNAL FIXATION) FRACTURE: ICD-10-CM

## 2023-03-09 DIAGNOSIS — M79.89 PAIN AND SWELLING OF LEFT LOWER LEG: ICD-10-CM

## 2023-03-09 DIAGNOSIS — S72.002S CLOSED LEFT HIP FRACTURE, SEQUELA: Primary | ICD-10-CM

## 2023-03-09 DIAGNOSIS — I10 PRIMARY HYPERTENSION: ICD-10-CM

## 2023-03-09 PROCEDURE — 99310 SBSQ NF CARE HIGH MDM 45: CPT | Performed by: NURSE PRACTITIONER

## 2023-03-09 NOTE — LETTER
3/9/2023        RE: Sharon Weinberg  [address]  [facility] [address]           HEALTH GERIATRIC SERVICES    Code Status:  FULL CODE   Visit Type:   Chief Complaint   Patient presents with     TCU Follow Up     Facility:  [facility]) [[id number]]         HPI: Sharon Weinberg is a 77 year old female who I am seeing today for follow-up on the TCU. Past medical history included depression, RLS, anxiety and HTN. Pt recently hospitalized on 2/14/23 2/t to fall with left hip pain. Pt hit her head but no LOC. Pt found to have a displaced and angulated comminuted fx of the left femoral neck fracture. Pt underwent ORIF on 2/15/23. Pt treated for pain with oxycodone. ASA for DVT prophylaxis. Pt had hypotension post operatively due to acute blood loss anemia. Hgb dropped from 13.1 to 9.3. She did not require transfusion however did receive fluids for resuscitation.     Transitional Care Course: Today pt sitting up in bed. Recent left hip fracture with ORIF. Today nursing staff reporting increased swelling to LLE. She now has bruising/bluish discolorization between 2nd and 3rd digit and bottom of foot. She denies any acute injury or trauma. She does spend most of the day with feet in dependent positioning while up in wheelchair. She is currently in tubi  to help control swelling to operative side. Edema today 2-3+. Pulses difficult to palpate due to swelling. She reports no pain with plantar flexion. Some pain with dorsiflexion. She had a venous doppler done on 3/2/23 which was negative for DVT. She continues on ASA for DVT prophylaxis. She continues on oxycodone and tylenol for pain. Extremity is not red. Slightly warmer than opposite extremity. Afebrile. Acute blood loss anemia.  Hemoglobin now 10.1.       Assessment/Plan:     Closed left hip fracture, sequela  S/P ORIF (open reduction internal fixation) fracture  -Pt WBAT.   -Pain control with oxycodone and Tylenol.  -ASA 81 mg BID  for DVT prophy.     Left leg pain and edema  -Venous Doppler obtained on 3/2/23 which was negative for DVT.  -Today pt with discolorization between toes and pad of foot. Pt currently in tubi  for swelling.   -Discontinue tubi .   -Arterial doppler today.   -Elevation between therapies.    ABLA (acute blood loss anemia)  -Hgb now 10.1.       Active Ambulatory Problems     Diagnosis Date Noted     Benign neoplasm of ascending colon 01/06/2022     History of colonic polyps 12/07/2016     Cystocele, midline 05/07/2007     Polyp of colon 12/05/2016     Rectocele 05/07/2007     Uterovaginal prolapse, unspecified 05/07/2007     Acute blood loss anemia 02/20/2023     Anxiety 02/21/2023     Closed left hip fracture (H) 02/15/2023     Depression 02/21/2023     HTN (hypertension) 02/21/2023     Hypotension 02/20/2023     RLS (restless legs syndrome) 02/21/2023     Resolved Ambulatory Problems     Diagnosis Date Noted     No Resolved Ambulatory Problems     No Additional Past Medical History     Allergies   Allergen Reactions     Penicillins Palpitations, Anaphylaxis, Hives and Difficulty breathing     Cephalexin Hives     Doxycycline Itching and Rash     Sulfa Drugs Hives     Sulfatolamide Hives       All Meds and Allergies reviewed in the record at the facility and is the most up-to-date.    Current Outpatient Medications   Medication Sig     acetaminophen (TYLENOL) 325 MG tablet Take 650 mg by mouth every 6 hours as needed for mild pain     albuterol (PROAIR HFA/PROVENTIL HFA/VENTOLIN HFA) 108 (90 Base) MCG/ACT inhaler Inhale 1-2 puffs into the lungs every 4 hours as needed     aspirin (ASA) 81 MG chewable tablet Take 81 mg by mouth 2 times daily (with meals)     atenolol (TENORMIN) 25 MG tablet Take 25 mg by mouth daily     calcium carbonate-vitamin D (CALTRATE) 600-10 MG-MCG per tablet Take 1 tablet by mouth 2 times daily     cycloSPORINE (RESTASIS) 0.05 % ophthalmic emulsion Place 1 drop into both eyes every 12  "hours     loperamide (IMODIUM) 2 MG capsule Take 2 mg by mouth 3 times daily as needed for diarrhea     LORazepam (ATIVAN) 0.5 MG tablet Take 0.5 mg by mouth 3 times daily as needed for anxiety     mirtazapine (REMERON) 30 MG tablet Take 30 mg by mouth At Bedtime     Multiple Vitamins-Minerals (MULTI VITAMIN  /MINERALS) TABS Take 1 tablet by mouth daily     oxyCODONE (ROXICODONE) 5 MG tablet Take 1 tablet (5 mg) by mouth 2 times daily. May also take 0.5-1 tablets (2.5-5 mg) every 6 hours as needed for severe pain (7-10).     pramipexole (MIRAPEX) 0.25 MG tablet Take 0.25 mg by mouth At Bedtime     senna-docusate (SENOKOT-S/PERICOLACE) 8.6-50 MG tablet Take 2 tablets by mouth 2 times daily     UNABLE TO FIND Take 1 tablet by mouth 2 times daily MEDICATION NAME: Algaecal tablet; 750 mg calcium, 65 mg magnesium, 25 mcg vitamin D3     No current facility-administered medications for this visit.       REVIEW OF SYSTEMS:   10 point review of systems reviewed and pertinent positives in the HPI.     PHYSICAL EXAMINATION:  Physical Exam     Vital signs: BP (!) 159/69   Pulse 78   Temp 97.8  F (36.6  C)   Resp 16   Ht 1.575 m (5' 2\")   Wt 49.7 kg (109 lb 9.6 oz)   SpO2 95%   BMI 20.05 kg/m    General: Awake, Alert, oriented x3,sitting up in bed,  conversant  HEENT:Pink conjunctiva, moist oral mucosa  NECK: Supple  CVS:  S1  S2, without murmur or gallop.   LUNG: Clear to auscultation, No wheezes, rales or rhonci.  BACK: No kyphosis of the thoracic spine  EXTREMITIES: Moves both upper and lower extremities with some limitation to LLE, 2-3+ pedal edema on the operative side, no calf tenderness. Bluish hue between 2nd and 3rd toes left foot and pad of foot. No redness noted. Slightly warm.   SKIN: Incision dry and intact.   NEUROLOGIC: Intact. Difficulty palpating pulse on left foot due to edema.   PSYCHIATRIC: Cognition intact      Labs:  All labs reviewed in the nursing home record and [facility]     @Last Comprehensive " Metabolic Panel:  Sodium   Date Value Ref Range Status   02/23/2023 140 136 - 145 mmol/L Final     Potassium   Date Value Ref Range Status   02/23/2023 4.1 3.4 - 5.3 mmol/L Final   10/21/2021 4.4 3.5 - 5.0 mmol/L Final     Chloride   Date Value Ref Range Status   02/23/2023 108 (H) 98 - 107 mmol/L Final   10/21/2021 101 98 - 107 mmol/L Final     Carbon Dioxide (CO2)   Date Value Ref Range Status   02/23/2023 24 22 - 29 mmol/L Final   10/21/2021 28 22 - 31 mmol/L Final     Anion Gap   Date Value Ref Range Status   02/23/2023 8 7 - 15 mmol/L Final   10/21/2021 11 5 - 18 mmol/L Final     Glucose   Date Value Ref Range Status   02/23/2023 89 70 - 99 mg/dL Final   10/21/2021 158 (H) 70 - 125 mg/dL Final     Urea Nitrogen   Date Value Ref Range Status   02/23/2023 15.0 8.0 - 23.0 mg/dL Final   10/21/2021 20 8 - 28 mg/dL Final     Creatinine   Date Value Ref Range Status   02/23/2023 0.68 0.51 - 0.95 mg/dL Final     GFR Estimate   Date Value Ref Range Status   02/23/2023 90 >60 mL/min/1.73m2 Final     Comment:     eGFR calculated using 2021 CKD-EPI equation.   10/20/2020 >60 >60 mL/min/1.73m2 Final     Calcium   Date Value Ref Range Status   02/23/2023 8.6 (L) 8.8 - 10.2 mg/dL Final     Voicemail left for pt son Matthew regarding above plan of care.     This note has been dictated using voice recognition software. Any grammatical or context distortions are unintentional and inherent to the software    Electronically signed by: Penelope Hendrix, [employer]         Sincerely,        Penelope Hendrix, NP

## 2023-03-10 NOTE — PROGRESS NOTES
[facility]    Code Status:  FULL CODE   Visit Type:   Chief Complaint   Patient presents with     TCU Follow Up     Facility:  [facility] ([facility]) [[id number]]         HPI: Sharon Weinberg is a 77 year old female who I am seeing today for follow-up on the TCU. Past medical history included depression, RLS, anxiety and HTN. Pt recently hospitalized on 2/14/23 2/t to fall with left hip pain. Pt hit her head but no LOC. Pt found to have a displaced and angulated comminuted fx of the left femoral neck fracture. Pt underwent ORIF on 2/15/23. Pt treated for pain with oxycodone. ASA for DVT prophylaxis. Pt had hypotension post operatively due to acute blood loss anemia. Hgb dropped from 13.1 to 9.3. She did not require transfusion however did receive fluids for resuscitation.     Transitional Care Course: Today pt sitting up in bed. Recent left hip fracture with ORIF. Today nursing staff reporting increased swelling to LLE. She now has bruising/bluish discolorization between 2nd and 3rd digit and bottom of foot. She denies any acute injury or trauma. She does spend most of the day with feet in dependent positioning while up in wheelchair. She is currently in tubi  to help control swelling to operative side. Edema today 2-3+. Pulses difficult to palpate due to swelling. She reports no pain with plantar flexion. Some pain with dorsiflexion. She had a venous doppler done on 3/2/23 which was negative for DVT. She continues on ASA for DVT prophylaxis. She continues on oxycodone and tylenol for pain. Extremity is not red. Slightly warmer than opposite extremity. Afebrile. Acute blood loss anemia.  Hemoglobin now 10.1.       Assessment/Plan:     Closed left hip fracture, sequela  S/P ORIF (open reduction internal fixation) fracture  -Pt WBAT.   -Pain control with oxycodone and Tylenol.  -ASA 81 mg BID for DVT prophy.     Left leg pain and edema  -Venous Doppler obtained on 3/2/23 which was negative  for DVT.  -Today pt with discolorization between toes and pad of foot. Pt currently in tubi  for swelling.   -Discontinue tubi .   -Arterial doppler today.   -Elevation between therapies.    ABLA (acute blood loss anemia)  -Hgb now 10.1.       Active Ambulatory Problems     Diagnosis Date Noted     Benign neoplasm of ascending colon 01/06/2022     History of colonic polyps 12/07/2016     Cystocele, midline 05/07/2007     Polyp of colon 12/05/2016     Rectocele 05/07/2007     Uterovaginal prolapse, unspecified 05/07/2007     Acute blood loss anemia 02/20/2023     Anxiety 02/21/2023     Closed left hip fracture (H) 02/15/2023     Depression 02/21/2023     HTN (hypertension) 02/21/2023     Hypotension 02/20/2023     RLS (restless legs syndrome) 02/21/2023     Resolved Ambulatory Problems     Diagnosis Date Noted     No Resolved Ambulatory Problems     No Additional Past Medical History     Allergies   Allergen Reactions     Penicillins Palpitations, Anaphylaxis, Hives and Difficulty breathing     Cephalexin Hives     Doxycycline Itching and Rash     Sulfa Drugs Hives     Sulfatolamide Hives       All Meds and Allergies reviewed in the record at the facility and is the most up-to-date.    Current Outpatient Medications   Medication Sig     acetaminophen (TYLENOL) 325 MG tablet Take 650 mg by mouth every 6 hours as needed for mild pain     albuterol (PROAIR HFA/PROVENTIL HFA/VENTOLIN HFA) 108 (90 Base) MCG/ACT inhaler Inhale 1-2 puffs into the lungs every 4 hours as needed     aspirin (ASA) 81 MG chewable tablet Take 81 mg by mouth 2 times daily (with meals)     atenolol (TENORMIN) 25 MG tablet Take 25 mg by mouth daily     calcium carbonate-vitamin D (CALTRATE) 600-10 MG-MCG per tablet Take 1 tablet by mouth 2 times daily     cycloSPORINE (RESTASIS) 0.05 % ophthalmic emulsion Place 1 drop into both eyes every 12 hours     loperamide (IMODIUM) 2 MG capsule Take 2 mg by mouth 3 times daily as needed for diarrhea  "    LORazepam (ATIVAN) 0.5 MG tablet Take 0.5 mg by mouth 3 times daily as needed for anxiety     mirtazapine (REMERON) 30 MG tablet Take 30 mg by mouth At Bedtime     Multiple Vitamins-Minerals (MULTI VITAMIN  /MINERALS) TABS Take 1 tablet by mouth daily     oxyCODONE (ROXICODONE) 5 MG tablet Take 1 tablet (5 mg) by mouth 2 times daily. May also take 0.5-1 tablets (2.5-5 mg) every 6 hours as needed for severe pain (7-10).     pramipexole (MIRAPEX) 0.25 MG tablet Take 0.25 mg by mouth At Bedtime     senna-docusate (SENOKOT-S/PERICOLACE) 8.6-50 MG tablet Take 2 tablets by mouth 2 times daily     UNABLE TO FIND Take 1 tablet by mouth 2 times daily MEDICATION NAME: Algaecal tablet; 750 mg calcium, 65 mg magnesium, 25 mcg vitamin D3     No current facility-administered medications for this visit.       REVIEW OF SYSTEMS:   10 point review of systems reviewed and pertinent positives in the HPI.     PHYSICAL EXAMINATION:  Physical Exam     Vital signs: BP (!) 159/69   Pulse 78   Temp 97.8  F (36.6  C)   Resp 16   Ht 1.575 m (5' 2\")   Wt 49.7 kg (109 lb 9.6 oz)   SpO2 95%   BMI 20.05 kg/m    General: Awake, Alert, oriented x3,sitting up in bed,  conversant  HEENT:Pink conjunctiva, moist oral mucosa  NECK: Supple  CVS:  S1  S2, without murmur or gallop.   LUNG: Clear to auscultation, No wheezes, rales or rhonci.  BACK: No kyphosis of the thoracic spine  EXTREMITIES: Moves both upper and lower extremities with some limitation to LLE, 2-3+ pedal edema on the operative side, no calf tenderness. Bluish hue between 2nd and 3rd toes left foot and pad of foot. No redness noted. Slightly warm.   SKIN: Incision dry and intact.   NEUROLOGIC: Intact. Difficulty palpating pulse on left foot due to edema.   PSYCHIATRIC: Cognition intact      Labs:  All labs reviewed in the nursing home record and [facility]     @[facility] Comprehensive Metabolic Panel:  Sodium   Date Value Ref Range Status   02/23/2023 140 136 - 145 mmol/L Final "     Potassium   Date Value Ref Range Status   02/23/2023 4.1 3.4 - 5.3 mmol/L Final   10/21/2021 4.4 3.5 - 5.0 mmol/L Final     Chloride   Date Value Ref Range Status   02/23/2023 108 (H) 98 - 107 mmol/L Final   10/21/2021 101 98 - 107 mmol/L Final     Carbon Dioxide (CO2)   Date Value Ref Range Status   02/23/2023 24 22 - 29 mmol/L Final   10/21/2021 28 22 - 31 mmol/L Final     Anion Gap   Date Value Ref Range Status   02/23/2023 8 7 - 15 mmol/L Final   10/21/2021 11 5 - 18 mmol/L Final     Glucose   Date Value Ref Range Status   02/23/2023 89 70 - 99 mg/dL Final   10/21/2021 158 (H) 70 - 125 mg/dL Final     Urea Nitrogen   Date Value Ref Range Status   02/23/2023 15.0 8.0 - 23.0 mg/dL Final   10/21/2021 20 8 - 28 mg/dL Final     Creatinine   Date Value Ref Range Status   02/23/2023 0.68 0.51 - 0.95 mg/dL Final     GFR Estimate   Date Value Ref Range Status   02/23/2023 90 >60 mL/min/1.73m2 Final     Comment:     eGFR calculated using 2021 CKD-EPI equation.   10/20/2020 >60 >60 mL/min/1.73m2 Final     Calcium   Date Value Ref Range Status   02/23/2023 8.6 (L) 8.8 - 10.2 mg/dL Final     Voicemail left for pt son Matthew regarding above plan of care.     This note has been dictated using voice recognition software. Any grammatical or context distortions are unintentional and inherent to the software    Electronically signed by: Penelope Hendrix, CNP

## 2023-03-13 ENCOUNTER — DISCHARGE SUMMARY NURSING HOME (OUTPATIENT)
Dept: GERIATRICS | Facility: CLINIC | Age: 78
End: 2023-03-13
Payer: COMMERCIAL

## 2023-03-13 VITALS
RESPIRATION RATE: 16 BRPM | TEMPERATURE: 98.1 F | WEIGHT: 119 LBS | BODY MASS INDEX: 21.9 KG/M2 | SYSTOLIC BLOOD PRESSURE: 153 MMHG | HEIGHT: 62 IN | DIASTOLIC BLOOD PRESSURE: 77 MMHG | HEART RATE: 89 BPM | OXYGEN SATURATION: 96 %

## 2023-03-13 DIAGNOSIS — R52 PAIN: ICD-10-CM

## 2023-03-13 PROCEDURE — 99316 NF DSCHRG MGMT 30 MIN+: CPT | Performed by: NURSE PRACTITIONER

## 2023-03-13 RX ORDER — OXYCODONE HYDROCHLORIDE 5 MG/1
TABLET ORAL
Qty: 30 TABLET | Refills: 0 | Status: SHIPPED | OUTPATIENT
Start: 2023-03-13 | End: 2023-07-24

## 2023-03-13 NOTE — LETTER
3/13/2023        RE: Sharon Weinberg  [address]  [facility] [address]           [facility]    Code Status:  FULL CODE   Visit Type:   Chief Complaint   Patient presents with     TCU Discharge     Facility:  [facility]) [[id number]]         HPI: Sharon Weinberg is a 77 year old female who I am seeing today for discharge from the TCU. Past medical history included depression, RLS, anxiety and HTN. Pt recently hospitalized on 2/14/23 2/t to fall with left hip pain. Pt hit her head but no LOC. Pt found to have a displaced and angulated comminuted fx of the left femoral neck fracture. Pt underwent ORIF on 2/15/23. Pt treated for pain with oxycodone. ASA for DVT prophylaxis. Pt had hypotension post operatively due to acute blood loss anemia. Hgb dropped from 13.1 to 9.3. She did not require transfusion however did receive fluids for resuscitation.     Transitional Care Course: Today pt sitting up in bed. Recent left hip fracture with ORIF.  Patient continues on oxycodone 5 mg scheduled twice daily for pain.  We will attempt to discontinue this and change to as needed prior to discharge on 3/17/2023.  She continues with swelling in the left lower extremity.  She did develop some bruising and a bluish hue across the second and third digits as well as the bottom of her left foot.  Venous Doppler was obtained which was negative.  She was also sent for arterial Doppler which returned negative.  She continues an Ace wrap.  Ankle pumps encouraged while in bed.  She continues on aspirin for DVT prophylactics.  She is moving somewhat slowly.  Acute blood loss anemia.  Hemoglobin stabilized at 10.1.  Patient denies any shortness of breath or chest pain.  She was treated previously for viral gastritis.  No further symptomology.      Assessment/Plan: Reviewed with changes.    Closed left hip fracture, sequela  S/P ORIF (open reduction internal fixation) fracture  -Pt WBAT.   -Pain  control with oxycodone and Tylenol.  Will attempt to DC scheduled oxycodone prior to discharge and resume every 6 hours as needed dosing.  -ASA 81 mg BID for DVT prophy.     Left leg pain and edema  -Venous Doppler obtained on 3/2/23 which was negative for DVT.  -Later developed discolorization between toes and pad of foot.  Arterial Doppler obtained which was negative on 3/10/2023.  -Discontinue tubi .  Okay for Ace wrap.  -Elevation between therapies.  -Ankle pumps every hour between therapies.  -Early ambulation encouraged.    ABLA (acute blood loss anemia)  -Hgb now 10.1.       Active Ambulatory Problems     Diagnosis Date Noted     Benign neoplasm of ascending colon 01/06/2022     History of colonic polyps 12/07/2016     Cystocele, midline 05/07/2007     Polyp of colon 12/05/2016     Rectocele 05/07/2007     Uterovaginal prolapse, unspecified 05/07/2007     Acute blood loss anemia 02/20/2023     Anxiety 02/21/2023     Closed left hip fracture (H) 02/15/2023     Depression 02/21/2023     HTN (hypertension) 02/21/2023     Hypotension 02/20/2023     RLS (restless legs syndrome) 02/21/2023     Resolved Ambulatory Problems     Diagnosis Date Noted     No Resolved Ambulatory Problems     No Additional Past Medical History     Allergies   Allergen Reactions     Penicillins Palpitations, Anaphylaxis, Hives and Difficulty breathing     Cephalexin Hives     Doxycycline Itching and Rash     Sulfa Drugs Hives     Sulfatolamide Hives       All Meds and Allergies reviewed in the record at the facility and is the most up-to-date.    Current Outpatient Medications   Medication Sig     acetaminophen (TYLENOL) 325 MG tablet Take 650 mg by mouth every 6 hours as needed for mild pain     albuterol (PROAIR HFA/PROVENTIL HFA/VENTOLIN HFA) 108 (90 Base) MCG/ACT inhaler Inhale 1-2 puffs into the lungs every 4 hours as needed     aspirin (ASA) 81 MG chewable tablet Take 81 mg by mouth 2 times daily (with meals)     atenolol  "(TENORMIN) 25 MG tablet Take 25 mg by mouth daily     calcium carbonate-vitamin D (CALTRATE) 600-10 MG-MCG per tablet Take 1 tablet by mouth 2 times daily     cycloSPORINE (RESTASIS) 0.05 % ophthalmic emulsion Place 1 drop into both eyes every 12 hours     mirtazapine (REMERON) 30 MG tablet Take 30 mg by mouth At Bedtime     Multiple Vitamins-Minerals (MULTI VITAMIN  /MINERALS) TABS Take 1 tablet by mouth daily     oxyCODONE (ROXICODONE) 5 MG tablet Take 1 tablet (5 mg) by mouth 2 times daily. May also take 0.5-1 tablets (2.5-5 mg) every 6 hours as needed for severe pain (7-10).     pramipexole (MIRAPEX) 0.25 MG tablet Take 0.25 mg by mouth At Bedtime     senna-docusate (SENOKOT-S/PERICOLACE) 8.6-50 MG tablet Take 2 tablets by mouth 2 times daily     UNABLE TO FIND Take 1 tablet by mouth 2 times daily MEDICATION NAME: Algaecal tablet; 750 mg calcium, 65 mg magnesium, 25 mcg vitamin D3     No current facility-administered medications for this visit.       REVIEW OF SYSTEMS:   10 point review of systems reviewed and pertinent positives in the HPI.     PHYSICAL EXAMINATION:  Physical Exam     Vital signs: BP (!) 153/77   Pulse 89   Temp 98.1  F (36.7  C)   Resp 16   Ht 1.575 m (5' 2\")   Wt 54 kg (119 lb)   SpO2 96%   BMI 21.77 kg/m    General: Awake, Alert, oriented x3, sitting up in bed,conversant  HEENT:Pink conjunctiva, moist oral mucosa  NECK: Supple  CVS:  S1  S2, without murmur or gallop.   LUNG: Clear to auscultation, No wheezes, rales or rhonci.  BACK: No kyphosis of the thoracic spine  EXTREMITIES: Moves both upper and lower extremities with some limitation to LLE, 2+ pedal edema on the operative side, no calf tenderness. Bluish hue between 2nd and 3rd toes left foot and pad of foot-now starting to subside.  No redness noted. Slightly warm.   SKIN: Incision dry and intact, open to air.  NEUROLOGIC: Intact.  PSYCHIATRIC: Cognition intact      Labs:  All labs reviewed in the nursing home record and Epic "     @[facility] Comprehensive Metabolic Panel:  Sodium   Date Value Ref Range Status   02/23/2023 140 136 - 145 mmol/L Final     Potassium   Date Value Ref Range Status   02/23/2023 4.1 3.4 - 5.3 mmol/L Final   10/21/2021 4.4 3.5 - 5.0 mmol/L Final     Chloride   Date Value Ref Range Status   02/23/2023 108 (H) 98 - 107 mmol/L Final   10/21/2021 101 98 - 107 mmol/L Final     Carbon Dioxide (CO2)   Date Value Ref Range Status   02/23/2023 24 22 - 29 mmol/L Final   10/21/2021 28 22 - 31 mmol/L Final     Anion Gap   Date Value Ref Range Status   02/23/2023 8 7 - 15 mmol/L Final   10/21/2021 11 5 - 18 mmol/L Final     Glucose   Date Value Ref Range Status   02/23/2023 89 70 - 99 mg/dL Final   10/21/2021 158 (H) 70 - 125 mg/dL Final     Urea Nitrogen   Date Value Ref Range Status   02/23/2023 15.0 8.0 - 23.0 mg/dL Final   10/21/2021 20 8 - 28 mg/dL Final     Creatinine   Date Value Ref Range Status   02/23/2023 0.68 0.51 - 0.95 mg/dL Final     GFR Estimate   Date Value Ref Range Status   02/23/2023 90 >60 mL/min/1.73m2 Final     Comment:     eGFR calculated using 2021 CKD-EPI equation.   10/20/2020 >60 >60 mL/min/1.73m2 Final     Calcium   Date Value Ref Range Status   02/23/2023 8.6 (L) 8.8 - 10.2 mg/dL Final     Okay to DC to [facility] with current meds and treatments including #15 tabs of oxycodone.  Home PT, OT, home health aide and RN for medication management.  Follow-up with a primary care provider in 1 to 2 weeks.  Follow-up with Ortho on 3/30/2023.    DISCHARGE PLAN/FACE TO FACE:  I certify that this patient is under my care and that I, or a nurse practitioner or physician's assistant working with me, had a face-to-face encounter that meets the physician face-to-face encounter requirements with this patient.       I certify that, based on my findings, the following services are medically necessary home health services.    My clinical findings support the need for the above skilled services.    This patient is homebound  because: Left hip fracture status post ORIF.    The patient is, or has been, under my care and I have initiated the establishment of the plan of care. This patient will be followed by a physician who will periodically review the plan of care.    Consent for this visit was 35 minutes which included reviewing discharge medications, home care services and follow-ups as well as instructions on ankle pumps and collaborating with nursing staff and .    This note has been dictated using voice recognition software. Any grammatical or context distortions are unintentional and inherent to the software    Electronically signed by: Penelope Hendrix CNP         Sincerely,        Penelope Hendrix NP

## 2023-03-14 NOTE — PROGRESS NOTES
[facility] GERIATRIC SERVICES    Code Status:  FULL CODE   Visit Type:   Chief Complaint   Patient presents with     TCU Discharge     Facility:  [facility] ([facility]) [[id number]]         HPI: Sharon Weinberg is a 77 year old female who I am seeing today for discharge from the TCU. Past medical history included depression, RLS, anxiety and HTN. Pt recently hospitalized on 2/14/23 2/t to fall with left hip pain. Pt hit her head but no LOC. Pt found to have a displaced and angulated comminuted fx of the left femoral neck fracture. Pt underwent ORIF on 2/15/23. Pt treated for pain with oxycodone. ASA for DVT prophylaxis. Pt had hypotension post operatively due to acute blood loss anemia. Hgb dropped from 13.1 to 9.3. She did not require transfusion however did receive fluids for resuscitation.     Transitional Care Course: Today pt sitting up in bed. Recent left hip fracture with ORIF.  Patient continues on oxycodone 5 mg scheduled twice daily for pain.  We will attempt to discontinue this and change to as needed prior to discharge on 3/17/2023.  She continues with swelling in the left lower extremity.  She did develop some bruising and a bluish hue across the second and third digits as well as the bottom of her left foot.  Venous Doppler was obtained which was negative.  She was also sent for arterial Doppler which returned negative.  She continues an Ace wrap.  Ankle pumps encouraged while in bed.  She continues on aspirin for DVT prophylactics.  She is moving somewhat slowly.  Acute blood loss anemia.  Hemoglobin stabilized at 10.1.  Patient denies any shortness of breath or chest pain.  She was treated previously for viral gastritis.  No further symptomology.      Assessment/Plan: Reviewed with changes.    Closed left hip fracture, sequela  S/P ORIF (open reduction internal fixation) fracture  -Pt WBAT.   -Pain control with oxycodone and Tylenol.  Will attempt to DC scheduled oxycodone prior to discharge  and resume every 6 hours as needed dosing.  -ASA 81 mg BID for DVT prophy.     Left leg pain and edema  -Venous Doppler obtained on 3/2/23 which was negative for DVT.  -Later developed discolorization between toes and pad of foot.  Arterial Doppler obtained which was negative on 3/10/2023.  -Discontinue tubi .  Okay for Ace wrap.  -Elevation between therapies.  -Ankle pumps every hour between therapies.  -Early ambulation encouraged.    ABLA (acute blood loss anemia)  -Hgb now 10.1.       Active Ambulatory Problems     Diagnosis Date Noted     Benign neoplasm of ascending colon 01/06/2022     History of colonic polyps 12/07/2016     Cystocele, midline 05/07/2007     Polyp of colon 12/05/2016     Rectocele 05/07/2007     Uterovaginal prolapse, unspecified 05/07/2007     Acute blood loss anemia 02/20/2023     Anxiety 02/21/2023     Closed left hip fracture (H) 02/15/2023     Depression 02/21/2023     HTN (hypertension) 02/21/2023     Hypotension 02/20/2023     RLS (restless legs syndrome) 02/21/2023     Resolved Ambulatory Problems     Diagnosis Date Noted     No Resolved Ambulatory Problems     No Additional Past Medical History     Allergies   Allergen Reactions     Penicillins Palpitations, Anaphylaxis, Hives and Difficulty breathing     Cephalexin Hives     Doxycycline Itching and Rash     Sulfa Drugs Hives     Sulfatolamide Hives       All Meds and Allergies reviewed in the record at the facility and is the most up-to-date.    Current Outpatient Medications   Medication Sig     acetaminophen (TYLENOL) 325 MG tablet Take 650 mg by mouth every 6 hours as needed for mild pain     albuterol (PROAIR HFA/PROVENTIL HFA/VENTOLIN HFA) 108 (90 Base) MCG/ACT inhaler Inhale 1-2 puffs into the lungs every 4 hours as needed     aspirin (ASA) 81 MG chewable tablet Take 81 mg by mouth 2 times daily (with meals)     atenolol (TENORMIN) 25 MG tablet Take 25 mg by mouth daily     calcium carbonate-vitamin D (CALTRATE) 600-10  "MG-MCG per tablet Take 1 tablet by mouth 2 times daily     cycloSPORINE (RESTASIS) 0.05 % ophthalmic emulsion Place 1 drop into both eyes every 12 hours     mirtazapine (REMERON) 30 MG tablet Take 30 mg by mouth At Bedtime     Multiple Vitamins-Minerals (MULTI VITAMIN  /MINERALS) TABS Take 1 tablet by mouth daily     oxyCODONE (ROXICODONE) 5 MG tablet Take 1 tablet (5 mg) by mouth 2 times daily. May also take 0.5-1 tablets (2.5-5 mg) every 6 hours as needed for severe pain (7-10).     pramipexole (MIRAPEX) 0.25 MG tablet Take 0.25 mg by mouth At Bedtime     senna-docusate (SENOKOT-S/PERICOLACE) 8.6-50 MG tablet Take 2 tablets by mouth 2 times daily     UNABLE TO FIND Take 1 tablet by mouth 2 times daily MEDICATION NAME: Algaecal tablet; 750 mg calcium, 65 mg magnesium, 25 mcg vitamin D3     No current facility-administered medications for this visit.       REVIEW OF SYSTEMS:   10 point review of systems reviewed and pertinent positives in the HPI.     PHYSICAL EXAMINATION:  Physical Exam     Vital signs: BP (!) 153/77   Pulse 89   Temp 98.1  F (36.7  C)   Resp 16   Ht 1.575 m (5' 2\")   Wt 54 kg (119 lb)   SpO2 96%   BMI 21.77 kg/m    General: Awake, Alert, oriented x3, sitting up in bed,conversant  HEENT:Pink conjunctiva, moist oral mucosa  NECK: Supple  CVS:  S1  S2, without murmur or gallop.   LUNG: Clear to auscultation, No wheezes, rales or rhonci.  BACK: No kyphosis of the thoracic spine  EXTREMITIES: Moves both upper and lower extremities with some limitation to LLE, 2+ pedal edema on the operative side, no calf tenderness. Bluish hue between 2nd and 3rd toes left foot and pad of foot-now starting to subside.  No redness noted. Slightly warm.   SKIN: Incision dry and intact, open to air.  NEUROLOGIC: Intact.  PSYCHIATRIC: Cognition intact      Labs:  All labs reviewed in the nursing home record and [facility]     @[facility] Comprehensive Metabolic Panel:  Sodium   Date Value Ref Range Status   02/23/2023 140 136 - " 145 mmol/L Final     Potassium   Date Value Ref Range Status   02/23/2023 4.1 3.4 - 5.3 mmol/L Final   10/21/2021 4.4 3.5 - 5.0 mmol/L Final     Chloride   Date Value Ref Range Status   02/23/2023 108 (H) 98 - 107 mmol/L Final   10/21/2021 101 98 - 107 mmol/L Final     Carbon Dioxide (CO2)   Date Value Ref Range Status   02/23/2023 24 22 - 29 mmol/L Final   10/21/2021 28 22 - 31 mmol/L Final     Anion Gap   Date Value Ref Range Status   02/23/2023 8 7 - 15 mmol/L Final   10/21/2021 11 5 - 18 mmol/L Final     Glucose   Date Value Ref Range Status   02/23/2023 89 70 - 99 mg/dL Final   10/21/2021 158 (H) 70 - 125 mg/dL Final     Urea Nitrogen   Date Value Ref Range Status   02/23/2023 15.0 8.0 - 23.0 mg/dL Final   10/21/2021 20 8 - 28 mg/dL Final     Creatinine   Date Value Ref Range Status   02/23/2023 0.68 0.51 - 0.95 mg/dL Final     GFR Estimate   Date Value Ref Range Status   02/23/2023 90 >60 mL/min/1.73m2 Final     Comment:     eGFR calculated using 2021 CKD-EPI equation.   10/20/2020 >60 >60 mL/min/1.73m2 Final     Calcium   Date Value Ref Range Status   02/23/2023 8.6 (L) 8.8 - 10.2 mg/dL Final     Okay to DC to [facility] with current meds and treatments including #15 tabs of oxycodone.  Home PT, OT, home health aide and RN for medication management.  Follow-up with a primary care provider in 1 to 2 weeks.  Follow-up with Ortho on 3/30/2023.    DISCHARGE PLAN/FACE TO FACE:  I certify that this patient is under my care and that I, or a nurse practitioner or physician's assistant working with me, had a face-to-face encounter that meets the physician face-to-face encounter requirements with this patient.       I certify that, based on my findings, the following services are medically necessary home health services.    My clinical findings support the need for the above skilled services.    This patient is homebound because: Left hip fracture status post ORIF.    The patient is, or has been, under my care and I have  initiated the establishment of the plan of care. This patient will be followed by a physician who will periodically review the plan of care.    Consent for this visit was 35 minutes which included reviewing discharge medications, home care services and follow-ups as well as instructions on ankle pumps and collaborating with nursing staff and .    This note has been dictated using voice recognition software. Any grammatical or context distortions are unintentional and inherent to the software    Electronically signed by: Penelope Hendrix CNP

## 2023-03-17 ENCOUNTER — MEDICAL CORRESPONDENCE (OUTPATIENT)
Dept: HEALTH INFORMATION MANAGEMENT | Facility: CLINIC | Age: 78
End: 2023-03-17

## 2023-04-10 ENCOUNTER — LAB REQUISITION (OUTPATIENT)
Dept: LAB | Facility: CLINIC | Age: 78
End: 2023-04-10

## 2023-04-10 DIAGNOSIS — R41.3 OTHER AMNESIA: ICD-10-CM

## 2023-04-10 DIAGNOSIS — I10 ESSENTIAL (PRIMARY) HYPERTENSION: ICD-10-CM

## 2023-04-10 LAB
ANION GAP SERPL CALCULATED.3IONS-SCNC: 13 MMOL/L (ref 7–15)
BUN SERPL-MCNC: 18.1 MG/DL (ref 8–23)
CALCIUM SERPL-MCNC: 9.8 MG/DL (ref 8.8–10.2)
CHLORIDE SERPL-SCNC: 105 MMOL/L (ref 98–107)
CREAT SERPL-MCNC: 0.72 MG/DL (ref 0.51–0.95)
DEPRECATED HCO3 PLAS-SCNC: 24 MMOL/L (ref 22–29)
FOLATE SERPL-MCNC: 18.7 NG/ML (ref 4.6–34.8)
GFR SERPL CREATININE-BSD FRML MDRD: 86 ML/MIN/1.73M2
GLUCOSE SERPL-MCNC: 83 MG/DL (ref 70–99)
POTASSIUM SERPL-SCNC: 4.6 MMOL/L (ref 3.4–5.3)
SODIUM SERPL-SCNC: 142 MMOL/L (ref 136–145)
TSH SERPL DL<=0.005 MIU/L-ACNC: 1.14 UIU/ML (ref 0.3–4.2)
VIT B12 SERPL-MCNC: 681 PG/ML (ref 232–1245)

## 2023-04-10 PROCEDURE — 82607 VITAMIN B-12: CPT | Performed by: PHYSICIAN ASSISTANT

## 2023-04-10 PROCEDURE — 84443 ASSAY THYROID STIM HORMONE: CPT | Performed by: PHYSICIAN ASSISTANT

## 2023-04-10 PROCEDURE — 80048 BASIC METABOLIC PNL TOTAL CA: CPT | Performed by: PHYSICIAN ASSISTANT

## 2023-04-10 PROCEDURE — 82746 ASSAY OF FOLIC ACID SERUM: CPT | Performed by: PHYSICIAN ASSISTANT

## 2023-06-26 ENCOUNTER — TRANSFERRED RECORDS (OUTPATIENT)
Dept: HEALTH INFORMATION MANAGEMENT | Facility: CLINIC | Age: 78
End: 2023-06-26

## 2023-07-15 ENCOUNTER — TRANSFERRED RECORDS (OUTPATIENT)
Dept: HEALTH INFORMATION MANAGEMENT | Facility: CLINIC | Age: 78
End: 2023-07-15
Payer: COMMERCIAL

## 2023-07-17 ENCOUNTER — OFFICE VISIT (OUTPATIENT)
Dept: PHARMACY | Facility: PHYSICIAN GROUP | Age: 78
End: 2023-07-17
Payer: COMMERCIAL

## 2023-07-17 DIAGNOSIS — G25.81 RESTLESS LEGS SYNDROME (RLS): ICD-10-CM

## 2023-07-17 DIAGNOSIS — R41.3 MEMORY LOSS: ICD-10-CM

## 2023-07-17 DIAGNOSIS — F41.1 GENERALIZED ANXIETY DISORDER: Primary | ICD-10-CM

## 2023-07-17 DIAGNOSIS — G47.00 PERSISTENT INSOMNIA: ICD-10-CM

## 2023-07-17 DIAGNOSIS — J44.9 CHRONIC OBSTRUCTIVE PULMONARY DISEASE, UNSPECIFIED COPD TYPE (H): ICD-10-CM

## 2023-07-17 DIAGNOSIS — I10 BENIGN ESSENTIAL HYPERTENSION: ICD-10-CM

## 2023-07-17 DIAGNOSIS — H04.123 DRY EYES: ICD-10-CM

## 2023-07-17 DIAGNOSIS — Z78.9 TAKES DIETARY SUPPLEMENTS: ICD-10-CM

## 2023-07-17 DIAGNOSIS — M81.0 AGE-RELATED OSTEOPOROSIS WITHOUT CURRENT PATHOLOGICAL FRACTURE: ICD-10-CM

## 2023-07-17 DIAGNOSIS — R52 PAIN: ICD-10-CM

## 2023-07-17 DIAGNOSIS — R00.0 SINUS TACHYCARDIA: ICD-10-CM

## 2023-07-17 PROCEDURE — 99605 MTMS BY PHARM NP 15 MIN: CPT | Performed by: PHARMACIST

## 2023-07-17 PROCEDURE — 99607 MTMS BY PHARM ADDL 15 MIN: CPT | Performed by: PHARMACIST

## 2023-07-17 NOTE — LETTER
_  Medication List        Prepared on: 07/24/2023     Bring your Medication List when you go to the doctor, hospital, or   emergency room. And, share it with your family or caregivers.     Note any changes to how you take your medications.  Cross out medications when you no longer use them.    Medication How I take it Why I use it Prescriber   acetaminophen (TYLENOL) 325 MG tablet Take 650 mg by mouth every 6 hours as needed for mild pain Pain Patient Reported   albuterol (PROAIR HFA/PROVENTIL HFA/VENTOLIN HFA) 108 (90 Base) MCG/ACT inhaler Inhale 1-2 puffs into the lungs every 4 hours as needed COPD Felisha Dejesus PA-C    atenolol (TENORMIN) 25 MG tablet Take 25 mg by mouth daily Hypertension/sinus tachycardia Felisha Dejesus PA-C    calcium carbonate 500 mg, elemental, (OSCAL 500) 1250 (500 Ca) MG TABS tablet Take 1 tablet by mouth 2 times daily Osteoporosis Felisha Dejesus PA-C    cycloSPORINE (RESTASIS) 0.05 % ophthalmic emulsion Place 1 drop into both eyes every 12 hours Drying and Inflammation of Cornea and Conjunctiva of Eyes Ophthalmologist   escitalopram (LEXAPRO) 10 MG tablet HALF A TAB PER DAY FOR 2 WEEKS THEN INCREASE TO 1 TAB DAILY ORALLY ONCE A DAY Anxiety Felisha Dejesus PA-C    Flaxseed, Linseed, (FLAX SEEDS) POWD Take 1 teaspoonful by mouth daily with food Fiber/General health Patient Reported   LORazepam (ATIVAN) 0.5 MG tablet Take 0.5 mg by mouth 2 times daily as needed Anxiety Felisha Dejesus PA-C    mirtazapine (REMERON) 30 MG tablet Take 30 mg by mouth At Bedtime Sleep and anxiety Felisha Dejesus PA-C    Multiple Vitamins-Minerals (MULTI VITAMIN  /MINERALS) TABS Take 1 tablet by mouth daily Vitamin and/or Mineral Deficiency Patient Reported   pramipexole (MIRAPEX) 1 MG tablet Take 2 mg by mouth At Bedtime Restless leg syndrome Felisha Dejesus PA-C    Vitamin D3 (VITAMIN D, CHOLECALCIFEROL,) 25 mcg (1000 units) tablet Take 1 tablet by mouth daily Osteoporosis Felisha Dejesus PA-C          Add  new medications, over-the-counter drugs, herbals, vitamins, or  minerals in the blank rows below.    Medication How I take it Why I use it Prescriber                                      Allergies:      penicillins; cephalexin; doxycycline; sulfa antibiotics; sulfatolamide        Side effects I have had:               Other Information:              My notes and questions:

## 2023-07-17 NOTE — LETTER
July 24, 2023  Sharon Marlowmata  [address] [address]    Dear MsYeimy Lenard, [facility]     Thank you for talking with me on Jul 17, 2023 about your health and medications. As a follow-up to our conversation, I have included two documents:      1. Your Recommended To-Do List has steps you should take to get the best results from your medications.  2. Your Medication List will help you keep track of your medications and how to take them.    If you want to talk about these documents, please call Caitlyn Jensen RPH at phone: [phone], Monday-Friday 8-4:30pm.    I look forward to working with you and your doctors to make sure your medications work well for you.    Sincerely,  Caitlyn Jensen [facility] Pharmacist, [facility]

## 2023-07-17 NOTE — LETTER
"Recommended To-Do List      Prepared on: 07/24/2023       You can get the best results from your medications by completing the items on this \"To-Do List.\"      Bring your To-Do List when you go to your doctor. And, share it with your family or caregivers.    My To-Do List:  What we talked about: What I should do:   Your medication dosage being too low    Increase your dosage of calcium carbonate 500 mg (elemental) (OSCAL 500). Your goal is to get 1200 mg calcium daily for bone health. Your body can only absorb about 500 mg at one time. So a good guideline is to take a calcium supplement 500 mg twice daily and then get one serving of dairy or green leafy vegetables daily (each serving is about 300 mg calcium).    Your goal is to get 1000 international units vitamin D daily.    We will further discuss with Felisha Dejesus PA-C which osteoporosis medication would be helpful for you to reduce risk of fracture.          What we talked about: What I should do:   A medication that you may no longer need    Change the medication you are taking from LORazepam (ATIVAN) to escitalopram.    1.  Start escitalopram 5 mg once daily for 1 week.  Then increase to escitalopram 10 mg daily.    2.  Work to decrease lorazepam by 1 tablet every 5 to 7 days.          What we talked about: What I should do:   A medication that you may no longer need    Stop taking cod liver oil          What we talked about: What I should do:                     "

## 2023-07-17 NOTE — PROGRESS NOTES
Medication Therapy Management (MTM) Encounter    ASSESSMENT:                            Medication Adherence/Access: No issues identified  -----------------------------------------  Anxiety/RLS/insomnia/memory loss: Discussed risk/benefit of starting escitalopram today and spent much of the time discussing low risk for side effects.  Emphasize risk of lorazepam on cognition and CNS depression.  Patient agreeable to start escitalopram with close monitoring and reduce lorazepam.  Likely to decrease mirtazapine as higher doses more activating -we will continue to monitor as patient does not report side effects at this time.  -----------------------------------------  Pain: Stable.  -----------------------------------------  Hypertension/sinus tachycardia: Blood pressure not at goal less than 130/80 mmHg today.  Likely secondary to anxiety as blood pressures at home and recently in clinic well controlled.  We will work to control anxiety to help blood pressure.  At future visit, recommend replacing atenolol with metoprolol succinate for more consistent kinetics.  -----------------------------------------  COPD: Patient symptoms are stable.  Unclear diagnosis, would recommend further work-up for diagnosis -if COPD, recommend controller inhaler (LAMA/LABA).  Asthma may consider smart therapy as symptoms well controlled.  -----------------------------------------  Osteoporosis: Patient not currently taking osteoporosis therapy.  Discussed bisphosphonates today's.  Patient declined starting today.  Would like further discussion with care team as it was not clear if she had osteoporosis.  Patient meeting goal calcium 1200 mg daily with calcium supplement and diet.  Not meeting NOF recommended 1000 IU vitamin D daily.  Due for vitamin D level at next visit with other labs.   -----------------------------------------  Dry Eye: Stable.    -----------------------------------------    Supplements: Recommend stopping cod liver oil  due to risk of aspiration -no significant data to support health.    PLAN:                            1.  Start escitalopram 5 mg once daily for 1 week.  Then increase to escitalopram 10 mg daily.    2.  Work to decrease lorazepam by 1 tablet every 5 to 7 days.    3. Your goal is to get 1200 mg calcium daily for bone health. Your body can only absorb about 500 mg at one time. So a good guideline is to take a calcium supplement 500 mg twice daily and then get one serving of dairy or green leafy vegetables daily (each serving is about 300 mg calcium).    4. Your goal is to get 1000 international units vitamin D daily.    5.  We will discuss osteoporosis treatment further with Felisha Djeesus PA-C     6. Stop cod liver oil.        Follow-up: 8/7/23 at 10 AM with Felisha Dejesus PA-C.  8/28 at 11 AM with Caitlyn Jensen, pharmacist.    SUBJECTIVE/OBJECTIVE:                          Milly Weinberg is a 78 year old female coming in for an initial visit. She was referred to me from Felisha Dejesus PA-C .      Reason for visit: Medication Therapy Management - Anxiety.    Allergies/ADRs: Reviewed in chart  Past Medical History: Reviewed in chart  Tobacco: She reports that she has quit smoking. Her smoking use included cigarettes. She has never used smokeless tobacco.  Alcohol: not currently using  Caffeine: none (decaff)    Medication Adherence/Access: no issues reported    Anxiety/RLS/insomnia/memory loss:   Lorazepam 0.5 mg tablet twice daily as needed  Mirtazapine 30 mg tablet 1 tablet once daily  Escitalopram 10 mg -half tablet x1 week then increase to 1 tablet daily  Pramipexole 1 mg -2 tablets nightly    Patient has not started taking escitalopram after reading side effects.  Tried sertraline in the past and had side effects, this is concerning to her.  Finds that mirtazapine and lorazepam working well -has some anxiety today with visit.  Denies side effects.    Pain:   Acetaminophen 500 mg - 2 tabs twice daily    Works  well.  Denies side effects.    Hypertension/sinus tachycardia:   Atenolol 25 mg tablet 1 tablet daily.      Patient self-monitors blood pressure.  Home BP monitoring in range of 120s/60-80 mm Hg.    Patient reports no current medication side effects.    Date Blood Pressure   6/26/2023  15 2/80 ,134/72   4/11/2023  114/66   4/10/2023  106/60   11/14/2022  124/84     COPD:   Albuterol (ProAir/Ventolin/Proventil)    Side effects: none.    Patient reports the following symptoms: none.  Patient rarely uses albuterol inhaler.    Osteoporosis:   calcium 500 mg once daily  Patient is not experiencing side effects.  DEXA History: January 2022 -osteoporosis diagnosis  Patient is getting the following sources of dietary calcium: Dairy and green leafy vegetables  Risk factors: post-menopausal  Last vitamin D level:  Lab Results   Component Value Date    VITDT 62 10/26/2021       Dry Eye:   Restasis 0.05% emulsion 1 drop affected eye twice daily    Works well.  Able to afford.  Denies side effects.    Supplements:   Cod liver oil 1 teaspoon once daily  Flaxseed powder once daily  Multivitamin 1 tablet once daily    Works well.  Denies side effects.  Takes for general health.      Today's Vitals: BP (!) 142/82   Pulse 78   Wt 113 lb 3.2 oz (51.3 kg)   BMI 20.70 kg/m    ----------------      I spent 60 minutes with this patient today. All changes were made via collaborative practice agreement with Felisha Dejesus PA-C. A copy of the visit note was provided to the patient's provider(s).    A summary of these recommendations was sent via [employer].    Caitlyn Jensen, Pharm D., MPH  MTM Pharmacist - [facility]  Secure Voicemail: [phone]  In Office: Monday - Thursday           Medication Therapy Recommendations  Age-related osteoporosis without current pathological fracture    Current Medication: calcium carbonate 500 mg, elemental, (OSCAL 500) 1250 (500 Ca) MG TABS tablet   Rationale: Dose too low - Dosage too low -  Effectiveness   Recommendation: Increase Dose - Increase calcium to 1200 mg daily.  Start vitamin D 1000 units daily   Status: Patient Agreed - Adherence/Education          Rationale: Untreated condition - Needs additional medication therapy - Indication   Recommendation: Start Medication - Start bisphosphonate   Status: Declined per Patient         Generalized anxiety disorder    Current Medication: LORazepam (ATIVAN) 0.5 MG tablet   Rationale: Treating avoidable adverse medication reaction - Unnecessary medication therapy - Indication   Recommendation: Change Medication - Taper off lorazepam -start escitalopram   Status: Patient Agreed - Adherence/Education         Takes dietary supplements    Rationale: Treating avoidable adverse medication reaction - Unnecessary medication therapy - Indication   Recommendation: Discontinue Medication - Stop cod liver oil due to risk of inhalation -no known benefit   Status: Patient Agreed - Adherence/Education

## 2023-07-24 VITALS
BODY MASS INDEX: 20.7 KG/M2 | WEIGHT: 113.2 LBS | DIASTOLIC BLOOD PRESSURE: 82 MMHG | SYSTOLIC BLOOD PRESSURE: 142 MMHG | HEART RATE: 78 BPM

## 2023-07-24 RX ORDER — ESCITALOPRAM OXALATE 10 MG/1
10 TABLET ORAL DAILY
COMMUNITY
Start: 2023-06-26

## 2023-07-24 RX ORDER — IBUPROFEN 200 MG
1 CAPSULE ORAL 3 TIMES DAILY
COMMUNITY
End: 2023-09-11

## 2023-07-24 RX ORDER — VITAMIN B COMPLEX
1 TABLET ORAL DAILY
COMMUNITY

## 2023-07-24 RX ORDER — LORAZEPAM 0.5 MG/1
0.5 TABLET ORAL 2 TIMES DAILY PRN
COMMUNITY
Start: 2023-06-15 | End: 2023-11-19 | Stop reason: DRUGHIGH

## 2023-07-24 RX ORDER — FLAXSEED
1 POWDER (GRAM) ORAL
COMMUNITY

## 2023-07-24 NOTE — PATIENT INSTRUCTIONS
"Recommendations from MTM Pharmacist visit:                                                    MTM (medication therapy management) is a service provided by a clinical pharmacist designed to help you get the most of out of your medicines.  You may be sent a phone or email survey evaluating today's visit.  Please provide feedback you have for the service he received today if you are able.    1.  Start escitalopram 5 mg once daily for 1 week.  Then increase to escitalopram 10 mg daily.    2.  Work to decrease lorazepam by 1 tablet every 5 to 7 days.    3. Your goal is to get 1200 mg calcium daily for bone health. Your body can only absorb about 500 mg at one time. So a good guideline is to take a calcium supplement 500 mg twice daily and then get one serving of dairy or green leafy vegetables daily (each serving is about 300 mg calcium).    4. Your goal is to get 1000 international units vitamin D daily.    5.  We will discuss osteoporosis treatment further with Felisha Dejesus PA-C     6. Stop cod liver oil.        Follow-up: 8/7/23 at 10 AM with Felisha Dejesus PA-C.  8/28 at 11 AM with Caitlyn Jensen, pharmacist.      It was great speaking with you today.  I value your experience and would be very thankful for your time in providing feedback in our clinic survey. In the next few days, you may receive an email or text message from [employer] with a link to a survey related to your  clinical pharmacist.\"     To schedule another MTM appointment, please call the clinic directly or you may call the MTM scheduling line at [phone] or toll-free at [phone].     My Clinical Pharmacist's contact information:                                                      Please feel free to contact me with any questions or concerns you have.      Caitlyn Jensen, Pharm D., MPH  MTM Pharmacist - [facility]  Secure Voicemail: [phone]  Days in the office: Monday - Thursday          "

## 2023-08-03 ENCOUNTER — TRANSCRIBE ORDERS (OUTPATIENT)
Dept: OTHER | Age: 78
End: 2023-08-03

## 2023-08-03 DIAGNOSIS — I65.09 VERTEBRAL ARTERY STENOSIS: Primary | ICD-10-CM

## 2023-08-07 ENCOUNTER — MEDICAL CORRESPONDENCE (OUTPATIENT)
Dept: HEALTH INFORMATION MANAGEMENT | Facility: CLINIC | Age: 78
End: 2023-08-07
Payer: COMMERCIAL

## 2023-08-08 ENCOUNTER — TRANSCRIBE ORDERS (OUTPATIENT)
Dept: NEUROSURGERY | Facility: CLINIC | Age: 78
End: 2023-08-08
Payer: COMMERCIAL

## 2023-08-08 DIAGNOSIS — I65.09 OCCLUSION AND STENOSIS OF VERTEBRAL ARTERY: Primary | ICD-10-CM

## 2023-08-28 ENCOUNTER — OFFICE VISIT (OUTPATIENT)
Dept: PHARMACY | Facility: PHYSICIAN GROUP | Age: 78
End: 2023-08-28
Payer: COMMERCIAL

## 2023-08-28 VITALS
WEIGHT: 118.4 LBS | DIASTOLIC BLOOD PRESSURE: 68 MMHG | OXYGEN SATURATION: 96 % | BODY MASS INDEX: 21.66 KG/M2 | SYSTOLIC BLOOD PRESSURE: 128 MMHG | HEART RATE: 64 BPM

## 2023-08-28 DIAGNOSIS — M81.0 AGE-RELATED OSTEOPOROSIS WITHOUT CURRENT PATHOLOGICAL FRACTURE: ICD-10-CM

## 2023-08-28 DIAGNOSIS — G47.00 PERSISTENT INSOMNIA: ICD-10-CM

## 2023-08-28 DIAGNOSIS — F41.1 GENERALIZED ANXIETY DISORDER: Primary | ICD-10-CM

## 2023-08-28 DIAGNOSIS — I10 BENIGN ESSENTIAL HYPERTENSION: ICD-10-CM

## 2023-08-28 DIAGNOSIS — G25.81 RESTLESS LEGS SYNDROME (RLS): ICD-10-CM

## 2023-08-28 DIAGNOSIS — R41.3 MEMORY LOSS: ICD-10-CM

## 2023-08-28 DIAGNOSIS — R00.0 SINUS TACHYCARDIA: ICD-10-CM

## 2023-08-28 PROCEDURE — 99607 MTMS BY PHARM ADDL 15 MIN: CPT | Performed by: PHARMACIST

## 2023-08-28 PROCEDURE — 99606 MTMS BY PHARM EST 15 MIN: CPT | Performed by: PHARMACIST

## 2023-08-28 RX ORDER — CARBOXYMETHYLCELLULOSE SODIUM 5 MG/ML
1 SOLUTION/ DROPS OPHTHALMIC 3 TIMES DAILY
COMMUNITY

## 2023-08-28 RX ORDER — PROPRANOLOL HYDROCHLORIDE 20 MG/1
TABLET ORAL 2 TIMES DAILY
COMMUNITY

## 2023-08-28 ASSESSMENT — ANXIETY QUESTIONNAIRES
6. BECOMING EASILY ANNOYED OR IRRITABLE: NOT AT ALL
7. FEELING AFRAID AS IF SOMETHING AWFUL MIGHT HAPPEN: NEARLY EVERY DAY
1. FEELING NERVOUS, ANXIOUS, OR ON EDGE: NEARLY EVERY DAY
5. BEING SO RESTLESS THAT IT IS HARD TO SIT STILL: NOT AT ALL
GAD7 TOTAL SCORE: 12
GAD7 TOTAL SCORE: 12
2. NOT BEING ABLE TO STOP OR CONTROL WORRYING: NEARLY EVERY DAY
IF YOU CHECKED OFF ANY PROBLEMS ON THIS QUESTIONNAIRE, HOW DIFFICULT HAVE THESE PROBLEMS MADE IT FOR YOU TO DO YOUR WORK, TAKE CARE OF THINGS AT HOME, OR GET ALONG WITH OTHER PEOPLE: NOT DIFFICULT AT ALL
3. WORRYING TOO MUCH ABOUT DIFFERENT THINGS: NEARLY EVERY DAY

## 2023-08-28 ASSESSMENT — PATIENT HEALTH QUESTIONNAIRE - PHQ9
SUM OF ALL RESPONSES TO PHQ QUESTIONS 1-9: 0
5. POOR APPETITE OR OVEREATING: NOT AT ALL

## 2023-08-28 NOTE — PROGRESS NOTES
Medication Therapy Management (MTM) Encounter    ASSESSMENT:                            Medication Adherence/Access: No issues identified  -----------------------------------------  Anxiety/RLS/insomnia/memory loss: Depression well managed with low-dose escitalopram.  Emphasized risk of lorazepam on cognition and CNS depression. Recommend propranolol to replace atenolol to help reduce anxiety (more lipophilic - more effect on anxiety than atenolol).  Goal to use propranolol to reduce lorazepam.  Likely to decrease mirtazapine as higher doses more activating at bedtime at future visit to reduce need for lorazepam.    -----------------------------------------  Hypertension/sinus tachycardia: Blood pressure at goal less than 130/80 mmHg today, but not at goal at home.  Per home blood pressure readings, appears as though atenolol dose too high in the morning (hypotensive) and wanes during the day (elevated at night). Recommend replacing with propranolol twice daily for tachycardia and better blood pressure control (see above for added anxiety benefit). Recommend to continue to monitor blood pressure throughout the day.  -----------------------------------------  Osteoporosis: Patient not currently taking osteoporosis therapy.  Discussed bisphosphonates today.  Patient declined starting today.  Would like further discussion with care team as it was not clear if she had osteoporosis.  Unclear if Patient meeting goal calcium 1200 mg daily with calcium supplement and diet - recommend continue at least 1 serving dairy daily + 500 mg Ca twice daily.  Meeting NOF recommended 1000 IU vitamin D daily.  Due for vitamin D level at next visit with other labs.   -----------------------------------------    PLAN:                            1.  Stop atenolol 25 mg every morning.    2.  Start propranolol 10 mg twice daily - morning and evening. You may take your evening med earlier in the afternoon if you are feeling anxious. Work to  decrease lorazepam by 1 tablet every 5 to 7 days in the morning.    3. Your goal is to get 1200 mg calcium daily for bone health. Your body can only absorb about 500 mg at one time. So a good guideline is to take a calcium supplement 500 mg twice daily and then get one serving of dairy or green leafy vegetables daily (each serving is about 300 mg calcium). [facility]. If your insurance doesn't cover this, then have the pharmacist help you to find an equivalent over the counter formulation. This will replace your Algaecal.    4. Keep checking your blood pressure 2-3 times per week at different times of the day and bring to your next visit with Caitlyn, pharmacist.          Follow-up: 2-4 weeks with Caitlyn Medication Therapy Management Pharmacist at [facility]    SUBJECTIVE/OBJECTIVE:                          Milly Weinberg is a 78 year old female coming in for a follow up  visit. She was referred to me from Felisha Dejesus PA-C .  TOday's visit is a follow up from 7/17/23.    Reason for visit: Medication Therapy Management - Anxiety.    Allergies/ADRs: Reviewed in chart  Past Medical History: Reviewed in chart  Tobacco: She reports that she has quit smoking. Her smoking use included cigarettes. She has never used smokeless tobacco.  Alcohol: not currently using  Caffeine: none (decaf coffee only)    Medication Adherence/Access: no issues reported - takes meds twice daily and never misses a dose.  -----------------------------------------  .    Anxiety/RLS/insomnia/memory loss:   Lorazepam 0.5 mg tablet twice daily (morning as needed, every night at bedtime for sleep)  Mirtazapine 30 mg tablet 1 tablet once daily  Escitalopram 10 mg -1 tablet daily  Pramipexole 1 mg -2 tablets nightly    Escitalopram working well for depression (finding more hobbies and enjoys doing them again)- denies side effects. Also gaining weight as desired as has more of an appetite now that mental health improved. Tried  stopping lorazepam during the day, needing several days per week for anxiety due to significant environmental factors contributing to anxiety. Sleep is good - sleeps through until 6:30 am most mornings- this is normal wake up time for her. She is rested.       8/28/2023     1:53 PM   PHQ   PHQ-9 Total Score 0   Q9: Thoughts of better off dead/self-harm past 2 weeks Not at all         8/28/2023     1:53 PM   JAIME-7 SCORE   Total Score 12      -----------------------------------------  .      Hypertension/sinus tachycardia:   Atenolol 25 mg tablet 1 tablet daily in the morning     Patient self-monitors blood pressure.  Patient feeling some fatigue and dizziness in the morning. Goes away as day goes on.     Home BP:  8/14 - 136/68 (after supper)  8/17 - 113/68 (after supper)  8/21 - 142/81 (after supper)  8/26 - 150/80 (after supper)  8/28 - 71/98, 50/86 (am ~2 hours after taking atenolol)      Clinic BP:  BP Readings from Last 3 Encounters:   08/28/23 128/68   07/17/23 (!) 142/82   03/13/23 (!) 153/77      -----------------------------------------  .      Osteoporosis:   AlgeCal supplement 2 tabs twice daily - unknown how much Ca contains  Vitamin D 1000 international units daily    Patient is not experiencing side effects.  DEXA History: January 2022 -osteoporosis diagnosis  Patient is getting the following sources of dietary calcium: Dairy and green leafy vegetables  Risk factors: post-menopausal  Last vitamin D level:  Lab Results   Component Value Date    VITDT 62 10/26/2021     Today's Vitals: /68   Pulse 64   Wt 118 lb 6.4 oz (53.7 kg)   SpO2 96%   BMI 21.66 kg/m    ----------------      I spent 60 minutes with this patient today. All changes were made via collaborative practice agreement with Felisha Dejesus PA-C. A copy of the visit note was provided to the patient's provider(s).    A summary of these recommendations was sent via [employer].    Caitlyn Jensen, Pharm D., MPH  MTM Pharmacist - [facility]  Secure Voicemail: [phone]  In Office: Monday - Thursday           Medication Therapy Recommendations  Age-related osteoporosis without current pathological fracture    Current Medication: calcium carbonate 500 mg, elemental, (OSCAL 500) 1250 (500 Ca) MG TABS tablet   Rationale: Does not understand instructions - Adherence - Adherence   Recommendation: Provide Education - educate to start Ca to replace Algecal supplement   Status: Accepted per CPA         Benign essential hypertension    Current Medication: atenolol (TENORMIN) 25 MG tablet (Discontinued)   Rationale: More effective medication available - Ineffective medication - Effectiveness   Recommendation: Change Medication - change to propranolol bid for additional anxiety benefit   Status: Accepted per CPA

## 2023-08-28 NOTE — PATIENT INSTRUCTIONS
"Recommendations from MTM Pharmacist visit:                                                    MTM (medication therapy management) is a service provided by a clinical pharmacist designed to help you get the most of out of your medicines.  You may be sent a phone or email survey evaluating today's visit.  Please provide feedback you have for the service he received today if you are able.    1.  Stop atenolol 25 mg every morning.    2.  Start propranolol 10 mg twice daily - morning and evening. You may take your evening med earlier in the afternoon if you are feeling anxious. Work to decrease lorazepam by 1 tablet every 5 to 7 days in the morning.    3. Your goal is to get 1200 mg calcium daily for bone health. Your body can only absorb about 500 mg at one time. So a good guideline is to take a calcium supplement 500 mg twice daily and then get one serving of dairy or green leafy vegetables daily (each serving is about 300 mg calcium). [facility] If your insurance doesn't cover this, then have the pharmacist help you to find an equivalent over the counter formulation. This will replace your Algecal.    4. Keep checking your blood pressure 2-3 times per week at different times of the day and bring to your next visit with Caitlyn pharmacist.          Follow-up: 2-4 weeks with Caitlyn Medication Therapy Management Pharmacist at [facility]      It was great speaking with you today.  I value your experience and would be very thankful for your time in providing feedback in our clinic survey. In the next few days, you may receive an email or text message from "[employer] with a link to a survey related to your  clinical pharmacist.\"     To schedule another MTM appointment, please call the clinic directly at [phone]. You may also schedule with me at the clinic .     My Clinical Pharmacist's contact information:                                                      Please feel free to contact me " with any questions or concerns you have.      Caitlyn Jensen, Pharm D., MPH  MTM Pharmacist - [facility]  Secure Voicemail: [phone]  Days in the office: Monday - Thursday

## 2023-09-11 ENCOUNTER — OFFICE VISIT (OUTPATIENT)
Dept: PHARMACY | Facility: PHYSICIAN GROUP | Age: 78
End: 2023-09-11
Payer: COMMERCIAL

## 2023-09-11 VITALS
DIASTOLIC BLOOD PRESSURE: 60 MMHG | SYSTOLIC BLOOD PRESSURE: 138 MMHG | BODY MASS INDEX: 21.58 KG/M2 | WEIGHT: 118 LBS | HEART RATE: 72 BPM

## 2023-09-11 DIAGNOSIS — F41.1 GENERALIZED ANXIETY DISORDER: Primary | ICD-10-CM

## 2023-09-11 DIAGNOSIS — R00.0 SINUS TACHYCARDIA: ICD-10-CM

## 2023-09-11 DIAGNOSIS — M81.0 AGE-RELATED OSTEOPOROSIS WITHOUT CURRENT PATHOLOGICAL FRACTURE: ICD-10-CM

## 2023-09-11 DIAGNOSIS — I10 BENIGN ESSENTIAL HYPERTENSION: ICD-10-CM

## 2023-09-11 DIAGNOSIS — R41.3 MEMORY LOSS: ICD-10-CM

## 2023-09-11 DIAGNOSIS — G47.00 PERSISTENT INSOMNIA: ICD-10-CM

## 2023-09-11 DIAGNOSIS — G25.81 RESTLESS LEGS SYNDROME (RLS): ICD-10-CM

## 2023-09-11 PROCEDURE — 99606 MTMS BY PHARM EST 15 MIN: CPT | Performed by: PHARMACIST

## 2023-09-11 PROCEDURE — 99607 MTMS BY PHARM ADDL 15 MIN: CPT | Performed by: PHARMACIST

## 2023-09-11 RX ORDER — CALCIUM CARBONATE 500(1250)
1 TABLET ORAL 2 TIMES DAILY
COMMUNITY
Start: 2023-08-29

## 2023-09-11 NOTE — PROGRESS NOTES
Medication Therapy Management (MTM) Encounter    ASSESSMENT:                          -----------------------------------------  .    Medication Adherence/Access: helped patient to better understand new medication adjustments - will take our time with adjustments (see below for these changes).  -----------------------------------------  .  Anxiety/RLS/insomnia/memory loss: Depression well managed with low-dose escitalopram.  Emphasized risk of lorazepam on cognition and CNS depression. Recommend propranolol to replace atenolol to help reduce anxiety (more lipophilic - more effect on anxiety than atenolol).  Goal to use propranolol to reduce lorazepam.  Likely to decrease mirtazapine as higher doses more activating at bedtime at future visit to reduce need for lorazepam.    -----------------------------------------  .  Hypertension/sinus tachycardia: Blood pressure not at goal less than 130/80 mmHg - remains elevated at home.  Improves with time/relaxation - likely some anxiety contribution. Per home blood pressure readings, appears as though atenolol dose too high in the morning (hypotensive) and wanes during the day (elevated at night). Recommend replacing with propranolol twice daily for tachycardia and better blood pressure control (see above for added anxiety benefit). Recommend to continue to monitor blood pressure throughout the day.  -----------------------------------------  .  Osteoporosis: Not meeting goal of osteoporosis treatment. Patient has declined osteoporosis treatment in the past as  Would like further discussion with care team - did not discuss further today.   Patient meeting goal calcium 1200 mg daily with calcium supplement and diet - recommend continue at least 1 serving dairy daily + 500 mg Ca twice daily.  Meeting NOF recommended 1000 IU vitamin D daily.  Due for vitamin D level at next visit with other labs.   -----------------------------------------    PLAN:                            1.   Stop atenolol 25 mg every morning.    2.  Start propranolol 10 mg twice daily - 8 am and 8pm.     3. After 2 weeks, work to decrease your lorazepam. Take 1 tab in the morning and 1/2 tablet in the evening.     4. Keep checking your blood pressure 2-3 times per week at different times of the day and bring to your next visit with Caitlyn pharmacist.        Follow-up: 10/12 at 1:30 pm with Caitlyn Medication Therapy Management Pharmacist at [facility].    SUBJECTIVE/OBJECTIVE:                          Milly Weinberg is a 78 year old female coming in for a follow up  visit. TOday's visit is a follow up from 8/28/23.    Reason for visit: Medication Therapy Management - Anxiety.    Allergies/ADRs: Reviewed in chart  Past Medical History: Reviewed in chart  Tobacco: She reports that she has quit smoking. Her smoking use included cigarettes. She has never used smokeless tobacco.  Alcohol: not currently using  Caffeine: none (decaf coffee only)    Medication Adherence/Access: patient hasn't started meds from last visit yet - was confused after picked up meds from [facility].  -----------------------------------------  .    Anxiety/RLS/insomnia/memory loss:   Lorazepam 0.5 mg tablet twice daily (morning as needed, every night at bedtime for sleep)  Mirtazapine 30 mg tablet 1 tablet once daily  Escitalopram 10 mg -1 tablet daily  Pramipexole 1 mg -2 tablets nightly    Escitalopram working well for depression (finding more hobbies and enjoys doing them again)- denies side effects. Also gaining weight as desired as has more of an appetite now that mental health improved. Tried stopping lorazepam during the day, but finds anxiety especially heightened in the day time. Sleep is good - sleeps through until 6:30 am most mornings- this is normal wake up time for her. She is rested.       8/28/2023     1:53 PM   PHQ   PHQ-9 Total Score 0   Q9: Thoughts of better off dead/self-harm past 2 weeks Not at all         8/28/2023     1:53 PM   JAIME-7 SCORE    Total Score 12      -----------------------------------------  .      Hypertension/sinus tachycardia:   Atenolol 25 mg tablet 1 tablet daily in the morning     Has not started propranolol - has this at home, but did not understand how to start/stop atenolol and propranolol. Patient self-monitors blood pressure.  Patient feeling some fatigue and dizziness in the morning. Goes away as day goes on.     Home BP:  8/14 - 136/68 (after supper)  8/17 - 113/68 (after supper)  8/21 - 142/81 (after supper)  8/26 - 150/80 (after supper)  8/28 - 71/98, 50/86 (am ~2 hours after taking atenolol)  8/10 - 113/68 (morning)  8/17 - 183/80 (evening)  8/24 - 201/81 (evening)  8/30 - 163/92 (evening)  9/1 - 192/92 (evening)  9/10 - 164/81 (afternoon - staff [facility])      Clinic BP:  BP Readings from Last 3 Encounters:   09/11/23 138/60   08/28/23 128/68   07/17/23 (!) 142/82      -----------------------------------------  .      Osteoporosis:   Calcium 500 mg twice daily   Vitamin D 1000 international units daily    Patient has started these therapies after receiving from pharmacy . is not experiencing side effects.  DEXA History: January 2022 -osteoporosis diagnosis  Patient is getting the following sources of dietary calcium: Dairy and green leafy vegetables  Risk factors: post-menopausal  Last vitamin D level:  Lab Results   Component Value Date    VITDT 62 10/26/2021     Today's Vitals: /60   Pulse 72   Wt 118 lb (53.5 kg)   BMI 21.58 kg/m    ----------------      I spent 30 minutes with this patient today. All changes were made via collaborative practice agreement with Felisha Dejesus PA-C. A copy of the visit note was provided to the patient's provider(s).    A summary of these recommendations was sent via [employer].    Caitlyn Jensen, Pharm D., MPH  MTM Pharmacist - [facility]  Secure Voicemail: [phone]  In Office: Monday - Thursday           Medication Therapy Recommendations  Benign essential  hypertension    Current Medication: propranolol (INDERAL) 10 MG tablet   Rationale: Does not understand instructions - Adherence - Adherence   Recommendation: Provide Education - education provided on safe propranolol dosing to replace atenolol   Status: Patient Agreed - Adherence/Education

## 2023-09-11 NOTE — PATIENT INSTRUCTIONS
"Recommendations from MTM Pharmacist visit:                                                    MTM (medication therapy management) is a service provided by a clinical pharmacist designed to help you get the most of out of your medicines.  You may be sent a phone or email survey evaluating today's visit.  Please provide feedback you have for the service he received today if you are able.      1.  Stop atenolol 25 mg every morning.    2.  Start propranolol 10 mg twice daily - 8 am and 8pm.     3. After 2 weeks, work to decrease your lorazepam. Take 1 tab in the morning and 1/2 tablet in the evening.     4. Keep checking your blood pressure 2-3 times per week at different times of the day and bring to your next visit with Caitlyn pharmacist.        Follow-up: 10/12 at 1:30 pm with Caitlyn Medication Therapy Management Pharmacist at [facility].      It was great speaking with you today.  I value your experience and would be very thankful for your time in providing feedback in our clinic survey. In the next few days, you may receive an email or text message from [employer] with a link to a survey related to your  clinical pharmacist.\"     To schedule another MTM appointment, please call the clinic directly at [phone]. You may also schedule with me at the clinic .     My Clinical Pharmacist's contact information:                                                      Please feel free to contact me with any questions or concerns you have.      Caitlyn Jensen, Pharm D., MPH  MTM Pharmacist - [facility]  Secure Voicemail: [phone]  Days in the office: Monday - Thursday          "

## 2023-10-12 ENCOUNTER — OFFICE VISIT (OUTPATIENT)
Dept: PHARMACY | Facility: PHYSICIAN GROUP | Age: 78
End: 2023-10-12
Payer: COMMERCIAL

## 2023-10-12 VITALS
WEIGHT: 118.2 LBS | DIASTOLIC BLOOD PRESSURE: 60 MMHG | HEART RATE: 91 BPM | SYSTOLIC BLOOD PRESSURE: 132 MMHG | BODY MASS INDEX: 21.62 KG/M2

## 2023-10-12 DIAGNOSIS — I10 BENIGN ESSENTIAL HYPERTENSION: ICD-10-CM

## 2023-10-12 DIAGNOSIS — M81.0 AGE-RELATED OSTEOPOROSIS WITHOUT CURRENT PATHOLOGICAL FRACTURE: ICD-10-CM

## 2023-10-12 DIAGNOSIS — G25.81 RESTLESS LEGS SYNDROME (RLS): ICD-10-CM

## 2023-10-12 DIAGNOSIS — F41.1 GENERALIZED ANXIETY DISORDER: Primary | ICD-10-CM

## 2023-10-12 DIAGNOSIS — G47.00 PERSISTENT INSOMNIA: ICD-10-CM

## 2023-10-12 DIAGNOSIS — R41.3 MEMORY LOSS: ICD-10-CM

## 2023-10-12 DIAGNOSIS — R00.0 SINUS TACHYCARDIA: ICD-10-CM

## 2023-10-12 PROCEDURE — 99607 MTMS BY PHARM ADDL 15 MIN: CPT | Performed by: PHARMACIST

## 2023-10-12 PROCEDURE — 99606 MTMS BY PHARM EST 15 MIN: CPT | Performed by: PHARMACIST

## 2023-10-12 NOTE — PATIENT INSTRUCTIONS
"Recommendations from MTM Pharmacist visit:                                                    MTM (medication therapy management) is a service provided by a clinical pharmacist designed to help you get the most of out of your medicines.  You may be sent a phone or email survey evaluating today's visit.  Please provide feedback you have for the service he received today if you are able.      1.  Increase propranolol to 20 mg twice daily - 8 am and 8 pm. You may use up the 10 mg tablets you have at home by taking 2 x 10 mg tablets twice daily. When you get the new prescription from [facility], that will be propranolol 20 mg tabs - so you can just take 1 tab twice daily when you  the new prescription.    2.  Like Mikala Woodruff recommended, work to decrease your lorazepam. Take 1 tab in the morning and 1/2 tablet in the evening.     3. Keep checking your blood pressure 2-3 times per week at different times of the day and bring to your next visit with Caitlyn pharmacist.      Follow-up: 4 weeks with Caitlyn Medication Therapy Management Pharmacist at [facility] for blood pressure follow up.      It was great speaking with you today.  I value your experience and would be very thankful for your time in providing feedback in our clinic survey. In the next few days, you may receive an email or text message from [employer] with a link to a survey related to your  clinical pharmacist.\"     To schedule another MTM appointment, please call the clinic directly at [phone]. You may also schedule with me at the clinic .     My Clinical Pharmacist's contact information:                                                      Please feel free to contact me with any questions or concerns you have.      Caitlyn Jensen, Pharm D., MPH  MTM Pharmacist - [facility]  Secure Voicemail: [phone]  Days in the office: Monday - Thursday         "

## 2023-10-12 NOTE — PROGRESS NOTES
Medication Therapy Management (MTM) Encounter    ASSESSMENT:                          -----------------------------------------  .    Medication Adherence/Access: no issues reported.  -----------------------------------------  .    URI: As has been >1week with symptoms and not improving, recommend after hours or soon visit with PCP to evaluate. Wait to get flu shot until feeling better for optimal immune response and to reduce risk of immune system overreaction.    -----------------------------------------  .      Anxiety/RLS/insomnia/memory loss: Depression well managed with low-dose escitalopram.  Emphasized risk of lorazepam on cognition and CNS depression.  Goal to use propranolol to reduce lorazepam - some improvement in anxiety noted, recommend increasing propranolol today (see below).  Will send recommendation to psychiatry to decrease mirtazapine as higher doses more activating at bedtime (lower doses facilitate sleep - 7.5 - 15 mg typical for sleep improvement) at future visit to reduce need for lorazepam.    -----------------------------------------  .  Hypertension/sinus tachycardia: Blood pressure not at goal less than 130/80 mmHg - remains elevated at home.  Improves with time/relaxation - likely some anxiety contribution. Recommend increasing/optimizing propranolol (see above for added anxiety benefit). Recommend to continue to monitor blood pressure throughout the day.  -----------------------------------------  .  Osteoporosis: Not meeting goal of osteoporosis treatment. Patient has declined osteoporosis treatment in the past as  Would like further discussion with care team - did not discuss further today.   Patient meeting goal calcium 1200 mg daily with calcium supplement and diet - recommend continue at least 1 serving dairy daily + 500 mg Ca twice daily.  Meeting NOF recommended 1000 IU vitamin D daily.  Due for vitamin D level at next visit with other labs.    -----------------------------------------    PLAN:                            1.  Increase propranolol to 20 mg twice daily - 8 am and 8 pm. You may use up the 10 mg tablets you have at home by taking 2 x 10 mg tablets twice daily. When you get the new prescription from [facility], that will be propranolol 20 mg tabs - so you can just take 1 tab twice daily when you  the new prescription.    2.  Like Maria Antonia FREY, CNS recommended, work to decrease your lorazepam. Take 1 tab in the morning and 1/2 tablet in the evening. Caitlyn Medication Therapy Management Pharmacist will contact Maria Antonia's Office to help support goal to reduce lorazepam by reducing mirtazapine as well (lower doses of mirtazapine help reduce activation and help you to sleep).    3. Keep checking your blood pressure 2-3 times per week at different times of the day and bring to your next visit with Caitlyn pharmacist.      Follow-up: 4 weeks with Caitlyn Medication Therapy Management Pharmacist at [facility] for blood pressure follow up.    SUBJECTIVE/OBJECTIVE:                          Milly Weinberg is a 78 year old female coming in for a follow up  visit. Today's visit is a follow up from 9/11/23.    Reason for visit: Medication Therapy Management - Anxiety.    Allergies/ADRs: Reviewed in chart  Past Medical History: Reviewed in chart  Tobacco: She reports that she has quit smoking. Her smoking use included cigarettes. She has never used smokeless tobacco.  Alcohol: not currently using  Caffeine: none (decaf coffee only)    Medication Adherence/Access: patient hasn't started meds from last visit yet - was confused after picked up meds from [facility].  -----------------------------------------  .  URI:  Patient has been having URI symptoms for over 1 week. Still has cough. No fever, aches, etc. Currently. Wonders if she can get a flu shot?    -----------------------------------------  .    Anxiety/RLS/insomnia/memory loss:   Lorazepam 0.5 mg tablet twice  daily (starting this weekend - whole tab in the morning,  1/2 tablet every night at bedtime for sleep)  Mirtazapine 30 mg tablet 1 tablet once daily  Escitalopram 10 mg -1 tablet daily  Pramipexole 1 mg -2 tablets nightly    Following with Psychiatry -Maria Antonia FREY, CNS at [facility]. Agreeable to start to taper lorazepam, partially secondary to shortage of lorazepam. Finding anxiety somewhat better managed with starting propranolol as well- feels supported to taper off lorazepam slowly in this way. Escitalopram working well for depression (finding more hobbies and enjoys doing them again)- denies side effects. Also gaining weight as desired as has more of an appetite now that mental health improved. Tried stopping lorazepam during the day, but finds anxiety especially heightened in the day time. Sleep is good - sleeps through until 6:30 am most mornings- this is normal wake up time for her. She is rested.         8/28/2023     1:53 PM   PHQ   PHQ-9 Total Score 0   Q9: Thoughts of better off dead/self-harm past 2 weeks Not at all         8/28/2023     1:53 PM   JAIME-7 SCORE   Total Score 12      -----------------------------------------  .      Hypertension/sinus tachycardia:   Propranolol 10 mg twice daily     Denies side effects. Patient self-monitors blood pressure.  Doesn't feel this is high enough dose for blood pressure, though anxiety has improved.    Home BP:    9/5 - 135/75  9/8 - 167/80  9/13 - 174/97  9/19 - 195/75  9/29 - 182/95  10/3 - 180/75  10/8- 182/95      Clinic BP:  BP Readings from Last 3 Encounters:   10/12/23 132/60   09/11/23 138/60   08/28/23 128/68      -----------------------------------------  .      Osteoporosis:   Calcium 500 mg twice daily   Vitamin D 1000 international units daily    Patient has started these therapies after receiving from pharmacy . is not experiencing side effects.  DEXA History: January 2022 -osteoporosis diagnosis  Patient is getting the following sources of  dietary calcium: Dairy and green leafy vegetables  Risk factors: post-menopausal  Last vitamin D level:  Lab Results   Component Value Date    VITDT 62 10/26/2021     Today's Vitals: /60   Pulse 91   Wt 118 lb 3.2 oz (53.6 kg)   BMI 21.62 kg/m    ----------------      I spent 20 minutes with this patient today. All changes were made via collaborative practice agreement with Felisha Dejesus PA-C. A copy of the visit note was provided to the patient's provider(s).    A summary of these recommendations was given to the patient today.    Caitlyn Jensen, Pharm D., MPH  MTM Pharmacist - [facility]  Secure Voicemail: [phone]  In Office: Monday - Thursday           Medication Therapy Recommendations  Benign essential hypertension    Current Medication: propranolol (INDERAL) 20 MG tablet   Rationale: Dose too low - Dosage too low - Effectiveness   Recommendation: Increase Dose   Status: Accepted per CPA             We sent a prescription for a continuous glucose monitor (Freestyle Lia) to [employer]. This is a mail order diabetes supply company based out of [place]. They will likely be calling you to coordinate delivery from a [place] phone number, for your reference. If you miss their call or haven't heard from them in a day or two, you may call them at [phone] to check on the status of your order.

## 2023-11-09 ENCOUNTER — OFFICE VISIT (OUTPATIENT)
Dept: PHARMACY | Facility: PHYSICIAN GROUP | Age: 78
End: 2023-11-09
Payer: COMMERCIAL

## 2023-11-09 VITALS
SYSTOLIC BLOOD PRESSURE: 136 MMHG | WEIGHT: 118.8 LBS | DIASTOLIC BLOOD PRESSURE: 60 MMHG | HEART RATE: 77 BPM | BODY MASS INDEX: 21.73 KG/M2

## 2023-11-09 DIAGNOSIS — G47.00 PERSISTENT INSOMNIA: ICD-10-CM

## 2023-11-09 DIAGNOSIS — I10 BENIGN ESSENTIAL HYPERTENSION: ICD-10-CM

## 2023-11-09 DIAGNOSIS — R00.0 SINUS TACHYCARDIA: ICD-10-CM

## 2023-11-09 DIAGNOSIS — M81.0 AGE-RELATED OSTEOPOROSIS WITHOUT CURRENT PATHOLOGICAL FRACTURE: ICD-10-CM

## 2023-11-09 DIAGNOSIS — F41.1 GENERALIZED ANXIETY DISORDER: Primary | ICD-10-CM

## 2023-11-09 DIAGNOSIS — G25.81 RESTLESS LEGS SYNDROME (RLS): ICD-10-CM

## 2023-11-09 PROCEDURE — 99607 MTMS BY PHARM ADDL 15 MIN: CPT | Performed by: PHARMACIST

## 2023-11-09 PROCEDURE — 99606 MTMS BY PHARM EST 15 MIN: CPT | Performed by: PHARMACIST

## 2023-11-09 NOTE — PATIENT INSTRUCTIONS
"Recommendations from MTM Pharmacist visit:                                                    MTM (medication therapy management) is a service provided by a clinical pharmacist designed to help you get the most of out of your medicines.  You may be sent a phone or email survey evaluating today's visit.  Please provide feedback you have for the service he received today if you are able.    Increase propranolol. Continue 1 tablet (20 mg) in the morning and increase to 2 tablets (40 mg) in the evening after supper.  2.  Continue to measure blood pressure daily - vary the times of day you test it. Bring your recordings to visit on Monday with Caitlyn Jensen RPH  3. Caitlyn will talk with Maria Antonia (therapist) about how we can safely take your anxiety meds and report back changes on Monday.    Follow-up: Monday, 11/13 at 3:30 pm with Caitlyn Jensen RPH at [facility]       It was great speaking with you today.  I value your experience and would be very thankful for your time in providing feedback in our clinic survey. In the next few days, you may receive an email or text message from [employer] with a link to a survey related to your  clinical pharmacist.\"     To schedule another MTM appointment, please call the clinic directly at [phone]. You may also schedule with me at the clinic .     My Clinical Pharmacist's contact information:                                                      Please feel free to contact me with any questions or concerns you have.      Caitlyn Jensen, Pharm D., MPH  MTM Pharmacist - [facility]  Secure Voicemail: [phone]  Days in the office: Monday - Thursday         "

## 2023-11-09 NOTE — PROGRESS NOTES
Medication Therapy Management (MTM) Encounter    ASSESSMENT:                          -----------------------------------------  .    Medication Adherence/Access: See below for considerations.  -----------------------------------------  .      Anxiety/RLS/insomnia/memory loss: Depression well managed with low-dose escitalopram. Due to difficulty cutting small lorazepam tabs and goal to reduce lorazepam, will discuss alternatives such as liquid or further reducing lorazepam to 0.5 mg daily. Emphasized risk of lorazepam on cognition and CNS depression.  May use propranolol to reduce lorazepam - some improvement in anxiety noted, recommend increasing propranolol today (see below).  Additionally,  mirtazapine at higher doses is more activating at bedtime (lower doses facilitate sleep - 7.5 - 15 mg typical for sleep improvement) to further reduce need for lorazepam. Defer to psychiatry, Medication Therapy Management to discuss with them further.    -----------------------------------------  .  Hypertension/sinus tachycardia: Blood pressure not at goal less than 130/80 mmHg - remains elevated at home, especially in the evening.  Improves with time/relaxation - likely some anxiety contribution. Recommend increasing/optimizing propranolol with additional afternoon dose. Recommend to continue to monitor blood pressure throughout the day.  -----------------------------------------  .  Osteoporosis: Not meeting goal of osteoporosis treatment. Patient has declined osteoporosis treatment in the past as  Would like further discussion with care team - did not discuss further today.   Patient meeting goal calcium 1200 mg daily with calcium supplement and diet - recommend continue at least 1 serving dairy daily + 500 mg Ca twice daily.  Meeting NOF recommended 1000 IU vitamin D daily.  Due for vitamin D level at next visit with other labs.   -----------------------------------------    PLAN:                            Increase  propranolol. Continue 1 tablet (20 mg) in the morning and increase to 2 tablets (40 mg) in the evening after supper.  2.  Continue to measure blood pressure daily - vary the times of day you test it. Bring your recordings to visit on Monday with Caitlyn Jensen RPH  3. Caitlyn will talk with Maria Antonia (therapist) about how we can safely take your anxiety meds and report back changes on Monday.    Follow-up: Monday, 11/13 at 3:30 pm with Caitlyn Jensen RPH at [facility]     11/13/23 - called to discuss blood pressure values and psychiatry recommendations. Psych prefers to manage anxiety solely - will monitor lorazepam use/shortage. Blood pressure has improved in the evening with readings <140/90 mmHg.    SUBJECTIVE/OBJECTIVE:                          Milly Weinberg is a 78 year old female coming in for a follow up  visit. Today's visit is a follow up from 10/12/23.    Reason for visit: Medication Therapy Management - blood pressure     Allergies/ADRs: Reviewed in chart  Past Medical History: Reviewed in chart  Tobacco: She reports that she has quit smoking. Her smoking use included cigarettes. She has never used smokeless tobacco.  Alcohol: not currently using  Caffeine: none (decaf coffee only)    Medication Adherence/Access: difficulty finding lorazepam 0.5 mg tabs in stock. Working with Psych NP to find alternative dosing using 1mg tablet. Patient has concerns as 1mg tab is very small and difficult to cut (see below).    -----------------------------------------  .    Anxiety/RLS/insomnia/memory loss:   Lorazepam  1 mg tablet twice daily ( 1/2 tab in the morning,  1/4 tablet every night at bedtime for sleep)  Mirtazapine  30 mg tablet 1 tablet once daily  Escitalopram 10 mg -1 tablet daily  Pramipexole 1 mg -2 tablets nightly    Following with Psychiatry -Maria Antonia FREY, CNS at [facility].  Agreeable to start to taper lorazepam, partially secondary to shortage of lorazepam. Finding anxiety somewhat better managed  with starting propranolol as well- feels supported to taper off lorazepam slowly in this way. Escitalopram working well for depression (finding more hobbies and enjoys doing them again)- denies side effects. Also gaining weight as desired as has more of an appetite now that mental health improved. Tried stopping lorazepam during the day, but finds anxiety especially heightened in the day time. Sleep is good - sleeps through until 6:30 am most mornings- this is normal wake up time for her. She is rested.         8/28/2023     1:53 PM   PHQ   PHQ-9 Total Score 0   Q9: Thoughts of better off dead/self-harm past 2 weeks Not at all         8/28/2023     1:53 PM   JAIME-7 SCORE   Total Score 12      -----------------------------------------  .      Hypertension/sinus tachycardia:   Propranolol 20 mg twice daily (8 am and 6pm)    Denies side effects. Patient self-monitors blood pressure.  Feels that blood pressure somewhat more often elevated in the evening. Doesn't feel this is high enough dose for blood pressure, though anxiety has improved.    Home BP:    10/30 - 137/67 mmHg  11/1 - 136/71  11/3 - 172/96  11/5 - 149/86  11/7 - 168/95      Clinic BP:  BP Readings from Last 3 Encounters:   11/09/23 136/60   10/12/23 132/60   09/11/23 138/60      -----------------------------------------  .      Osteoporosis:   Calcium 500 mg twice daily   Vitamin D 1000 international units daily    Patient has started these therapies after receiving from pharmacy . is not experiencing side effects.  DEXA History: January 2022 -osteoporosis diagnosis  Patient is getting the following sources of dietary calcium: Dairy and green leafy vegetables  Risk factors: post-menopausal  Last vitamin D level:  Lab Results   Component Value Date    VITDT 62 10/26/2021     Today's Vitals: /60   Pulse 77   Wt 118 lb 12.8 oz (53.9 kg)   BMI 21.73 kg/m    ----------------      I spent 20 minutes with this patient today. All changes were made via  collaborative practice agreement with Felisha Dejesus PA-C. A copy of the visit note was provided to the patient's provider(s).    A summary of these recommendations was given to the patient today.    Caitlyn Jensen, Pharm D., MPH  MTM Pharmacist - [facility]  Secure Voicemail: [phone]  In Office: Monday - Thursday           Medication Therapy Recommendations  Benign essential hypertension    Current Medication: propranolol (INDERAL) 20 MG tablet   Rationale: Dose too low - Dosage too low - Effectiveness   Recommendation: Increase Dose   Status: Accepted per CPA         Generalized anxiety disorder    Current Medication: LORazepam (ATIVAN) 1 MG tablet   Rationale: Treating avoidable adverse medication reaction - Unnecessary medication therapy - Indication   Recommendation: Change Medication   Status: Declined per Provider               We sent a prescription for a continuous glucose monitor (Freestyle Lia) to [employer]. This is a mail order diabetes supply company based out of [place]. They will likely be calling you to coordinate delivery from a [place] phone number, for your reference. If you miss their call or haven't heard from them in a day or two, you may call them at [phone] to check on the status of your order.

## 2023-11-15 ENCOUNTER — HOSPITAL ENCOUNTER (OUTPATIENT)
Dept: MAMMOGRAPHY | Facility: CLINIC | Age: 78
Discharge: HOME | End: 2023-11-15
Attending: PHYSICIAN ASSISTANT | Admitting: PHYSICIAN ASSISTANT
Payer: COMMERCIAL

## 2023-11-15 DIAGNOSIS — Z12.31 SCREENING MAMMOGRAM, ENCOUNTER FOR: ICD-10-CM

## 2023-11-15 PROCEDURE — 77067 SCR MAMMO BI INCL CAD: CPT

## 2023-11-19 RX ORDER — LORAZEPAM 1 MG/1
1 TABLET ORAL 2 TIMES DAILY
COMMUNITY
End: 2024-01-18

## 2023-11-27 ENCOUNTER — HOSPITAL ENCOUNTER (OUTPATIENT)
Dept: MAMMOGRAPHY | Facility: CLINIC | Age: 78
Discharge: HOME | End: 2023-11-27
Attending: PHYSICIAN ASSISTANT | Admitting: PHYSICIAN ASSISTANT
Payer: COMMERCIAL

## 2023-11-27 DIAGNOSIS — N64.89 BREAST ASYMMETRY: ICD-10-CM

## 2023-11-27 PROCEDURE — 77061 BREAST TOMOSYNTHESIS UNI: CPT

## 2023-12-27 ENCOUNTER — TRANSFERRED RECORDS (OUTPATIENT)
Dept: HEALTH INFORMATION MANAGEMENT | Facility: CLINIC | Age: 78
End: 2023-12-27

## 2023-12-27 ENCOUNTER — LAB REQUISITION (OUTPATIENT)
Dept: LAB | Facility: CLINIC | Age: 78
End: 2023-12-27

## 2023-12-27 DIAGNOSIS — I65.02 OCCLUSION AND STENOSIS OF LEFT VERTEBRAL ARTERY: ICD-10-CM

## 2023-12-27 LAB
CHOLEST SERPL-MCNC: 169 MG/DL
FASTING STATUS PATIENT QL REPORTED: NORMAL
HDLC SERPL-MCNC: 62 MG/DL
LDLC SERPL CALC-MCNC: 91 MG/DL
NONHDLC SERPL-MCNC: 107 MG/DL
TRIGL SERPL-MCNC: 82 MG/DL

## 2023-12-27 PROCEDURE — 80061 LIPID PANEL: CPT | Performed by: PHYSICIAN ASSISTANT

## 2024-01-18 ENCOUNTER — OFFICE VISIT (OUTPATIENT)
Dept: PHARMACY | Facility: PHYSICIAN GROUP | Age: 79
End: 2024-01-18
Payer: COMMERCIAL

## 2024-01-18 VITALS
BODY MASS INDEX: 23.08 KG/M2 | HEART RATE: 78 BPM | DIASTOLIC BLOOD PRESSURE: 78 MMHG | SYSTOLIC BLOOD PRESSURE: 126 MMHG | WEIGHT: 126.2 LBS

## 2024-01-18 DIAGNOSIS — G25.81 RESTLESS LEGS SYNDROME (RLS): ICD-10-CM

## 2024-01-18 DIAGNOSIS — R52 PAIN: ICD-10-CM

## 2024-01-18 DIAGNOSIS — M81.0 AGE-RELATED OSTEOPOROSIS WITHOUT CURRENT PATHOLOGICAL FRACTURE: ICD-10-CM

## 2024-01-18 DIAGNOSIS — R00.0 SINUS TACHYCARDIA: ICD-10-CM

## 2024-01-18 DIAGNOSIS — J44.9 CHRONIC OBSTRUCTIVE PULMONARY DISEASE, UNSPECIFIED COPD TYPE (H): ICD-10-CM

## 2024-01-18 DIAGNOSIS — F41.1 GENERALIZED ANXIETY DISORDER: Primary | ICD-10-CM

## 2024-01-18 DIAGNOSIS — G47.00 PERSISTENT INSOMNIA: ICD-10-CM

## 2024-01-18 DIAGNOSIS — H04.123 DRY EYES: ICD-10-CM

## 2024-01-18 DIAGNOSIS — Z78.9 TAKES DIETARY SUPPLEMENTS: ICD-10-CM

## 2024-01-18 DIAGNOSIS — I10 BENIGN ESSENTIAL HYPERTENSION: ICD-10-CM

## 2024-01-18 DIAGNOSIS — R41.3 MEMORY LOSS: ICD-10-CM

## 2024-01-18 PROCEDURE — 99607 MTMS BY PHARM ADDL 15 MIN: CPT | Performed by: PHARMACIST

## 2024-01-18 PROCEDURE — 99605 MTMS BY PHARM NP 15 MIN: CPT | Performed by: PHARMACIST

## 2024-01-18 RX ORDER — LORAZEPAM 0.5 MG/1
1 TABLET ORAL 2 TIMES DAILY
COMMUNITY
Start: 2024-01-02

## 2024-01-18 NOTE — PATIENT INSTRUCTIONS
"Recommendations from MTM Pharmacist visit:                                                    MTM (medication therapy management) is a service provided by a clinical pharmacist designed to help you get the most of out of your medicines.  You may be sent a phone or email survey evaluating today's visit.  Please provide feedback you have for the service he received today if you are able.    Consider Alendronate (tabs once weekly), Reclast (infusion once per year), or Prolia (Injection every 6 months in clinic) for osteoporosis. I've included some information for you to consider before your upcoming bone scan (Dexa) to make this treatment decision. Contact your dentist for an appointment prior to starting these meds as there can be changes in your jaw bone that it may be beneficial to have any dental work done prior to starting these meds.    IF you start to notice an increase in your use of albuterol, it may be related to cold, dry air. Try using a humidifier in your home (bedroom may be best while you sleep) to help reduce your cough and need for albuterol.      Follow up: 3/28/24 at 10:30 a.m. with Dr. Lay. In about 6 months with Medication Therapy Management Pharmacist    It was great speaking with you today.  I value your experience and would be very thankful for your time in providing feedback in our clinic survey. In the next few days, you may receive an email or text message from [employer] with a link to a survey related to your  clinical pharmacist.\"     To schedule another MTM appointment, please call the clinic directly at [phone]. You may also schedule with me at the clinic .     My Clinical Pharmacist's contact information:                                                      Please feel free to contact me with any questions or concerns you have.      Caitlyn Jensen, Pharm D., MPH  MTM Pharmacist - [facility]  Secure Voicemail: [phone]  Days in the office: Monday - " Thursday

## 2024-01-18 NOTE — LETTER
"Recommended To-Do List      Prepared on: 01/18/2024       You can get the best results from your medications by completing the items on this \"To-Do List.\"      Bring your To-Do List when you go to your doctor. And, share it with your family or caregivers.    My To-Do List:  What we talked about: What I should do:   A new medication that may be of benefit to you    Start taking Consider Alendronate (tabs once weekly), Reclast (infusion once per year), or Prolia (Injection every 6 months in clinic) for osteoporosis. I've included some information for you to consider before your upcoming bone scan (Dexa) to make this treatment decision. Contact your dentist for an appointment prior to starting these meds as there can be changes in your jaw bone that it may be beneficial to have any dental work done prior to starting these meds.          What we talked about: What I should do:                     "

## 2024-01-18 NOTE — LETTER
_  Medication List        Prepared on: 01/18/2024     Bring your Medication List when you go to the doctor, hospital, or   emergency room. And, share it with your family or caregivers.     Note any changes to how you take your medications.  Cross out medications when you no longer use them.    Medication How I take it Why I use it Prescriber   acetaminophen (TYLENOL) 325 MG tablet Take 650 mg by mouth every 6 hours as needed for mild pain  pain Patient Reported   albuterol (PROAIR HFA/PROVENTIL HFA/VENTOLIN HFA) 108 (90 Base) MCG/ACT inhaler Inhale 1-2 puffs into the lungs every 4 hours as needed COPD Greer Lay MD    calcium carbonate 500 mg, elemental, (OSCAL) 500 MG tablet Take 1 tablet by mouth 2 times daily  osteoporosis Greer Lay MD    carboxymethylcellulose PF (REFRESH PLUS) 0.5 % ophthalmic solution 1 drop 3 times daily  Dry eye Greer Lay MD    cycloSPORINE (RESTASIS) 0.05 % ophthalmic emulsion Place 1 drop into both eyes every 12 hours Drying and Inflammation of Cornea and Conjunctiva of Eyes      escitalopram (LEXAPRO) 10 MG tablet Take 10 mg by mouth daily  Anxiety and Depression Maria Antonia FREY, CNS   Flaxseed, Linseed, (FLAX SEEDS) POWD Take 1 teaspoonful by mouth daily with food  General Health Patient Reported   LORazepam (ATIVAN) 0.5 MG tablet Take 1 tablet by mouth 2 times daily Anxiety and sleep Maria Antonia FREY, CNS   mirtazapine (REMERON) 30 MG tablet Take 30 mg by mouth At Bedtime  Sleep Maria Antonia FREY, CNS   pramipexole (MIRAPEX) 1 MG tablet Take 2 mg by mouth At Bedtime  Restless Legs Greer Lay MD    propranolol (INDERAL) 20 MG tablet Take by mouth 2 times daily 1 tablet in the morning and 2 tablets with supper  Blood pressure and anxiety Greer Lay MD    Vitamin D3 (VITAMIN D, CHOLECALCIFEROL,) 25 mcg (1000 units) tablet Take 1 tablet by mouth daily  osteoporosis Patient Reported         Add new medications, over-the-counter drugs,  herbals, vitamins, or  minerals in the blank rows below.    Medication How I take it Why I use it Prescriber                                      Allergies:      penicillins; cephalexin; doxycycline; sulfa antibiotics; sulfatolamide        Side effects I have had:               Other Information:              My notes and questions:

## 2024-01-18 NOTE — LETTER
January 18, 2024  Sharon Naik  [address]  [address] [place] [address]    Dear Ms. Lenard, [facility]     Thank you for talking with me on Jan 18, 2024 about your health and medications. As a follow-up to our conversation, I have included two documents:      Your Recommended To-Do List has steps you should take to get the best results from your medications.  Your Medication List will help you keep track of your medications and how to take them.    If you want to talk about these documents, please call Caitlyn Jensen RPH at phone: [phone], Monday-Friday 8-4:30pm.    I look forward to working with you and your doctors to make sure your medications work well for you.    Sincerely,  Caitlyn Jensen RPH  [facility] Pharmacist, [facility]   <<----- Click to add NO significant Past Surgical History

## 2024-01-18 NOTE — PROGRESS NOTES
Medication Therapy Management (MTM) Encounter    ASSESSMENT:                          -----------------------------------------  .    Medication Adherence/Access: See below for considerations.  -----------------------------------------  .      Anxiety/RLS/insomnia/memory loss: Depression well managed with low-dose escitalopram.  Emphasized risk of lorazepam on cognition and CNS depression - will continue to discuss with Psych NP.  May use propranolol to reduce lorazepam - some improvement in anxiety noted. Additionally,  mirtazapine at higher doses is more activating at bedtime (lower doses facilitate sleep - 7.5 - 15 mg typical for sleep improvement) to further reduce need for lorazepam. Defer to psychiatry, Medication Therapy Management has discussed with psych in the past with some resistance from Psych - patient will discuss further.    -----------------------------------------  .  Hypertension/sinus tachycardia: Blood pressure at goal less than 130/80 mmHg - clinic and home readings. Recommend to continue to monitor blood pressure throughout the day.  -----------------------------------------  .  Osteoporosis: Not meeting goal of osteoporosis treatment. Patient has declined osteoporosis treatment in the past. After discussion today of risk/benefit, patient open to treatments. Recommend to re-check labs for renal function and electrolytes prior to initiation of treatment. Recommend Reclast for conveinence, though may have more risk of side effects due to history of arthralgia previously reported - full work up not performed, may consider. Alendronate could be re-trialed for cost with close monitoring for side effects. Prolia may be best option as patient mechanism of action differs from bisphosphonates and may reduce risk of arthralgia (though this is reported in patients) and convenience of semi-annual dosing.  Do not recommend restarting Raloxefine due to clot risk with longterm use. Patient to consider and  discuss with PCP after DEXA and labs.    Patient meeting goal calcium 1200 mg daily with calcium supplement and diet - recommend continue at least 1 serving dairy daily + 500 mg Ca twice daily.  Meeting NOF recommended 1000 IU vitamin D daily.  Due for vitamin D level at next visit with other labs.     -----------------------------------------  .  COPD: Trigger may be cold, dry air - recommend to dry humidifier. May consider LABA or LAMA in the future if more control needed (indicated by increase use of albuterol).   -----------------------------------------  .    Pain: Stable.  -----------------------------------------  .      Dry Eye: Stable.  -----------------------------------------  .    Supplements: Stable.    PLAN:                            Consider Alendronate (tabs once weekly), Reclast (infusion once per year), or Prolia (Injection every 6 months in clinic) for osteoporosis. I've included some information for you to consider before your upcoming bone scan (Dexa) to make this treatment decision. Contact your dentist for an appointment prior to starting these meds as there can be changes in your jaw bone that it may be beneficial to have any dental work done prior to starting these meds.    IF you start to notice an increase in your use of albuterol, it may be related to cold, dry air. Try using a humidifier in your home (bedroom may be best while you sleep) to help reduce your cough and need for albuterol.      Follow up: 3/28/24 at 10:30 a.m. with Dr. Lay. In about 6 months with Medication Therapy Management Pharmacist    SUBJECTIVE/OBJECTIVE:                          Milly Weinberg is a 78 year old female coming in for a follow up  visit. Today's visit is a follow up from 11/9/23. New PCP - Greer Lay MD     Reason for visit: Medication Therapy Management - blood pressure     Allergies/ADRs: Reviewed in chart  Past Medical History: Reviewed in chart  Tobacco: She reports that she has quit  smoking. Her smoking use included cigarettes. She has never used smokeless tobacco.  Alcohol: not currently using  Caffeine: none (decaf coffee only)    Medication Adherence/Access: No issues identified    -----------------------------------------  .    Anxiety/RLS/insomnia/memory loss:   Lorazepam  0.5 mg tablet twice daily   Mirtazapine  30 mg tablet 1 tablet once daily  Escitalopram 10 mg -1 tablet daily  Pramipexole 1 mg -2 tablets nightly    Following with Psychiatry -Maria Antonia FREY CNS at [facility] - previous communication with this provider was clear she wants to manage all mental health meds.    Finding anxiety better managed with starting propranolol. Escitalopram working well for depression (finding more hobbies and enjoys doing them again)- denies side effects. Also gaining weight as desired as has more of an appetite now that mental health improved. Sleep is good - sleeps through until 6:30 am most mornings- this is normal wake up time for her. She is rested.         8/28/2023     1:53 PM   PHQ   PHQ-9 Total Score 0   Q9: Thoughts of better off dead/self-harm past 2 weeks Not at all         8/28/2023     1:53 PM   JAIME-7 SCORE   Total Score 12      -----------------------------------------  .      Hypertension/sinus tachycardia:   Propranolol 10 mg - 10 mg in the morning and 20 mg in the evening    Denies side effects. Patient self-monitors blood pressure.  Blood pressure consistently has been at goal at home since starting this regimen. Also helps anxiety (see above).    Home BP:  Today:114/71 HR 83  Averages: 110-120s/60-70s     Clinic BP:  BP Readings from Last 3 Encounters:   01/18/24 126/78   11/09/23 136/60   10/12/23 132/60      -----------------------------------------  .      Osteoporosis:   Calcium 500 mg twice daily   Vitamin D 1000 international units daily    Patient has started these therapies after receiving from pharmacy . is not experiencing side effects.     Per Chart:  Has been on  alendronate in the past (2010) - stopped due to joint pain and dysphagia suspected from alendronate   Started Raloxefine 2010 - was on until 2019. Repeat dexa, stable, but unclear that raloxefine was stopped intentionally or fell off med list. Patient has no memory of this.    DEXA History: January 2022 -osteoporosis; has follow up DEXA scheduled in a few weeks.  Patient is getting the following sources of dietary calcium: Dairy and green leafy vegetables  Risk factors: post-menopausal  Vitamin D Deficiency Screening Results:  Lab Results   Component Value Date    VITDT 62 10/26/2021     Last Comprehensive Metabolic Panel: 4/10/23  Lab Results   Component Value        POTASSIUM 4.6    CHLORIDE 105    CO2 24    ANIONGAP 13    GLC 83    BUN 18.1    CR 0.72    GFRESTIMATED 86    NATALY 9.8           -----------------------------------------  .  COPD:   Albuterol (ProAir/Ventolin/Proventil)    Side effects: none.    Patient reports the following symptoms: none.  Patient rarely uses albuterol inhaler. Notes some increase in albuterol with cold dry air (once daily in the past week), in the past days hasn't needed with warmer weather.  -----------------------------------------  .    Pain:   Acetaminophen 325 mg tabs - 2 tabs as needed up to twice daily     Pain currently well managed - several months post hip surgery after fall. Uses acetaminophen only occasionally when pain interrupts activities or sleep. Denies side effects.   -----------------------------------------  .      Dry Eye:   Restasis 0.05% emulsion 1 drop affected eye twice daily    Works well.  Able to afford.  Denies side effects.  -----------------------------------------  .    Supplements:   Flaxseed powder once daily      Works well.  Denies side effects.  Takes for general health.    -----------------------------------------  .    Today's Vitals: /78   Pulse 78   Wt 126 lb 3.2 oz (57.2 kg)   BMI 23.08 kg/m    ----------------      I spent 45  minutes with this patient today. All changes were made via collaborative practice agreement with Greer Lay MD . A copy of the visit note was provided to the patient's provider(s).    A summary of these recommendations was given to the patient today & sent via [employer].    Caitlyn Jensen, Pharm D., MPH  MTM Pharmacist - [facility]  Secure Voicemail: [phone]  In Office: Monday - Thursday           Medication Therapy Recommendations  Age-related osteoporosis without current pathological fracture    Current Medication: calcium carbonate 500 mg, elemental, (OSCAL) 500 MG tablet   Rationale: Synergistic therapy - Needs additional medication therapy - Indication   Recommendation: Start Medication   Status: Contact Provider - Awaiting Response                 We sent a prescription for a continuous glucose monitor (Freestyle Lia) to [facility]. This is a mail order diabetes supply company based out of [place]. They will likely be calling you to coordinate delivery from a [place] phone number, for your reference. If you miss their call or haven't heard from them in a day or two, you may call them at [phone] to check on the status of your order.

## 2024-03-09 ENCOUNTER — HEALTH MAINTENANCE LETTER (OUTPATIENT)
Age: 79
End: 2024-03-09

## 2024-04-05 ENCOUNTER — TRANSFERRED RECORDS (OUTPATIENT)
Dept: HEALTH INFORMATION MANAGEMENT | Facility: CLINIC | Age: 79
End: 2024-04-05

## 2024-04-05 ENCOUNTER — HOSPITAL ENCOUNTER (OUTPATIENT)
Dept: CARDIOLOGY | Facility: CLINIC | Age: 79
Discharge: HOME | End: 2024-04-05
Attending: STUDENT IN AN ORGANIZED HEALTH CARE EDUCATION/TRAINING PROGRAM
Payer: COMMERCIAL

## 2024-04-05 DIAGNOSIS — R06.02 SHORTNESS OF BREATH: ICD-10-CM

## 2024-04-05 LAB — LVEF ECHO: NORMAL

## 2024-04-05 PROCEDURE — 93306 TTE W/DOPPLER COMPLETE: CPT

## 2024-04-05 PROCEDURE — 93306 TTE W/DOPPLER COMPLETE: CPT | Mod: 26 | Performed by: INTERNAL MEDICINE

## 2024-04-09 ENCOUNTER — TRANSFERRED RECORDS (OUTPATIENT)
Dept: HEALTH INFORMATION MANAGEMENT | Facility: CLINIC | Age: 79
End: 2024-04-09
Payer: COMMERCIAL

## 2024-04-09 LAB — PHQ9 SCORE: 1

## 2024-04-29 ENCOUNTER — MEDICAL CORRESPONDENCE (OUTPATIENT)
Dept: HEALTH INFORMATION MANAGEMENT | Facility: CLINIC | Age: 79
End: 2024-04-29
Payer: COMMERCIAL

## 2024-04-30 ENCOUNTER — OFFICE VISIT (OUTPATIENT)
Dept: PHARMACY | Facility: PHYSICIAN GROUP | Age: 79
End: 2024-04-30
Payer: COMMERCIAL

## 2024-04-30 ENCOUNTER — TRANSFERRED RECORDS (OUTPATIENT)
Dept: HEALTH INFORMATION MANAGEMENT | Facility: CLINIC | Age: 79
End: 2024-04-30
Payer: COMMERCIAL

## 2024-04-30 VITALS — HEART RATE: 75 BPM | SYSTOLIC BLOOD PRESSURE: 138 MMHG | DIASTOLIC BLOOD PRESSURE: 72 MMHG | OXYGEN SATURATION: 98 %

## 2024-04-30 DIAGNOSIS — R00.0 SINUS TACHYCARDIA: ICD-10-CM

## 2024-04-30 DIAGNOSIS — I10 BENIGN ESSENTIAL HYPERTENSION: ICD-10-CM

## 2024-04-30 DIAGNOSIS — F41.1 GENERALIZED ANXIETY DISORDER: Primary | ICD-10-CM

## 2024-04-30 DIAGNOSIS — G25.81 RESTLESS LEGS SYNDROME (RLS): ICD-10-CM

## 2024-04-30 DIAGNOSIS — R41.3 MEMORY LOSS: ICD-10-CM

## 2024-04-30 DIAGNOSIS — J45.909 UNCOMPLICATED ASTHMA, UNSPECIFIED ASTHMA SEVERITY, UNSPECIFIED WHETHER PERSISTENT: ICD-10-CM

## 2024-04-30 DIAGNOSIS — G47.00 PERSISTENT INSOMNIA: ICD-10-CM

## 2024-04-30 PROCEDURE — 99607 MTMS BY PHARM ADDL 15 MIN: CPT | Performed by: PHARMACIST

## 2024-04-30 PROCEDURE — 99606 MTMS BY PHARM EST 15 MIN: CPT | Performed by: PHARMACIST

## 2024-04-30 RX ORDER — FLUTICASONE PROPIONATE AND SALMETEROL 113; 14 UG/1; UG/1
1 POWDER, METERED RESPIRATORY (INHALATION) 2 TIMES DAILY
COMMUNITY

## 2024-04-30 NOTE — PATIENT INSTRUCTIONS
"Recommendations from today's MTM visit:                                                        Continue current medications    Follow-up: Return in about 3 months (around 7/30/2024) for MTM Follow Up. - Please schedule a follow up appointment with Rc Funk - MTM pharmacist once his schedule is open and he's in clinic.    It was great speaking with you today.  I value your experience and would be very thankful for your time in providing feedback in our clinic survey. In the next few days, you may receive an email or text message from [employer] with a link to a survey related to your  clinical pharmacist.\"     To schedule another MTM appointment, please call the clinic directly or you may call the MTM scheduling line at [phone].    My Clinical Pharmacist's contact information:                                                      Please feel free to contact me with any questions or concerns you have.      Geri Munson, PharmD  Medication Therapy Management Pharmacist     "

## 2024-04-30 NOTE — PROGRESS NOTES
Medication Therapy Management (MTM) Encounter    ASSESSMENT:                            Medication Adherence/Access: No issues identified  .  Hypertension/sinus tachycardia: blood pressure is at goal of below 140/90 mmHg.  .  Asthma: Improving  .   Anxiety/RLS/insomnia/memory issues: may not need to start donepezil at this time, her worsened memory recently may have been related to increased anxiety.  Could consider the donepezil after 3-4 months if anxiety stable.  Long term goal to slowly reduce lorazepam if able.    PLAN:                             Continue current medications.  Refill calcium, pramipexole and albuterol  Recommend monitoring memory issues over the next few months.  If still noticing these issues or continuing to see symptoms worsen could consider starting the donepezil.    Follow-up: Return in about 3 months (around 7/30/2024) for MTM Follow Up.  - Discuss osteoporosis treatments at next visit.    SUBJECTIVE/OBJECTIVE:                          Milly Weinberg is a 79 year old female coming in for a follow-up visit.       Reason for visit: medication review    Allergies/ADRs: Reviewed in chart  Past Medical History: Reviewed in chart  Tobacco: She reports that she has quit smoking. Her smoking use included cigarettes. She has never used smokeless tobacco.  Alcohol: none    Medication Adherence/Access: no issues reported    Hypertension/sinus tachycardia:   - Propranolol 20 mg twice daily (8 am and 6pm)  Denies side effects. Patient self-monitors blood pressure.  Feels that blood pressure somewhat more often elevated in the evening. Doesn't feel this is high enough dose for blood pressure, though anxiety has improved.  She has referral to cardiology for ECHO and ECG     Asthma:   - Fluticasone-salmeterol 113-14 mcg 1 puff twice daily - started around 3/28/24  - Albuterol HFA as needed - Uses rarely  Feels like the new inhaler is helping with her breathing issues, not feeling as short of  breath.  Patient rinses their mouth after using steroid inhaler.   Patient reports no current medication side effects.      Triggers include: smoke, upper respiratory infections, and pollens.  Patient reports the following symptoms: none.    Anxiety/RLS/insomnia/memory issues:   - Lorazepam  0.5 mg tablet twice daily   - Mirtazapine  30 mg tablet 1 tablet once daily at bedtime  - Escitalopram 10 mg -1 tablet daily  - Pramipexole 1 mg -2 tablets nightly  Following with Psychiatry -Maria Antonia FREY, CNS at [facility].  She manages [facility] psychiatry medications.  Spoke to her today about care plan, and she would be open to very slowly reducing lorazepam but right now likely not the best time due to coming up on the anniversary of her daughters passing.    Goal of care is so make smaller adjustments more gradually, vs changing too many things at once.  Memory issues occurring more recently, reports some difficulty with remembering names the last few months.  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 Her move to her new apartment has been stressful, was feeling down initially but doing a bit better now.  Patient recently saw a neurologist, MMSE and MOCA testing were done in the last few months.  Increased anxiety can results in skewed results from these tests.  Hasn't had more extensive neurocognitive testing done.  Finding anxiety somewhat better managed with starting propranolol as well.  Escitalopram working well for depression (finding more hobbies and enjoys doing them again)- denies side effects. Initially when escitalopram ws added had insomnia from it, but this did much better on the lower dose.  They were later able to increase the escitalopram dose to 10 mg.  Also gaining weight as desired as has more of an appetite now that mental health improved. Tried stopping lorazepam during the day, but finds anxiety especially heightened in the day  time. Sleep is good - sleeps through until 6:30 am most mornings- this is normal wake up time for her. She is rested.   Past meds:  Sertraline- insomnia    Today's Vitals: /72   Pulse 75   SpO2 98%   ----------------      I spent 60 minutes with this patient today. All changes were made via collaborative practice agreement with Greer Lay MD. A copy of the visit note was provided to the patient's provider(s).    A summary of these recommendations was declined by the patient.    Geri Munson, Karly  Medication Therapy Management Pharmacist           Medication Therapy Recommendations  No medication therapy recommendations to display

## 2024-05-03 ENCOUNTER — OFFICE VISIT (OUTPATIENT)
Dept: CARDIOLOGY | Facility: CLINIC | Age: 79
End: 2024-05-03
Payer: COMMERCIAL

## 2024-05-03 VITALS
SYSTOLIC BLOOD PRESSURE: 135 MMHG | BODY MASS INDEX: 23.05 KG/M2 | HEART RATE: 79 BPM | DIASTOLIC BLOOD PRESSURE: 74 MMHG | RESPIRATION RATE: 15 BRPM | WEIGHT: 126 LBS | OXYGEN SATURATION: 96 %

## 2024-05-03 DIAGNOSIS — R06.09 DOE (DYSPNEA ON EXERTION): Primary | ICD-10-CM

## 2024-05-03 DIAGNOSIS — J43.2 CENTRILOBULAR EMPHYSEMA (H): ICD-10-CM

## 2024-05-03 DIAGNOSIS — I25.10 CORONARY ARTERY CALCIFICATION: ICD-10-CM

## 2024-05-03 DIAGNOSIS — R60.0 BILATERAL LOWER EXTREMITY EDEMA: ICD-10-CM

## 2024-05-03 PROCEDURE — 99204 OFFICE O/P NEW MOD 45 MIN: CPT | Performed by: INTERNAL MEDICINE

## 2024-05-03 NOTE — PATIENT INSTRUCTIONS
Continue current medications  Set up echo to get an idea of pressures in your lungs and a nuclear stress test to look at blood flow to the heart

## 2024-05-03 NOTE — LETTER
5/3/2024    Greer Lay MD  [id number] [place] [address]    RE: Sharon Weinberg       Dear Colleague,     I had the pleasure of seeing Sharon Weinberg in the [facility].      Thank you, Dr. Greer Lay, for asking the [facility] team to see Ms. Sharon Weinberg to evaluate dyspnea on exertion.    Assessment/Recommendations   Assessment:    1.  Dyspnea on exertion, etiology unclear.  As you know, she recently underwent a stress echo study but was only able to achieve 70% of target heart rate due to shortness of breath and hypertensive response.  At that level, no obvious ischemia was identified although this is well below the target heart rate of greater than 85%.  She did have a CT scan done a year ago which demonstrates severe coronary artery calcifications so I cannot exclude coronary artery disease.  I did recommend that we pursue a pharmacologic nuclear stress test to exclude this possibility.  That CT scan also revealed severe centrilobular emphysema so I suspect her COPD may be contributing.  If the stress study is unrevealing, I would consider referral to pulmonary medicine.  2.  Lower extremity edema, etiology unclear.  No evidence of pulmonary hypertension or right ventricular dysfunction based on her recent echocardiogram.  No evidence of diastolic dysfunction.  May be related to venous insufficiency.  Again could consider small dose of hydrochlorothiazide to help with edema and blood pressure.  3.  Elevated blood pressure without diagnosis of hypertension.  Might consider small dose of hydrochlorothiazide or vasodilator therapy if her blood pressures remain elevated.  4.  Emphysema    Plan:  1.  Continue current medications  2.  Schedule pharmacologic nuclear stress test to evaluate for ischemia.  If this is normal, would recommend referral to pulmonary medicine       History of Present Illness    Ms. Sharon Weinberg is a 79 year old  female with longstanding history of tobacco use, coronary artery calcification and evidence of severe centrilobular emphysema by CT scan a year ago who presents to the office today due to symptoms of exertional dyspnea and an equivocal stress echo study.  She was recently referred for a stress echo study where she only achieved 70% of target heart rate due to hypertensive response.  No ischemic changes were noted on the ECG at that point no evidence of ischemia by echo imaging although this is well below the target heart rate of 85% thus making it possible for ischemia to be present but not identified.  She denies any associated chest discomfort.  Does have chronic two-pillow orthopnea but denies PND.  Has had some ankle edema for a while.  Did have a chest CT done 1 year ago to assess for pulmonary emboli.  No pulmonary emboli were identified; however, there was evidence of severe centrilobular emphysema as well as severe coronary artery calcification.    ECG (personally reviewed): No ECG today    Cardiac Imaging Studies (personally reviewed):   Stress echo:  Interpretation Summary  1.Poor exercise tolerance for age with patient achieving 3.5 METS on the  standard Darryl protocol.  2.Inadequate negative exercise ECG for ischemia after 4 minutes and 5 seconds  on the standard Darryl protocol with patient achieving heart rate of 100 for  only 70% of age-predicted maximal heart rate, however hypertensive response  with blood pressure 213/70.  3.Resting images show ejection fraction of 60% with some mild aortic  insufficiency.  4.Post exercise images ejection fraction improved to 70% with no new wall  motion abnormalities developing.  5.Inadequate negative exercise ECG for ischemia, sensitivity limited as  patient did not achieve over 85% of age-predicted maximal heart rate.  When compared with prior transthoracic study from April 5, 2024 there is no  significant change.    Procedure  Complete Echo Adult.  4/5/2024  ______________________________________________________________________________  Interpretation Summary     1.Left ventricular size, wall motion and function are normal. The ejection  fraction is 60-65%.  2.Normal right ventricle size and systolic function.  3.There is mild trileaflet aortic sclerosis. There is mild (1+) aortic  regurgitation. Decel 1/2 time is 576 msec.  There is no comparison study available.  _____________________________________  ________________________     Physical Examination Review of Systems   /74 (BP Location: Right arm, Patient Position: Sitting, Cuff Size: Adult Regular)   Pulse 79   Resp 15   Wt 57.2 kg (126 lb)   SpO2 96%   BMI 23.05 kg/m    Body mass index is 23.05 kg/m .  Wt Readings from Last 3 Encounters:   05/03/24 57.2 kg (126 lb)   01/18/24 57.2 kg (126 lb 3.2 oz)   11/09/23 53.9 kg (118 lb 12.8 oz)     General Appearance:   Awake, Alert, No acute distress.   HEENT:  No scleral icterus; the mucous membranes were pink and moist.   Neck: No cervical bruits or jugular venous distention    Chest: The spine was straight. The chest was symmetric.   Lungs:   Respirations unlabored; diminished breath sounds through both lung fields.   Cardiovascular:   Regular rate and rhythm.  S1, S2 normal.  1/6 diastolic decrescendo murmur noted.   Abdomen:  No organomegaly, masses, bruits, or tenderness. Bowels sounds are present   Extremities: 2+ pitting edema of the right ankle, 1+ pitting edema of the left ankle   Skin: No xanthelasma. Warm, Dry.   Musculoskeletal: No tenderness.   Neurologic: Mood and affect are appropriate.    Enc Vitals  BP: 135/74  Pulse: 79  Resp: 15  SpO2: 96 %  Weight: 57.2 kg (126 lb)                                         Medical History  Surgical History Family History Social History   No past medical history on file. No past surgical history on file. Family History   Problem Relation Age of Onset    Coronary Artery Disease Father 90    Heart  Disease Sister     Ovarian Cancer Daughter     Hereditary Breast and Ovarian Cancer Syndrome Daughter     Social History     Socioeconomic History    Marital status:      Spouse name: Not on file    Number of children: Not on file    Years of education: Not on file    Highest education level: Not on file   Occupational History    Not on file   Tobacco Use    Smoking status: Former     Types: Cigarettes    Smokeless tobacco: Never   Substance and Sexual Activity    Alcohol use: Not on file    Drug use: Not on file    Sexual activity: Not on file   Other Topics Concern    Not on file   Social History Narrative    Not on file     Social Determinants of Health     Financial Resource Strain: High Risk (1/1/2022)    [facility] [employer]    Financial Resource Strain     Difficulty of Paying Living Expenses: Not on file     Difficulty of Paying Living Expenses: Not on file   Food Insecurity: Not on file   Transportation Needs: Not on file   Physical Activity: Not on file   Stress: Not on file   Social Connections: Unknown (1/1/2022)    [facility] [employer]    Social Connections     Frequency of Communication with Friends and Family: Not on file   Interpersonal Safety: Not on file   Housing Stability: Not on file          Medications  Allergies   Current Outpatient Medications   Medication Sig Dispense Refill    acetaminophen (TYLENOL) 325 MG tablet Take 650 mg by mouth every 6 hours as needed for mild pain      albuterol (PROAIR HFA/PROVENTIL HFA/VENTOLIN HFA) 108 (90 Base) MCG/ACT inhaler Inhale 1-2 puffs into the lungs every 4 hours as needed      calcium carbonate 500 mg, elemental, (OSCAL) 500 MG tablet Take 1 tablet by mouth 2 times daily      carboxymethylcellulose PF (REFRESH PLUS) 0.5 % ophthalmic solution 1 drop 3 times daily      cycloSPORINE  (RESTASIS) 0.05 % ophthalmic emulsion Place 1 drop into both eyes every 12 hours      escitalopram (LEXAPRO) 10 MG tablet Take 10 mg by mouth daily      Flaxseed, Linseed, (FLAX SEEDS) POWD Take 1 teaspoonful by mouth daily with food      fluticasone-salmeterol (AIRDUO RESPICLICK) 113-14 MCG/ACT inhaler Inhale 1 puff into the lungs 2 times daily      LORazepam (ATIVAN) 0.5 MG tablet Take 1 tablet by mouth 2 times daily      mirtazapine (REMERON) 30 MG tablet Take 30 mg by mouth At Bedtime      pramipexole (MIRAPEX) 1 MG tablet Take 2 mg by mouth At Bedtime      propranolol (INDERAL) 20 MG tablet Take by mouth 2 times daily 1 tablet in the morning and 2 tablets with supper      Vitamin D3 (VITAMIN D, CHOLECALCIFEROL,) 25 mcg (1000 units) tablet Take 1 tablet by mouth daily        Allergies   Allergen Reactions    Penicillins Palpitations, Anaphylaxis, Hives and Difficulty breathing    Cephalexin Hives    Doxycycline Itching and Rash    Sulfa Antibiotics Hives    Sulfatolamide Hives         Lab Results    Chemistry/lipid CBC Cardiac Enzymes/BNP/TSH/INR   Recent Labs   Lab Test 12/27/23  1145 04/10/23  1010   TRIG 82  --    LDL 91  --    BUN  --  18.1   NA  --  142   CO2  --  24    Recent Labs   Lab Test 02/28/23  0600 02/23/23  0713   WBC  --  8.1   HGB 10.1* 8.4*   HCT  --  27.5*   MCV  --  104*   PLT  --  275    Recent Labs   Lab Test 04/10/23  1010   TSH 1.14        A total of 45 minutes was spent reviewing patient's medical records, obtaining history and performing examination, as well as discussing diagnoses/ recommendations with patient and answering all questions.                        Thank you for allowing me to participate in the care of your patient.      Sincerely,     Lucero Celis MD     [facility]  cc:   Greer Lay MD  [address] [place] [address]

## 2024-05-03 NOTE — PROGRESS NOTES
Thank you, Dr. Greer Lay, for asking the [facility] team to see Ms. Sharon Weinberg to evaluate dyspnea on exertion.    Assessment/Recommendations   Assessment:    1.  Dyspnea on exertion, etiology unclear.  As you know, she recently underwent a stress echo study but was only able to achieve 70% of target heart rate due to shortness of breath and hypertensive response.  At that level, no obvious ischemia was identified although this is well below the target heart rate of greater than 85%.  She did have a CT scan done a year ago which demonstrates severe coronary artery calcifications so I cannot exclude coronary artery disease.  I did recommend that we pursue a pharmacologic nuclear stress test to exclude this possibility.  That CT scan also revealed severe centrilobular emphysema so I suspect her COPD may be contributing.  If the stress study is unrevealing, I would consider referral to pulmonary medicine.  2.  Lower extremity edema, etiology unclear.  No evidence of pulmonary hypertension or right ventricular dysfunction based on her recent echocardiogram.  No evidence of diastolic dysfunction.  May be related to venous insufficiency.  Again could consider small dose of hydrochlorothiazide to help with edema and blood pressure.  3.  Elevated blood pressure without diagnosis of hypertension.  Might consider small dose of hydrochlorothiazide or vasodilator therapy if her blood pressures remain elevated.  4.  Emphysema    Plan:  1.  Continue current medications  2.  Schedule pharmacologic nuclear stress test to evaluate for ischemia.  If this is normal, would recommend referral to pulmonary medicine       History of Present Illness    Ms. Sharon Weinberg is a 79 year old female with longstanding history of tobacco use, coronary artery calcification and evidence of severe centrilobular emphysema by CT scan a year ago who presents to the office today due to symptoms of exertional  dyspnea and an equivocal stress echo study.  She was recently referred for a stress echo study where she only achieved 70% of target heart rate due to hypertensive response.  No ischemic changes were noted on the ECG at that point no evidence of ischemia by echo imaging although this is well below the target heart rate of 85% thus making it possible for ischemia to be present but not identified.  She denies any associated chest discomfort.  Does have chronic two-pillow orthopnea but denies PND.  Has had some ankle edema for a while.  Did have a chest CT done 1 year ago to assess for pulmonary emboli.  No pulmonary emboli were identified; however, there was evidence of severe centrilobular emphysema as well as severe coronary artery calcification.    ECG (personally reviewed): No ECG today    Cardiac Imaging Studies (personally reviewed):   Stress echo:  Interpretation Summary  1.Poor exercise tolerance for age with patient achieving 3.5 METS on the  standard Darryl protocol.  2.Inadequate negative exercise ECG for ischemia after 4 minutes and 5 seconds  on the standard Darryl protocol with patient achieving heart rate of 100 for  only 70% of age-predicted maximal heart rate, however hypertensive response  with blood pressure 213/70.  3.Resting images show ejection fraction of 60% with some mild aortic  insufficiency.  4.Post exercise images ejection fraction improved to 70% with no new wall  motion abnormalities developing.  5.Inadequate negative exercise ECG for ischemia, sensitivity limited as  patient did not achieve over 85% of age-predicted maximal heart rate.  When compared with prior transthoracic study from April 5, 2024 there is no  significant change.    Procedure  Complete Echo Adult. 4/5/2024  ______________________________________________________________________________  Interpretation Summary     1.Left ventricular size, wall motion and function are normal. The ejection  fraction is 60-65%.  2.Normal  right ventricle size and systolic function.  3.There is mild trileaflet aortic sclerosis. There is mild (1+) aortic  regurgitation. Decel 1/2 time is 576 msec.  There is no comparison study available.  _____________________________________  ________________________     Physical Examination Review of Systems   /74 (BP Location: Right arm, Patient Position: Sitting, Cuff Size: Adult Regular)   Pulse 79   Resp 15   Wt 57.2 kg (126 lb)   SpO2 96%   BMI 23.05 kg/m    Body mass index is 23.05 kg/m .  Wt Readings from Last 3 Encounters:   05/03/24 57.2 kg (126 lb)   01/18/24 57.2 kg (126 lb 3.2 oz)   11/09/23 53.9 kg (118 lb 12.8 oz)     General Appearance:   Awake, Alert, No acute distress.   HEENT:  No scleral icterus; the mucous membranes were pink and moist.   Neck: No cervical bruits or jugular venous distention    Chest: The spine was straight. The chest was symmetric.   Lungs:   Respirations unlabored; diminished breath sounds through both lung fields.   Cardiovascular:   Regular rate and rhythm.  S1, S2 normal.  1/6 diastolic decrescendo murmur noted.   Abdomen:  No organomegaly, masses, bruits, or tenderness. Bowels sounds are present   Extremities: 2+ pitting edema of the right ankle, 1+ pitting edema of the left ankle   Skin: No xanthelasma. Warm, Dry.   Musculoskeletal: No tenderness.   Neurologic: Mood and affect are appropriate.    Enc Vitals  BP: 135/74  Pulse: 79  Resp: 15  SpO2: 96 %  Weight: 57.2 kg (126 lb)                                         Medical History  Surgical History Family History Social History   No past medical history on file. No past surgical history on file. Family History   Problem Relation Age of Onset    Coronary Artery Disease Father 90    Heart Disease Sister     Ovarian Cancer Daughter     Hereditary Breast and Ovarian Cancer Syndrome Daughter     Social History     Socioeconomic History    Marital status:      Spouse name: Not on file    Number of children: Not  on file    Years of education: Not on file    Highest education level: Not on file   Occupational History    Not on file   Tobacco Use    Smoking status: Former     Types: Cigarettes    Smokeless tobacco: Never   Substance and Sexual Activity    Alcohol use: Not on file    Drug use: Not on file    Sexual activity: Not on file   Other Topics Concern    Not on file   Social History Narrative    Not on file     Social Determinants of Health     Financial Resource Strain: High Risk (1/1/2022)    [facility] [employer] [facility]    Financial Resource Strain     Difficulty of Paying Living Expenses: Not on file     Difficulty of Paying Living Expenses: Not on file   Food Insecurity: Not on file   Transportation Needs: Not on file   Physical Activity: Not on file   Stress: Not on file   Social Connections: Unknown (1/1/2022)    [facility] from [employer] [facility], [facility], [facility]    Social Connections     Frequency of Communication with Friends and Family: Not on file   Interpersonal Safety: Not on file   Housing Stability: Not on file          Medications  Allergies   Current Outpatient Medications   Medication Sig Dispense Refill    acetaminophen (TYLENOL) 325 MG tablet Take 650 mg by mouth every 6 hours as needed for mild pain      albuterol (PROAIR HFA/PROVENTIL HFA/VENTOLIN HFA) 108 (90 Base) MCG/ACT inhaler Inhale 1-2 puffs into the lungs every 4 hours as needed      calcium carbonate 500 mg, elemental, (OSCAL) 500 MG tablet Take 1 tablet by mouth 2 times daily      carboxymethylcellulose PF (REFRESH PLUS) 0.5 % ophthalmic solution 1 drop 3 times daily      cycloSPORINE (RESTASIS) 0.05 % ophthalmic emulsion Place 1 drop into both eyes every 12 hours      escitalopram (LEXAPRO) 10 MG tablet Take 10 mg by mouth daily      Flaxseed, Linseed, (FLAX SEEDS) POWD Take 1 teaspoonful by mouth daily with  food      fluticasone-salmeterol (AIRDUO RESPICLICK) 113-14 MCG/ACT inhaler Inhale 1 puff into the lungs 2 times daily      LORazepam (ATIVAN) 0.5 MG tablet Take 1 tablet by mouth 2 times daily      mirtazapine (REMERON) 30 MG tablet Take 30 mg by mouth At Bedtime      pramipexole (MIRAPEX) 1 MG tablet Take 2 mg by mouth At Bedtime      propranolol (INDERAL) 20 MG tablet Take by mouth 2 times daily 1 tablet in the morning and 2 tablets with supper      Vitamin D3 (VITAMIN D, CHOLECALCIFEROL,) 25 mcg (1000 units) tablet Take 1 tablet by mouth daily        Allergies   Allergen Reactions    Penicillins Palpitations, Anaphylaxis, Hives and Difficulty breathing    Cephalexin Hives    Doxycycline Itching and Rash    Sulfa Antibiotics Hives    Sulfatolamide Hives         Lab Results    Chemistry/lipid CBC Cardiac Enzymes/BNP/TSH/INR   Recent Labs   Lab Test 12/27/23  1145 04/10/23  1010   TRIG 82  --    LDL 91  --    BUN  --  18.1   NA  --  142   CO2  --  24    Recent Labs   Lab Test 02/28/23  0600 02/23/23  0713   WBC  --  8.1   HGB 10.1* 8.4*   HCT  --  27.5*   MCV  --  104*   PLT  --  275    Recent Labs   Lab Test 04/10/23  1010   TSH 1.14        A total of 45 minutes was spent reviewing patient's medical records, obtaining history and performing examination, as well as discussing diagnoses/ recommendations with patient and answering all questions.

## 2024-05-17 ENCOUNTER — TELEPHONE (OUTPATIENT)
Dept: CARDIOLOGY | Facility: CLINIC | Age: 79
End: 2024-05-17
Payer: COMMERCIAL

## 2024-05-20 ENCOUNTER — HOSPITAL ENCOUNTER (OUTPATIENT)
Dept: NUCLEAR MEDICINE | Facility: CLINIC | Age: 79
Discharge: HOME | End: 2024-05-20
Attending: INTERNAL MEDICINE
Payer: COMMERCIAL

## 2024-05-20 ENCOUNTER — HOSPITAL ENCOUNTER (OUTPATIENT)
Dept: CARDIOLOGY | Facility: CLINIC | Age: 79
Discharge: HOME | End: 2024-05-20
Attending: INTERNAL MEDICINE
Payer: COMMERCIAL

## 2024-05-20 DIAGNOSIS — I25.10 CORONARY ARTERY CALCIFICATION: ICD-10-CM

## 2024-05-20 DIAGNOSIS — R06.09 DOE (DYSPNEA ON EXERTION): ICD-10-CM

## 2024-05-20 LAB
CV STRESS CURRENT BP HE: NORMAL
CV STRESS CURRENT HR HE: 100
CV STRESS CURRENT HR HE: 101
CV STRESS CURRENT HR HE: 105
CV STRESS CURRENT HR HE: 107
CV STRESS CURRENT HR HE: 107
CV STRESS CURRENT HR HE: 71
CV STRESS CURRENT HR HE: 76
CV STRESS CURRENT HR HE: 80
CV STRESS CURRENT HR HE: 82
CV STRESS CURRENT HR HE: 85
CV STRESS CURRENT HR HE: 87
CV STRESS CURRENT HR HE: 89
CV STRESS CURRENT HR HE: 94
CV STRESS CURRENT HR HE: 94
CV STRESS CURRENT HR HE: 95
CV STRESS CURRENT HR HE: 96
CV STRESS CURRENT HR HE: 98
CV STRESS DEVIATION TIME HE: NORMAL
CV STRESS ECHO PERCENT HR HE: NORMAL
CV STRESS EXERCISE STAGE HE: NORMAL
CV STRESS FINAL RESTING BP HE: NORMAL
CV STRESS FINAL RESTING HR HE: 85
CV STRESS MAX HR HE: 107
CV STRESS MAX TREADMILL GRADE HE: 0
CV STRESS MAX TREADMILL SPEED HE: 0
CV STRESS PEAK DIA BP HE: NORMAL
CV STRESS PEAK SYS BP HE: NORMAL
CV STRESS PHASE HE: NORMAL
CV STRESS PROTOCOL HE: NORMAL
CV STRESS RESTING PT POSITION HE: NORMAL
CV STRESS ST DEVIATION AMOUNT HE: NORMAL
CV STRESS ST DEVIATION ELEVATION HE: NORMAL
CV STRESS ST EVELATION AMOUNT HE: NORMAL
CV STRESS TEST TYPE HE: NORMAL
CV STRESS TOTAL STAGE TIME MIN 1 HE: NORMAL
RATE PRESSURE PRODUCT: NORMAL
STRESS ECHO BASELINE DIASTOLIC HE: 68
STRESS ECHO BASELINE HR: 79
STRESS ECHO BASELINE SYSTOLIC BP: 117
STRESS ECHO CALCULATED PERCENT HR: 75 %
STRESS ECHO LAST STRESS DIASTOLIC BP: 86
STRESS ECHO LAST STRESS HR: 107
STRESS ECHO LAST STRESS SYSTOLIC BP: 148
STRESS ECHO TARGET HR: 143

## 2024-05-20 PROCEDURE — 343N000001 HC RX 343: Performed by: INTERNAL MEDICINE

## 2024-05-20 PROCEDURE — 78452 HT MUSCLE IMAGE SPECT MULT: CPT | Mod: 26 | Performed by: GENERAL ACUTE CARE HOSPITAL

## 2024-05-20 PROCEDURE — 250N000011 HC RX IP 250 OP 636: Mod: JZ | Performed by: INTERNAL MEDICINE

## 2024-05-20 PROCEDURE — 93016 CV STRESS TEST SUPVJ ONLY: CPT | Performed by: INTERNAL MEDICINE

## 2024-05-20 PROCEDURE — 78452 HT MUSCLE IMAGE SPECT MULT: CPT

## 2024-05-20 PROCEDURE — 93018 CV STRESS TEST I&R ONLY: CPT | Performed by: GENERAL ACUTE CARE HOSPITAL

## 2024-05-20 PROCEDURE — A9500 TC99M SESTAMIBI: HCPCS | Performed by: INTERNAL MEDICINE

## 2024-05-20 PROCEDURE — 93017 CV STRESS TEST TRACING ONLY: CPT

## 2024-05-20 RX ORDER — ALBUTEROL SULFATE 0.83 MG/ML
2.5 SOLUTION RESPIRATORY (INHALATION)
Status: DISCONTINUED | OUTPATIENT
Start: 2024-05-20 | End: 2024-05-20 | Stop reason: HOSPADM

## 2024-05-20 RX ORDER — CAFFEINE CITRATE 20 MG/ML
60 SOLUTION INTRAVENOUS
Status: DISCONTINUED | OUTPATIENT
Start: 2024-05-20 | End: 2024-05-20 | Stop reason: HOSPADM

## 2024-05-20 RX ORDER — REGADENOSON 0.08 MG/ML
0.4 INJECTION, SOLUTION INTRAVENOUS ONCE
Status: COMPLETED | OUTPATIENT
Start: 2024-05-20 | End: 2024-05-20

## 2024-05-20 RX ORDER — CAFFEINE 200 MG
200 TABLET ORAL
Status: DISCONTINUED | OUTPATIENT
Start: 2024-05-20 | End: 2024-05-20 | Stop reason: HOSPADM

## 2024-05-20 RX ORDER — AMINOPHYLLINE 25 MG/ML
50 INJECTION, SOLUTION INTRAVENOUS
Status: DISCONTINUED | OUTPATIENT
Start: 2024-05-20 | End: 2024-05-20 | Stop reason: HOSPADM

## 2024-05-20 RX ADMIN — REGADENOSON 0.4 MG: 0.08 INJECTION, SOLUTION INTRAVENOUS at 10:15

## 2024-05-20 RX ADMIN — Medication 7.8 MILLICURIE: at 09:40

## 2024-05-20 RX ADMIN — AMINOPHYLLINE 50 MG: 25 INJECTION, SOLUTION INTRAVENOUS at 10:20

## 2024-05-20 RX ADMIN — Medication 31.1 MILLICURIE: at 10:30

## 2024-08-05 ENCOUNTER — OFFICE VISIT (OUTPATIENT)
Dept: PHARMACY | Facility: PHYSICIAN GROUP | Age: 79
End: 2024-08-05
Payer: COMMERCIAL

## 2024-08-05 DIAGNOSIS — M81.0 AGE-RELATED OSTEOPOROSIS WITHOUT CURRENT PATHOLOGICAL FRACTURE: Primary | ICD-10-CM

## 2024-08-05 PROCEDURE — 99607 MTMS BY PHARM ADDL 15 MIN: CPT | Performed by: PHARMACIST

## 2024-08-05 PROCEDURE — 99606 MTMS BY PHARM EST 15 MIN: CPT | Performed by: PHARMACIST

## 2024-08-05 RX ORDER — ALENDRONATE SODIUM 70 MG/1
70 TABLET ORAL
COMMUNITY

## 2024-08-05 NOTE — PATIENT INSTRUCTIONS
"Recommendations from today's MTM visit:                                                         We will make a lab appointment to make sure everything is going well to start alendronate 70 mg     Once we have the results, I will send in a prescription for alendronate 70 mg. This is taken once weekly on an empty stomach. Make sure to sit up right for 30 minutes to an hour after taking.    Follow-up: Return in 29 days (on 9/3/2024) for Follow up, in person @ 2:00PM .    It was great speaking with you today.  I value your experience and would be very thankful for your time in providing feedback in our clinic survey. In the next few days, you may receive an email or text message from [employer] with a link to a survey related to your  clinical pharmacist.\"     To schedule another MTM appointment, please call the clinic directly or you may call the MTM scheduling line at [phone].    My Clinical Pharmacist's contact information:                                                      Please feel free to contact me with any questions or concerns you have.      Rc Funk, Karly, ANNA, BCACP  Medication Therapy Management Pharmacist    "

## 2024-08-05 NOTE — PROGRESS NOTES
Medication Therapy Management (MTM) Encounter    ASSESSMENT:                            Medication Adherence/Access: No issues identified    Bone Health   Osteoporosis:   Patient should be on treatment for osteoporosis. Recommend alendronate 70 mg weekly. She states she has not tried anything previously. Would need labs prior to sending in new prescription, so we set patient up for CMP for tomorrow and will send in alendronate to patient after that.    PLAN:                            We will make a lab appointment to make sure everything is going well to start alendronate 70 mg      Once we have the results, I will send in a prescription for alendronate 70 mg. This is taken once weekly on an empty stomach. Make sure to sit up right for 30 minutes to an hour after taking.     Follow-up: Return in 29 days (on 9/3/2024) for Follow up, in person @ 2:00PM .    Rc Funk PharmD, ANNA, BCACP  Medication Therapy Management Pharmacist     SUBJECTIVE/OBJECTIVE:                          Milly Weinberg is a 79 year old female seen for a follow-up visit.       Reason for visit: Osteoporosis consult.    Allergies/ADRs: Reviewed in chart  Past Medical History: Reviewed in chart  Tobacco: She reports that she has quit smoking. Her smoking use included cigarettes. She has never used smokeless tobacco.  Alcohol: none      Medication Adherence/Access: no issues reported    Bone Health   Osteoporosis:   calcium 500 mg twice daily  Vitamin D 1000 units daily  Patient is not experiencing side effects.  DEXA History: 2/01/2024: T-Score of -3.7 measured at spine, -3.1 measured at femur,   Risk factors: post-menopausal       Today's Vitals: There were no vitals taken for this visit.  ----------------      I spent 20 minutes with this patient today. All changes were made via collaborative practice agreement with Greer Lay MD. A copy of the visit note was provided to the patient's provider(s).    A summary of these  recommendations was given to the patient.    Rc Funk, PharmD, ANNA, BCACP  Medication Therapy Management Pharmacist          Medication Therapy Recommendations  Age-related osteoporosis without current pathological fracture    Rationale: Synergistic therapy - Needs additional medication therapy - Indication   Recommendation: Start Medication - alendronate 70 MG tablet   Status: Accepted per CPA

## 2024-08-06 ENCOUNTER — LAB REQUISITION (OUTPATIENT)
Dept: LAB | Facility: CLINIC | Age: 79
End: 2024-08-06
Payer: COMMERCIAL

## 2024-08-06 DIAGNOSIS — M81.0 AGE-RELATED OSTEOPOROSIS WITHOUT CURRENT PATHOLOGICAL FRACTURE: ICD-10-CM

## 2024-08-06 PROCEDURE — 80053 COMPREHEN METABOLIC PANEL: CPT | Performed by: STUDENT IN AN ORGANIZED HEALTH CARE EDUCATION/TRAINING PROGRAM

## 2024-08-07 LAB
ALBUMIN SERPL BCG-MCNC: 4 G/DL (ref 3.5–5.2)
ALP SERPL-CCNC: 81 U/L (ref 40–150)
ALT SERPL W P-5'-P-CCNC: 11 U/L (ref 0–50)
ANION GAP SERPL CALCULATED.3IONS-SCNC: 10 MMOL/L (ref 7–15)
AST SERPL W P-5'-P-CCNC: 19 U/L (ref 0–45)
BILIRUB SERPL-MCNC: 0.4 MG/DL
BUN SERPL-MCNC: 16.7 MG/DL (ref 8–23)
CALCIUM SERPL-MCNC: 9.5 MG/DL (ref 8.8–10.4)
CHLORIDE SERPL-SCNC: 105 MMOL/L (ref 98–107)
CREAT SERPL-MCNC: 0.9 MG/DL (ref 0.51–0.95)
EGFRCR SERPLBLD CKD-EPI 2021: 66 ML/MIN/1.73M2
GLUCOSE SERPL-MCNC: 131 MG/DL (ref 70–99)
HCO3 SERPL-SCNC: 30 MMOL/L (ref 22–29)
POTASSIUM SERPL-SCNC: 4.9 MMOL/L (ref 3.4–5.3)
PROT SERPL-MCNC: 6.1 G/DL (ref 6.4–8.3)
SODIUM SERPL-SCNC: 145 MMOL/L (ref 135–145)

## 2024-08-08 ENCOUNTER — HOSPITAL ENCOUNTER (EMERGENCY)
Facility: CLINIC | Age: 79
Discharge: HOME | End: 2024-08-09
Attending: EMERGENCY MEDICINE | Admitting: EMERGENCY MEDICINE
Payer: COMMERCIAL

## 2024-08-08 ENCOUNTER — APPOINTMENT (OUTPATIENT)
Dept: CT IMAGING | Facility: CLINIC | Age: 79
End: 2024-08-08
Attending: EMERGENCY MEDICINE
Payer: COMMERCIAL

## 2024-08-08 ENCOUNTER — APPOINTMENT (OUTPATIENT)
Dept: ULTRASOUND IMAGING | Facility: CLINIC | Age: 79
End: 2024-08-08
Attending: EMERGENCY MEDICINE
Payer: COMMERCIAL

## 2024-08-08 DIAGNOSIS — K22.89 ESOPHAGEAL CYST: ICD-10-CM

## 2024-08-08 DIAGNOSIS — J44.1 COPD EXACERBATION (H): ICD-10-CM

## 2024-08-08 DIAGNOSIS — I82.412 ACUTE DEEP VEIN THROMBOSIS (DVT) OF FEMORAL VEIN OF LEFT LOWER EXTREMITY (H): ICD-10-CM

## 2024-08-08 LAB
ANION GAP SERPL CALCULATED.3IONS-SCNC: 7 MMOL/L (ref 7–15)
ATRIAL RATE - MUSE: 76 BPM
BASOPHILS # BLD AUTO: 0 10E3/UL (ref 0–0.2)
BASOPHILS NFR BLD AUTO: 1 %
BUN SERPL-MCNC: 21.1 MG/DL (ref 8–23)
CALCIUM SERPL-MCNC: 9.6 MG/DL (ref 8.8–10.4)
CHLORIDE SERPL-SCNC: 105 MMOL/L (ref 98–107)
CREAT SERPL-MCNC: 1.03 MG/DL (ref 0.51–0.95)
D DIMER PPP FEU-MCNC: 0.87 UG/ML FEU (ref 0–0.5)
DIASTOLIC BLOOD PRESSURE - MUSE: 81 MMHG
EGFRCR SERPLBLD CKD-EPI 2021: 56 ML/MIN/1.73M2
EOSINOPHIL # BLD AUTO: 0.3 10E3/UL (ref 0–0.7)
EOSINOPHIL NFR BLD AUTO: 4 %
ERYTHROCYTE [DISTWIDTH] IN BLOOD BY AUTOMATED COUNT: 12.4 % (ref 10–15)
FLUAV RNA SPEC QL NAA+PROBE: NEGATIVE
FLUBV RNA RESP QL NAA+PROBE: NEGATIVE
GLUCOSE SERPL-MCNC: 125 MG/DL (ref 70–99)
HCO3 SERPL-SCNC: 31 MMOL/L (ref 22–29)
HCT VFR BLD AUTO: 44.3 % (ref 35–47)
HGB BLD-MCNC: 14.4 G/DL (ref 11.7–15.7)
IMM GRANULOCYTES # BLD: 0 10E3/UL
IMM GRANULOCYTES NFR BLD: 0 %
INTERPRETATION ECG - MUSE: NORMAL
LYMPHOCYTES # BLD AUTO: 1.7 10E3/UL (ref 0.8–5.3)
LYMPHOCYTES NFR BLD AUTO: 19 %
MCH RBC QN AUTO: 32.3 PG (ref 26.5–33)
MCHC RBC AUTO-ENTMCNC: 32.5 G/DL (ref 31.5–36.5)
MCV RBC AUTO: 99 FL (ref 78–100)
MONOCYTES # BLD AUTO: 0.8 10E3/UL (ref 0–1.3)
MONOCYTES NFR BLD AUTO: 9 %
NEUTROPHILS # BLD AUTO: 6 10E3/UL (ref 1.6–8.3)
NEUTROPHILS NFR BLD AUTO: 68 %
NRBC # BLD AUTO: 0 10E3/UL
NRBC BLD AUTO-RTO: 0 /100
NT-PROBNP SERPL-MCNC: 465 PG/ML (ref 0–1800)
P AXIS - MUSE: 67 DEGREES
PLATELET # BLD AUTO: 205 10E3/UL (ref 150–450)
POTASSIUM SERPL-SCNC: 4.1 MMOL/L (ref 3.4–5.3)
PR INTERVAL - MUSE: 164 MS
QRS DURATION - MUSE: 88 MS
QT - MUSE: 396 MS
QTC - MUSE: 445 MS
R AXIS - MUSE: -62 DEGREES
RBC # BLD AUTO: 4.46 10E6/UL (ref 3.8–5.2)
RSV RNA SPEC NAA+PROBE: NEGATIVE
SARS-COV-2 RNA RESP QL NAA+PROBE: NEGATIVE
SODIUM SERPL-SCNC: 143 MMOL/L (ref 135–145)
SYSTOLIC BLOOD PRESSURE - MUSE: 195 MMHG
T AXIS - MUSE: 59 DEGREES
TROPONIN T SERPL HS-MCNC: 8 NG/L
TROPONIN T SERPL HS-MCNC: 9 NG/L
VENTRICULAR RATE- MUSE: 76 BPM
WBC # BLD AUTO: 8.8 10E3/UL (ref 4–11)

## 2024-08-08 PROCEDURE — 250N000013 HC RX MED GY IP 250 OP 250 PS 637: Performed by: EMERGENCY MEDICINE

## 2024-08-08 PROCEDURE — 85025 COMPLETE CBC W/AUTO DIFF WBC: CPT | Performed by: EMERGENCY MEDICINE

## 2024-08-08 PROCEDURE — 94640 AIRWAY INHALATION TREATMENT: CPT

## 2024-08-08 PROCEDURE — 250N000011 HC RX IP 250 OP 636: Performed by: EMERGENCY MEDICINE

## 2024-08-08 PROCEDURE — 99285 EMERGENCY DEPT VISIT HI MDM: CPT | Mod: 25

## 2024-08-08 PROCEDURE — 36415 COLL VENOUS BLD VENIPUNCTURE: CPT | Performed by: EMERGENCY MEDICINE

## 2024-08-08 PROCEDURE — 250N000009 HC RX 250: Performed by: EMERGENCY MEDICINE

## 2024-08-08 PROCEDURE — 80048 BASIC METABOLIC PNL TOTAL CA: CPT | Performed by: EMERGENCY MEDICINE

## 2024-08-08 PROCEDURE — 84484 ASSAY OF TROPONIN QUANT: CPT | Performed by: EMERGENCY MEDICINE

## 2024-08-08 PROCEDURE — 93971 EXTREMITY STUDY: CPT | Mod: RT

## 2024-08-08 PROCEDURE — 999N000157 HC STATISTIC RCP TIME EA 10 MIN

## 2024-08-08 PROCEDURE — 96374 THER/PROPH/DIAG INJ IV PUSH: CPT | Mod: 59

## 2024-08-08 PROCEDURE — 83880 ASSAY OF NATRIURETIC PEPTIDE: CPT | Performed by: EMERGENCY MEDICINE

## 2024-08-08 PROCEDURE — 87637 SARSCOV2&INF A&B&RSV AMP PRB: CPT | Performed by: EMERGENCY MEDICINE

## 2024-08-08 PROCEDURE — 71275 CT ANGIOGRAPHY CHEST: CPT

## 2024-08-08 PROCEDURE — 85379 FIBRIN DEGRADATION QUANT: CPT | Performed by: EMERGENCY MEDICINE

## 2024-08-08 PROCEDURE — 93005 ELECTROCARDIOGRAM TRACING: CPT | Performed by: EMERGENCY MEDICINE

## 2024-08-08 RX ORDER — IPRATROPIUM BROMIDE AND ALBUTEROL SULFATE 2.5; .5 MG/3ML; MG/3ML
3 SOLUTION RESPIRATORY (INHALATION) ONCE
Status: COMPLETED | OUTPATIENT
Start: 2024-08-08 | End: 2024-08-08

## 2024-08-08 RX ORDER — IOPAMIDOL 755 MG/ML
75 INJECTION, SOLUTION INTRAVASCULAR ONCE
Status: COMPLETED | OUTPATIENT
Start: 2024-08-08 | End: 2024-08-08

## 2024-08-08 RX ORDER — PREDNISONE 20 MG/1
TABLET ORAL
Qty: 10 TABLET | Refills: 0 | Status: SHIPPED | OUTPATIENT
Start: 2024-08-08

## 2024-08-08 RX ORDER — METHYLPREDNISOLONE SODIUM SUCCINATE 125 MG/2ML
125 INJECTION, POWDER, LYOPHILIZED, FOR SOLUTION INTRAMUSCULAR; INTRAVENOUS ONCE
Status: COMPLETED | OUTPATIENT
Start: 2024-08-08 | End: 2024-08-08

## 2024-08-08 RX ORDER — APIXABAN 5 MG (74)
KIT ORAL
Qty: 74 EACH | Refills: 0 | Status: SHIPPED | OUTPATIENT
Start: 2024-08-08 | End: 2024-09-07

## 2024-08-08 RX ORDER — PREDNISONE 20 MG/1
TABLET ORAL
Qty: 10 TABLET | Refills: 0 | Status: SHIPPED | OUTPATIENT
Start: 2024-08-08 | End: 2024-08-08

## 2024-08-08 RX ORDER — APIXABAN 5 MG (74)
KIT ORAL
Qty: 74 EACH | Refills: 0 | Status: SHIPPED | OUTPATIENT
Start: 2024-08-08 | End: 2024-08-08

## 2024-08-08 RX ADMIN — IPRATROPIUM BROMIDE AND ALBUTEROL SULFATE 3 ML: .5; 3 SOLUTION RESPIRATORY (INHALATION) at 20:50

## 2024-08-08 RX ADMIN — IOPAMIDOL 75 ML: 755 INJECTION, SOLUTION INTRAVENOUS at 21:36

## 2024-08-08 RX ADMIN — METHYLPREDNISOLONE SODIUM SUCCINATE 125 MG: 125 INJECTION, POWDER, FOR SOLUTION INTRAMUSCULAR; INTRAVENOUS at 21:27

## 2024-08-08 RX ADMIN — APIXABAN 10 MG: 5 TABLET, FILM COATED ORAL at 22:41

## 2024-08-08 ASSESSMENT — ACTIVITIES OF DAILY LIVING (ADL)
ADLS_ACUITY_SCORE: 35

## 2024-08-08 ASSESSMENT — COLUMBIA-SUICIDE SEVERITY RATING SCALE - C-SSRS
1. IN THE PAST MONTH, HAVE YOU WISHED YOU WERE DEAD OR WISHED YOU COULD GO TO SLEEP AND NOT WAKE UP?: NO
6. HAVE YOU EVER DONE ANYTHING, STARTED TO DO ANYTHING, OR PREPARED TO DO ANYTHING TO END YOUR LIFE?: NO
2. HAVE YOU ACTUALLY HAD ANY THOUGHTS OF KILLING YOURSELF IN THE PAST MONTH?: NO

## 2024-08-08 ASSESSMENT — ENCOUNTER SYMPTOMS
PALPITATIONS: 1
SHORTNESS OF BREATH: 1

## 2024-08-09 VITALS
DIASTOLIC BLOOD PRESSURE: 67 MMHG | OXYGEN SATURATION: 96 % | BODY MASS INDEX: 22.08 KG/M2 | HEIGHT: 62 IN | SYSTOLIC BLOOD PRESSURE: 144 MMHG | RESPIRATION RATE: 28 BRPM | HEART RATE: 93 BPM | WEIGHT: 120 LBS | TEMPERATURE: 98.4 F

## 2024-08-09 NOTE — ED PROVIDER NOTES
EMERGENCY DEPARTMENT ENCOUNTER      NAME: Sharon Weinberg  AGE: 79 year old female  YOB: 1945  MRN: [id number]  EVALUATION DATE & TIME: 8/8/2024  7:24 PM    PCP: Greer Lay    ED PROVIDER: Thaddeus Abraham M.D.      Chief Complaint   Patient presents with    Shortness of Breath         FINAL IMPRESSION:  1. Esophageal cyst    2. Acute deep vein thrombosis (DVT) of femoral vein of left lower extremity (H)    3. COPD exacerbation (H)          ED COURSE & MEDICAL DECISION MAKING:    Pertinent Labs & Imaging studies reviewed. (See chart for details)  79 year old female presents to the Emergency Department for evaluation of shortness of breath    Well-appearing on exam    ED Course as of 08/08/24 2234   Thu Aug 08, 2024   2011 Shortness panic attack.  Patient reports history of COPD.  Has albuterol Hailer as needed.  Feels much better after the neb EMS gave her.  On exam well-appearing but does have some wheezing left greater than right.  Likely COPD exacerbation but I will consider pneumonia, pneumothorax, heart failure, pulm emboli, ACS, COVID-19   2011 Plan For COVID-19 testing, troponin, BNP, D-dimer, proBNP, troponin, CBC, chest imaging, DuoNeb, Solu-Medrol   2205 Nursing reports patient became hypoxic with ambulation and took a while to recover therefore placed on oxygen   2205 SARS CoV2 PCR: Negative   2205 Resp Syncytial Virus: Negative   2205 Influenza B: Negative   2205 Influenza A: Negative   2205 D-Dimer Quantitative(!): 0.87  CTA PE ordered   2205 Troponin T, High Sensitivity: 8  Will obtain delta troponin   2205 N-Terminal Pro BNP Inpatient: 465  CHF less likely   2205 Patient was commenting a lot of cramping to her right leg liver ultrasound ordered in setting of elevated D-dimer   2205 WBC: 8.8   2205 Hemoglobin: 14.4   2205 Platelet Count: 205   2205 Sodium: 143   2206 Creatinine(!): 1.03   2212 Patient reports she does not feel short of breath with walking around.  Her oxygen did  fall but she does not feel short of breath.  CTA PE does not show evidence of pulmonary emboli.  Does have emphysema.  Given Solu-Medrol and DuoNebs improvement in symptoms   2213 Small DVT on ultrasound will put on anticoag   2218 Patient is off of oxygen.  She says she feels back to baseline breathing wise.  Offer admission for observation.  Patient is comfortable going home and wants to go home.  Discussed the esophageal cyst and need for follow-up with primary care doctor.  Will send him with prednisone for 5 days.  Will start on Eliquis   2220 Patient states no active bleeding or recent surgery   Elevated age-adjusted D-dimer will obtain CTA PE    Patient signed out to Dr. Hayes pending delta troponin if stable plan for discharge home with Eliquis for DVT and prednisone for COPD exacerbation    8:11 PM I met with the patient, obtained history, performed an initial exam, and discussed options and plan for diagnostics and treatment here in the ED.     Medical Decision Making  Obtained supplemental history:Supplemental history obtained?: Documented in chart  Reviewed external records: External records reviewed?: Documented in chart and Prior Imaging: Cardiac stress test, 5/20/2024  Care impacted by chronic illness:Hyperlipidemia, Hypertension, and Other: COPD  Care significantly affected by social determinants of health:Access to Medical Care  Did you consider but not order tests?: Work up considered but not performed and documented in chart, if applicable  Did you interpret images independently?: Independent interpretation of ECG and images noted in documentation, when applicable.  Consultation discussion with other provider:Did you involve another provider (consultant, MH, pharmacy, etc.)?: I discussed the care with another health care provider, see documentation for details.  Admission considered. Patient was signed out to the oncoming physician, disposition pending.    At the conclusion of the encounter I  "discussed the results of all of the tests and the disposition. The questions were answered. The patient or family acknowledged understanding and was agreeable with the care plan.     PE: The patient had an abnormal d-dimer.      MEDICATIONS GIVEN IN THE EMERGENCY:  Medications   apixaban ANTICOAGULANT (ELIQUIS) tablet 10 mg (has no administration in time range)   ipratropium - albuterol 0.5 mg/2.5 mg/3 mL (DUONEB) neb solution 3 mL (3 mLs Nebulization $Given 8/8/24 2050)   methylPREDNISolone sodium succinate (solu-MEDROL) injection 125 mg (125 mg Intravenous $Given 8/8/24 2127)   iopamidol (ISOVUE-370) solution 75 mL (75 mLs Intravenous $Given 8/8/24 2136)       NEW PRESCRIPTIONS STARTED AT TODAY'S ER VISIT  New Prescriptions    APIXABAN STARTER PACK (ELIQUIS DVT/PE STARTER PACK) 5 MG TBPK    Take 10 mg by mouth 2 times daily for 7 days, THEN 5 mg 2 times daily for 23 days.    PREDNISONE (DELTASONE) 20 MG TABLET    Take two tablets (= 40mg) each day for 5 (five) days          =================================================================    HPI    Patient information was obtained from: patient     Use of : N/A      Sharon Weinberg is a 79 year old female with a pertinent history of hyperlipidemia, hypertension, COPD, former smoker who presents to this ED by EMS for evaluation of shortness of breath.     The patient reports sitting in her chair, when her heart felt like it was \"racing.\" She said her nurse stopped to check on her, and was concerned that she wasn't breathing well. Patient noted that she was anxious about this, and that it may have caused her heart to feel like it was racing. She was given 1 duoneb en route, she said this helped. Patient has an inhaler at home, use for when needed.     Patient denies history of asthma, blood clots or heart problems. No recent sickness.     Right calf cramp    REVIEW OF SYSTEMS   Review of Systems   Respiratory:  Positive for shortness of breath.  "   Cardiovascular:  Positive for palpitations and leg swelling.        PAST MEDICAL HISTORY:  History reviewed. No pertinent past medical history.    PAST SURGICAL HISTORY:  History reviewed. No pertinent surgical history.        CURRENT MEDICATIONS:    Apixaban Starter Pack (ELIQUIS DVT/PE STARTER PACK) 5 MG TBPK  predniSONE (DELTASONE) 20 MG tablet  acetaminophen (TYLENOL) 325 MG tablet  albuterol (PROAIR HFA/PROVENTIL HFA/VENTOLIN HFA) 108 (90 Base) MCG/ACT inhaler  alendronate (FOSAMAX) 70 MG tablet  calcium carbonate 500 mg, elemental, (OSCAL) 500 MG tablet  carboxymethylcellulose PF (REFRESH PLUS) 0.5 % ophthalmic solution  cycloSPORINE (RESTASIS) 0.05 % ophthalmic emulsion  escitalopram (LEXAPRO) 10 MG tablet  Flaxseed, Linseed, (FLAX SEEDS) POWD  fluticasone-salmeterol (AIRDUO RESPICLICK) 113-14 MCG/ACT inhaler  LORazepam (ATIVAN) 0.5 MG tablet  mirtazapine (REMERON) 30 MG tablet  pramipexole (MIRAPEX) 1 MG tablet  propranolol (INDERAL) 20 MG tablet  Vitamin D3 (VITAMIN D, CHOLECALCIFEROL,) 25 mcg (1000 units) tablet        ALLERGIES:  Allergies   Allergen Reactions    Penicillins Palpitations, Anaphylaxis, Hives and Difficulty breathing    Cephalexin Hives    Doxycycline Itching and Rash    Sulfa Antibiotics Hives    Sulfatolamide Hives       FAMILY HISTORY:  Family History   Problem Relation Age of Onset    Coronary Artery Disease Father 90    Heart Disease Sister     Ovarian Cancer Daughter     Hereditary Breast and Ovarian Cancer Syndrome Daughter        SOCIAL HISTORY:   Social History     Socioeconomic History    Marital status:    Tobacco Use    Smoking status: Former     Types: Cigarettes    Smokeless tobacco: Never     Social Determinants of Health      [facility] [employer] [facility]    Financial Resource Strain    Received from [employer] [facility], [employer] [facility]     "Social Connections       VITALS:  /63   Pulse 79   Temp 98.4  F (36.9  C) (Oral)   Resp 23   Ht 1.575 m (5' 2\")   Wt 54.4 kg (120 lb)   SpO2 97%   BMI 21.95 kg/m      PHYSICAL EXAM      Vitals: /63   Pulse 79   Temp 98.4  F (36.9  C) (Oral)   Resp 23   Ht 1.575 m (5' 2\")   Wt 54.4 kg (120 lb)   SpO2 97%   BMI 21.95 kg/m    General: Appears in no acute distress, awake, alert, interactive.  Eyes: Conjunctivae non-injected. Sclera anicteric.  HENT: Atraumatic.  Neck: Supple.  Respiratory/Chest: Wheezing with exhalation, L>R.   Abdomen: non distended  Musculoskeletal: Normal extremities. No erythema. Mild pitting edema bilaterally.   Skin: Normal color. No rash or diaphoresis.  Neurologic: Face symmetric, moves all extremities spontaneously. Speech clear.  Psychiatric: Oriented to person, place, and time. Affect appropriate.    LAB:  All pertinent labs reviewed and interpreted.  Results for orders placed or performed during the hospital encounter of 08/08/24   CT Chest Pulmonary Embolism w Contrast    Impression    IMPRESSION:  1.  No pulmonary emboli.  2.  Emphysema.  3.  Pulmonary arteries are prominent in size. Correlate for pulmonary hypertension.  4.  2.9 cm cystic lesion posterior mediastinum near distal esophagus. 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 Comparison to any prior imaging recommended.   US Lower Extremity Venous Duplex Right    Impression    IMPRESSION:    1.  Short segment DVT in a soleal vein in the mid calf. This was reported to Dr. Abraham at 10:11 PM on 08/08/2024.   Basic metabolic panel   Result Value Ref Range    Sodium 143 135 - 145 mmol/L    Potassium 4.1 3.4 - 5.3 mmol/L    Chloride 105 98 - 107 mmol/L    Carbon Dioxide (CO2) 31 (H) 22 - 29 mmol/L    Anion Gap 7 7 - 15 mmol/L    Urea Nitrogen 21.1 8.0 - 23.0 mg/dL    Creatinine 1.03 (H) 0.51 - 0.95 mg/dL    GFR Estimate 56 (L) >60 mL/min/1.73m2    Calcium 9.6 8.8 - 10.4 mg/dL    Glucose 125 (H) 70 - 99 mg/dL   Result Value " Ref Range    Troponin T, High Sensitivity 8 <=14 ng/L   Nt probnp inpatient (BNP)   Result Value Ref Range    N terminal Pro BNP Inpatient 465 0 - 1,800 pg/mL   D dimer quantitative   Result Value Ref Range    D-Dimer Quantitative 0.87 (H) 0.00 - 0.50 ug/mL FEU   Symptomatic Influenza A/B, RSV, & SARS-CoV2 PCR (COVID-19) Nasopharyngeal    Specimen: Nasopharyngeal; Swab   Result Value Ref Range    Influenza A PCR Negative Negative    Influenza B PCR Negative Negative    RSV PCR Negative Negative    SARS CoV2 PCR Negative Negative   CBC with platelets and differential   Result Value Ref Range    WBC Count 8.8 4.0 - 11.0 10e3/uL    RBC Count 4.46 3.80 - 5.20 10e6/uL    Hemoglobin 14.4 11.7 - 15.7 g/dL    Hematocrit 44.3 35.0 - 47.0 %    MCV 99 78 - 100 fL    MCH 32.3 26.5 - 33.0 pg    MCHC 32.5 31.5 - 36.5 g/dL    RDW 12.4 10.0 - 15.0 %    Platelet Count 205 150 - 450 10e3/uL    % Neutrophils 68 %    % Lymphocytes 19 %    % Monocytes 9 %    % Eosinophils 4 %    % Basophils 1 %    % Immature Granulocytes 0 %    NRBCs per 100 WBC 0 <1 /100    Absolute Neutrophils 6.0 1.6 - 8.3 10e3/uL    Absolute Lymphocytes 1.7 0.8 - 5.3 10e3/uL    Absolute Monocytes 0.8 0.0 - 1.3 10e3/uL    Absolute Eosinophils 0.3 0.0 - 0.7 10e3/uL    Absolute Basophils 0.0 0.0 - 0.2 10e3/uL    Absolute Immature Granulocytes 0.0 <=0.4 10e3/uL    Absolute NRBCs 0.0 10e3/uL   ECG 12-LEAD WITH MUSE (LHE)   Result Value Ref Range    Systolic Blood Pressure 195 mmHg    Diastolic Blood Pressure 81 mmHg    Ventricular Rate 76 BPM    Atrial Rate 76 BPM    PA Interval 164 ms    QRS Duration 88 ms     ms    QTc 445 ms    P Axis 67 degrees    R AXIS -62 degrees    T Axis 59 degrees    Interpretation ECG       Sinus rhythm  Possible Left atrial enlargement  Left anterior fascicular block  Left ventricular hypertrophy  Abnormal ECG  When compared with ECG of 16-Jun-2005 23:23,  No significant change was found  Confirmed by SEE ED PROVIDER NOTE FOR, ECG  INTERPRETATION ([id number]),  MARIA G BRUCE ([id number]) on 8/8/2024 8:26:58 PM         RADIOLOGY:  Reviewed all pertinent imaging. Please see official radiology report.  US Lower Extremity Venous Duplex Right   Final Result   IMPRESSION:      1.  Short segment DVT in a soleal vein in the mid calf. This was reported to Dr. Abraham at 10:11 PM on 08/08/2024.      CT Chest Pulmonary Embolism w Contrast   Final Result   IMPRESSION:   1.  No pulmonary emboli.   2.  Emphysema.   3.  Pulmonary arteries are prominent in size. Correlate for pulmonary hypertension.   4.  2.9 cm cystic lesion posterior mediastinum near distal esophagus. 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 Comparison to any prior imaging recommended.          EKG:    Performed at: 19:31, 8/8/2024    Impression: Sinus rhythm, possible left atrial enlargement. Left ventricular hypertrophy. Abnormal ECG.     Rate: 76 bpm  Rhythm: Sinus  Axis: -62  ME Interval: 164 ms  QRS Interval: 88 ms  QTc Interval: 445 ms  ST Changes: No significant change found   Comparison: Compared with EKG from 6/16/2005    I have independently reviewed and interpreted the EKG(s) documented above.        I, Rosemarie Swift, am serving as a scribe to document services personally performed by Dr. Thaddeus Abraham, based on my observation and the provider's statements to me. I, Thaddeus Abraham MD, attest that Rosemarie Swift is acting in a scribe capacity, has observed my performance of the services and has documented them in accordance with my direction.    Thaddeus Abraham M.D.  [facility] EMERGENCY ROOM  [address] [id number] [phone]      Thaddeus Abraham MD  08/08/24 2944

## 2024-08-09 NOTE — ED TRIAGE NOTES
Pt arrives to ed with c/o of sob that started when pt was walking back from dinner at her assisted living facility. Pt denies and cp, nausea, gu, or gi symptoms. Was given 1 duoneb via ems     Triage Assessment (Adult)       Row Name 08/08/24 1934          Triage Assessment    Airway WDL WDL        Respiratory WDL    Respiratory WDL X;rhythm/pattern     Rhythm/Pattern, Respiratory shortness of breath        Cardiac WDL    Cardiac WDL WDL        Cognitive/Neuro/Behavioral WDL    Cognitive/Neuro/Behavioral WDL WDL

## 2024-08-09 NOTE — ED NOTES
"EMERGENCY DEPARTMENT SIGN OUT NOTE        ED COURSE AND MEDICAL DECISION MAKING  10:50 PM Patient was signed out to me by Dr Javy Abraham     In brief, Sharon Weinberg is a 79 year old female who initially presented  shortness of breath.      The patient reports sitting in her chair, when her heart felt like it was \"racing.\" She said her nurse stopped to check on her, and was concerned that she wasn't breathing well. Patient noted that she was anxious about this, and that it may have caused her heart to feel like it was racing. She was given 1 duoneb en route, she said this helped. Patient has an inhaler at home, use for when needed. Patient denies history of asthma, blood clots or heart problems. No recent sickness.     At time of sign out, disposition was pending repeat troponin.     11:32 PM patient's repeat troponin came back unchanged and negative.  Patient had been discussed for admission however does not wish to stay and again reiterates she wishes to go home.  Patient already started on Eliquis will plan for discharge home with Eliquis and prednisone.  Following repeat troponin which was negative patient will be discharged.    FINAL IMPRESSION    1. Esophageal cyst    2. Acute deep vein thrombosis (DVT) of femoral vein of left lower extremity (H)    3. COPD exacerbation (H)        ED MEDS  Medications   ipratropium - albuterol 0.5 mg/2.5 mg/3 mL (DUONEB) neb solution 3 mL (3 mLs Nebulization $Given 8/8/24 2050)   methylPREDNISolone sodium succinate (solu-MEDROL) injection 125 mg (125 mg Intravenous $Given 8/8/24 2127)   iopamidol (ISOVUE-370) solution 75 mL (75 mLs Intravenous $Given 8/8/24 2136)   apixaban ANTICOAGULANT (ELIQUIS) tablet 10 mg (10 mg Oral $Given 8/8/24 2241)       LAB  Labs Ordered and Resulted from Time of ED Arrival to Time of ED Departure   BASIC METABOLIC PANEL - Abnormal       Result Value    Sodium 143      Potassium 4.1      Chloride 105      Carbon Dioxide (CO2) 31 (*)     Anion Gap " 7      Urea Nitrogen 21.1      Creatinine 1.03 (*)     GFR Estimate 56 (*)     Calcium 9.6      Glucose 125 (*)    D DIMER QUANTITATIVE - Abnormal    D-Dimer Quantitative 0.87 (*)    TROPONIN T, HIGH SENSITIVITY - Normal    Troponin T, High Sensitivity 8     TROPONIN T, HIGH SENSITIVITY - Normal    Troponin T, High Sensitivity 9     NT PROBNP INPATIENT - Normal    N terminal Pro BNP Inpatient 465     INFLUENZA A/B, RSV, & SARS-COV2 PCR - Normal    Influenza A PCR Negative      Influenza B PCR Negative      RSV PCR Negative      SARS CoV2 PCR Negative     CBC WITH PLATELETS AND DIFFERENTIAL    WBC Count 8.8      RBC Count 4.46      Hemoglobin 14.4      Hematocrit 44.3      MCV 99      MCH 32.3      MCHC 32.5      RDW 12.4      Platelet Count 205      % Neutrophils 68      % Lymphocytes 19      % Monocytes 9      % Eosinophils 4      % Basophils 1      % Immature Granulocytes 0      NRBCs per 100 WBC 0      Absolute Neutrophils 6.0      Absolute Lymphocytes 1.7      Absolute Monocytes 0.8      Absolute Eosinophils 0.3      Absolute Basophils 0.0      Absolute Immature Granulocytes 0.0      Absolute NRBCs 0.0       RADIOLOGY    US Lower Extremity Venous Duplex Right   Final Result   IMPRESSION:      1.  Short segment DVT in a soleal vein in the mid calf. This was reported to Dr. Abraham at 10:11 PM on 08/08/2024.      CT Chest Pulmonary Embolism w Contrast   Final Result   IMPRESSION:   1.  No pulmonary emboli.   2.  Emphysema.   3.  Pulmonary arteries are prominent in size. Correlate for pulmonary hypertension.   4.  2.9 cm cystic lesion posterior mediastinum near distal esophagus. 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 Comparison to any prior imaging recommended.          DISCHARGE MEDS  Current Discharge Medication List        START taking these medications    Details   Apixaban Starter Pack (ELIQUIS DVT/PE STARTER PACK) 5 MG TBPK Take 10 mg by mouth 2 times daily for 7 days, THEN 5 mg 2 times daily for 23 days.  Qty: 74  each, Refills: 0      predniSONE (DELTASONE) 20 MG tablet Take two tablets (= 40mg) each day for 5 (five) days  Qty: 10 tablet, Refills: 0             Raghu Hayes MD  [facility] EMERGENCY ROOM  [address] [place] [id number] [phone]       Raghu Hayes MD  08/08/24 4593

## 2024-08-09 NOTE — ED NOTES
Bed: [facility]10  Expected date:   Expected time:   Means of arrival: Ambulance  Comments:  [facility]  78 y/o F  COPD  SPO2: 90's  Duoneb given   VSS

## 2024-08-09 NOTE — ED NOTES
"Pt requested to walk to the bathroom and back, was very short of breath, sats 87% on RA, was recovering to 88% on RA so placed on 2 Liters O2, Pt also complaining of right leg pain \" like a cramp\" informed MD and primary RN.     LEONARDO HUA RN    "

## 2024-08-09 NOTE — DISCHARGE INSTRUCTIONS
Follow-up with your primary care doctor for a possible cyst found by her esophagus to exclude cancer.  Recommend anticoagulation for your blood clot.  Recommend prednisone for 5 days for a COPD exacerbation.  Recommend 90 days of anticoagulation for your DVT.  Follow-up with your primary care doctor for the additional 60 days of medicine.

## 2024-10-03 ENCOUNTER — HOSPITAL ENCOUNTER (OUTPATIENT)
Dept: ULTRASOUND IMAGING | Facility: CLINIC | Age: 79
Discharge: HOME | End: 2024-10-03
Attending: STUDENT IN AN ORGANIZED HEALTH CARE EDUCATION/TRAINING PROGRAM | Admitting: STUDENT IN AN ORGANIZED HEALTH CARE EDUCATION/TRAINING PROGRAM
Payer: COMMERCIAL

## 2024-10-03 DIAGNOSIS — Z86.718 PERSONAL HISTORY OF DVT (DEEP VEIN THROMBOSIS): ICD-10-CM

## 2024-10-03 PROCEDURE — 93971 EXTREMITY STUDY: CPT | Mod: RT

## 2024-10-30 ENCOUNTER — LAB (OUTPATIENT)
Dept: LAB | Facility: CLINIC | Age: 79
End: 2024-10-30
Payer: COMMERCIAL

## 2024-10-30 DIAGNOSIS — G31.84 MILD COGNITIVE IMPAIRMENT: Primary | ICD-10-CM

## 2024-10-30 PROCEDURE — 36415 COLL VENOUS BLD VENIPUNCTURE: CPT

## 2024-10-30 PROCEDURE — 0346U LABORATORY MISCELLANEOUS ORDER: CPT

## 2024-10-31 LAB
PERFORMING LABORATORY: NORMAL
SPECIMEN STATUS: NORMAL
TEST NAME: NORMAL

## 2024-11-14 LAB
SCANNED LAB RESULT: NORMAL
TEST NAME: NORMAL

## 2024-11-20 ENCOUNTER — HOSPITAL ENCOUNTER (OUTPATIENT)
Dept: MAMMOGRAPHY | Facility: CLINIC | Age: 79
Discharge: HOME | End: 2024-11-20
Payer: COMMERCIAL

## 2024-11-20 DIAGNOSIS — Z12.31 VISIT FOR SCREENING MAMMOGRAM: ICD-10-CM

## 2024-11-20 PROCEDURE — 77067 SCR MAMMO BI INCL CAD: CPT

## 2024-11-20 PROCEDURE — 77063 BREAST TOMOSYNTHESIS BI: CPT

## 2024-12-18 ENCOUNTER — HOSPITAL ENCOUNTER (INPATIENT)
Facility: CLINIC | Age: 79
DRG: 175 | End: 2024-12-18
Attending: EMERGENCY MEDICINE | Admitting: HOSPITALIST
Payer: COMMERCIAL

## 2024-12-18 ENCOUNTER — APPOINTMENT (OUTPATIENT)
Dept: CT IMAGING | Facility: CLINIC | Age: 79
DRG: 175 | End: 2024-12-18
Attending: EMERGENCY MEDICINE
Payer: COMMERCIAL

## 2024-12-18 ENCOUNTER — APPOINTMENT (OUTPATIENT)
Dept: ULTRASOUND IMAGING | Facility: CLINIC | Age: 79
DRG: 175 | End: 2024-12-18
Attending: EMERGENCY MEDICINE
Payer: COMMERCIAL

## 2024-12-18 DIAGNOSIS — J96.01 ACUTE RESPIRATORY FAILURE WITH HYPOXIA (H): ICD-10-CM

## 2024-12-18 DIAGNOSIS — J44.1 COPD EXACERBATION (H): ICD-10-CM

## 2024-12-18 DIAGNOSIS — I26.99 OTHER ACUTE PULMONARY EMBOLISM WITHOUT ACUTE COR PULMONALE (H): ICD-10-CM

## 2024-12-18 PROBLEM — G31.84 MILD COGNITIVE IMPAIRMENT: Status: ACTIVE | Noted: 2024-09-24

## 2024-12-18 PROBLEM — E78.5 HYPERLIPIDEMIA: Status: ACTIVE | Noted: 2024-03-01

## 2024-12-18 PROBLEM — R26.89 BALANCE PROBLEM: Status: ACTIVE | Noted: 2024-03-01

## 2024-12-18 PROBLEM — J43.8 OTHER EMPHYSEMA (H): Status: ACTIVE | Noted: 2024-12-18

## 2024-12-18 PROBLEM — G47.00 INSOMNIA: Status: ACTIVE | Noted: 2024-12-18

## 2024-12-18 PROBLEM — R41.89 COGNITIVE CHANGES: Status: ACTIVE | Noted: 2024-03-01

## 2024-12-18 PROBLEM — Z86.718 PERSONAL HISTORY OF DVT (DEEP VEIN THROMBOSIS): Status: ACTIVE | Noted: 2024-12-18

## 2024-12-18 PROBLEM — R41.3 MEMORY LOSS: Status: ACTIVE | Noted: 2024-03-01

## 2024-12-18 LAB
ALBUMIN SERPL BCG-MCNC: 3.8 G/DL (ref 3.5–5.2)
ALP SERPL-CCNC: 86 U/L (ref 40–150)
ALT SERPL W P-5'-P-CCNC: 12 U/L (ref 0–50)
ANION GAP SERPL CALCULATED.3IONS-SCNC: 10 MMOL/L (ref 7–15)
AST SERPL W P-5'-P-CCNC: 22 U/L (ref 0–45)
ATRIAL RATE - MUSE: 70 BPM
BASE EXCESS BLDV CALC-SCNC: 6.4 MMOL/L (ref -3–3)
BASOPHILS # BLD AUTO: 0 10E3/UL (ref 0–0.2)
BASOPHILS NFR BLD AUTO: 0 %
BILIRUB DIRECT SERPL-MCNC: <0.2 MG/DL (ref 0–0.3)
BILIRUB SERPL-MCNC: 0.7 MG/DL
BUN SERPL-MCNC: 12 MG/DL (ref 8–23)
CALCIUM SERPL-MCNC: 9.4 MG/DL (ref 8.8–10.4)
CHLORIDE SERPL-SCNC: 105 MMOL/L (ref 98–107)
CREAT SERPL-MCNC: 0.75 MG/DL (ref 0.51–0.95)
CRP SERPL-MCNC: 16.1 MG/L
DIASTOLIC BLOOD PRESSURE - MUSE: 111 MMHG
EGFRCR SERPLBLD CKD-EPI 2021: 82 ML/MIN/1.73M2
EOSINOPHIL # BLD AUTO: 0.2 10E3/UL (ref 0–0.7)
EOSINOPHIL NFR BLD AUTO: 2 %
ERYTHROCYTE [DISTWIDTH] IN BLOOD BY AUTOMATED COUNT: 12.6 % (ref 10–15)
GLUCOSE SERPL-MCNC: 95 MG/DL (ref 70–99)
HCO3 BLDV-SCNC: 33 MMOL/L (ref 21–28)
HCO3 SERPL-SCNC: 28 MMOL/L (ref 22–29)
HCT VFR BLD AUTO: 44.3 % (ref 35–47)
HGB BLD-MCNC: 14.5 G/DL (ref 11.7–15.7)
IMM GRANULOCYTES # BLD: 0 10E3/UL
IMM GRANULOCYTES NFR BLD: 0 %
INR PPP: 1.08 (ref 0.85–1.15)
INTERPRETATION ECG - MUSE: NORMAL
LACTATE SERPL-SCNC: 1 MMOL/L (ref 0.7–2)
LYMPHOCYTES # BLD AUTO: 1.7 10E3/UL (ref 0.8–5.3)
LYMPHOCYTES NFR BLD AUTO: 23 %
MAGNESIUM SERPL-MCNC: 1.9 MG/DL (ref 1.7–2.3)
MCH RBC QN AUTO: 32.5 PG (ref 26.5–33)
MCHC RBC AUTO-ENTMCNC: 32.7 G/DL (ref 31.5–36.5)
MCV RBC AUTO: 99 FL (ref 78–100)
MONOCYTES # BLD AUTO: 0.7 10E3/UL (ref 0–1.3)
MONOCYTES NFR BLD AUTO: 9 %
NEUTROPHILS # BLD AUTO: 4.9 10E3/UL (ref 1.6–8.3)
NEUTROPHILS NFR BLD AUTO: 66 %
NRBC # BLD AUTO: 0 10E3/UL
NRBC BLD AUTO-RTO: 0 /100
NT-PROBNP SERPL-MCNC: 418 PG/ML (ref 0–1800)
O2/TOTAL GAS SETTING VFR VENT: 29 %
OXYHGB MFR BLDV: 59 % (ref 70–75)
P AXIS - MUSE: 75 DEGREES
PCO2 BLDV: 53 MM HG (ref 40–50)
PH BLDV: 7.4 [PH] (ref 7.32–7.43)
PLATELET # BLD AUTO: 190 10E3/UL (ref 150–450)
PO2 BLDV: 32 MM HG (ref 25–47)
POTASSIUM SERPL-SCNC: 4.1 MMOL/L (ref 3.4–5.3)
PR INTERVAL - MUSE: 162 MS
PROCALCITONIN SERPL IA-MCNC: 0.04 NG/ML
PROT SERPL-MCNC: 6.2 G/DL (ref 6.4–8.3)
QRS DURATION - MUSE: 86 MS
QT - MUSE: 398 MS
QTC - MUSE: 429 MS
R AXIS - MUSE: -72 DEGREES
RBC # BLD AUTO: 4.46 10E6/UL (ref 3.8–5.2)
SAO2 % BLDV: 60.3 % (ref 70–75)
SODIUM SERPL-SCNC: 143 MMOL/L (ref 135–145)
SYSTOLIC BLOOD PRESSURE - MUSE: 188 MMHG
T AXIS - MUSE: 68 DEGREES
TROPONIN T SERPL HS-MCNC: 10 NG/L
TROPONIN T SERPL HS-MCNC: 12 NG/L
TSH SERPL DL<=0.005 MIU/L-ACNC: 0.69 UIU/ML (ref 0.3–4.2)
VENTRICULAR RATE- MUSE: 70 BPM
WBC # BLD AUTO: 7.5 10E3/UL (ref 4–11)

## 2024-12-18 PROCEDURE — 71275 CT ANGIOGRAPHY CHEST: CPT

## 2024-12-18 PROCEDURE — 96375 TX/PRO/DX INJ NEW DRUG ADDON: CPT

## 2024-12-18 PROCEDURE — 83735 ASSAY OF MAGNESIUM: CPT | Performed by: EMERGENCY MEDICINE

## 2024-12-18 PROCEDURE — 36415 COLL VENOUS BLD VENIPUNCTURE: CPT | Performed by: EMERGENCY MEDICINE

## 2024-12-18 PROCEDURE — 82248 BILIRUBIN DIRECT: CPT | Performed by: EMERGENCY MEDICINE

## 2024-12-18 PROCEDURE — 93005 ELECTROCARDIOGRAM TRACING: CPT | Performed by: EMERGENCY MEDICINE

## 2024-12-18 PROCEDURE — 82565 ASSAY OF CREATININE: CPT | Performed by: EMERGENCY MEDICINE

## 2024-12-18 PROCEDURE — 99418 PROLNG IP/OBS E/M EA 15 MIN: CPT | Mod: FS

## 2024-12-18 PROCEDURE — 99292 CRITICAL CARE ADDL 30 MIN: CPT

## 2024-12-18 PROCEDURE — 84145 PROCALCITONIN (PCT): CPT | Performed by: EMERGENCY MEDICINE

## 2024-12-18 PROCEDURE — 83880 ASSAY OF NATRIURETIC PEPTIDE: CPT | Performed by: EMERGENCY MEDICINE

## 2024-12-18 PROCEDURE — 999N000157 HC STATISTIC RCP TIME EA 10 MIN

## 2024-12-18 PROCEDURE — 99223 1ST HOSP IP/OBS HIGH 75: CPT | Mod: FS

## 2024-12-18 PROCEDURE — 80048 BASIC METABOLIC PNL TOTAL CA: CPT | Performed by: EMERGENCY MEDICINE

## 2024-12-18 PROCEDURE — 86140 C-REACTIVE PROTEIN: CPT | Performed by: EMERGENCY MEDICINE

## 2024-12-18 PROCEDURE — 94640 AIRWAY INHALATION TREATMENT: CPT

## 2024-12-18 PROCEDURE — 85004 AUTOMATED DIFF WBC COUNT: CPT | Performed by: EMERGENCY MEDICINE

## 2024-12-18 PROCEDURE — 250N000011 HC RX IP 250 OP 636: Performed by: EMERGENCY MEDICINE

## 2024-12-18 PROCEDURE — 96365 THER/PROPH/DIAG IV INF INIT: CPT

## 2024-12-18 PROCEDURE — 250N000013 HC RX MED GY IP 250 OP 250 PS 637: Performed by: EMERGENCY MEDICINE

## 2024-12-18 PROCEDURE — 250N000013 HC RX MED GY IP 250 OP 250 PS 637

## 2024-12-18 PROCEDURE — 120N000001 HC R&B MED SURG/OB

## 2024-12-18 PROCEDURE — 250N000009 HC RX 250: Performed by: EMERGENCY MEDICINE

## 2024-12-18 PROCEDURE — 84484 ASSAY OF TROPONIN QUANT: CPT | Performed by: EMERGENCY MEDICINE

## 2024-12-18 PROCEDURE — 93971 EXTREMITY STUDY: CPT | Mod: RT

## 2024-12-18 PROCEDURE — 99207 PR APP CREDIT; MD BILLING SHARED VISIT: CPT | Mod: FS | Performed by: INTERNAL MEDICINE

## 2024-12-18 PROCEDURE — 84443 ASSAY THYROID STIM HORMONE: CPT | Performed by: EMERGENCY MEDICINE

## 2024-12-18 PROCEDURE — 85610 PROTHROMBIN TIME: CPT | Performed by: EMERGENCY MEDICINE

## 2024-12-18 PROCEDURE — 82805 BLOOD GASES W/O2 SATURATION: CPT | Performed by: EMERGENCY MEDICINE

## 2024-12-18 PROCEDURE — 83605 ASSAY OF LACTIC ACID: CPT | Performed by: EMERGENCY MEDICINE

## 2024-12-18 PROCEDURE — 85041 AUTOMATED RBC COUNT: CPT | Performed by: EMERGENCY MEDICINE

## 2024-12-18 PROCEDURE — 99291 CRITICAL CARE FIRST HOUR: CPT | Mod: 25

## 2024-12-18 RX ORDER — ACETAMINOPHEN 650 MG/1
650 SUPPOSITORY RECTAL EVERY 4 HOURS PRN
Status: DISCONTINUED | OUTPATIENT
Start: 2024-12-18 | End: 2024-12-20 | Stop reason: HOSPADM

## 2024-12-18 RX ORDER — ESCITALOPRAM OXALATE 10 MG/1
10 TABLET ORAL EVERY MORNING
Status: DISCONTINUED | OUTPATIENT
Start: 2024-12-19 | End: 2024-12-20 | Stop reason: HOSPADM

## 2024-12-18 RX ORDER — LIDOCAINE 40 MG/G
CREAM TOPICAL
Status: DISCONTINUED | OUTPATIENT
Start: 2024-12-18 | End: 2024-12-20 | Stop reason: HOSPADM

## 2024-12-18 RX ORDER — AMOXICILLIN 250 MG
1 CAPSULE ORAL 2 TIMES DAILY PRN
Status: DISCONTINUED | OUTPATIENT
Start: 2024-12-18 | End: 2024-12-20 | Stop reason: HOSPADM

## 2024-12-18 RX ORDER — ONDANSETRON 2 MG/ML
4 INJECTION INTRAMUSCULAR; INTRAVENOUS EVERY 6 HOURS PRN
Status: DISCONTINUED | OUTPATIENT
Start: 2024-12-18 | End: 2024-12-20 | Stop reason: HOSPADM

## 2024-12-18 RX ORDER — IOPAMIDOL 755 MG/ML
75 INJECTION, SOLUTION INTRAVASCULAR ONCE
Status: COMPLETED | OUTPATIENT
Start: 2024-12-18 | End: 2024-12-18

## 2024-12-18 RX ORDER — CYCLOSPORINE 0.5 MG/ML
1 EMULSION OPHTHALMIC EVERY 12 HOURS
Status: DISCONTINUED | OUTPATIENT
Start: 2024-12-18 | End: 2024-12-20 | Stop reason: HOSPADM

## 2024-12-18 RX ORDER — PROPRANOLOL HCL 20 MG
20 TABLET ORAL
Status: DISCONTINUED | OUTPATIENT
Start: 2024-12-18 | End: 2024-12-20 | Stop reason: HOSPADM

## 2024-12-18 RX ORDER — IPRATROPIUM BROMIDE AND ALBUTEROL SULFATE 2.5; .5 MG/3ML; MG/3ML
3 SOLUTION RESPIRATORY (INHALATION) ONCE
Status: COMPLETED | OUTPATIENT
Start: 2024-12-18 | End: 2024-12-18

## 2024-12-18 RX ORDER — ONDANSETRON 4 MG/1
4 TABLET, ORALLY DISINTEGRATING ORAL EVERY 6 HOURS PRN
Status: DISCONTINUED | OUTPATIENT
Start: 2024-12-18 | End: 2024-12-20 | Stop reason: HOSPADM

## 2024-12-18 RX ORDER — PROPRANOLOL HYDROCHLORIDE 10 MG/1
10 TABLET ORAL EVERY MORNING
COMMUNITY
Start: 2024-10-15

## 2024-12-18 RX ORDER — ALBUTEROL SULFATE 90 UG/1
1-2 INHALANT RESPIRATORY (INHALATION) EVERY 4 HOURS PRN
Status: DISCONTINUED | OUTPATIENT
Start: 2024-12-18 | End: 2024-12-20 | Stop reason: HOSPADM

## 2024-12-18 RX ORDER — MAGNESIUM SULFATE HEPTAHYDRATE 40 MG/ML
2 INJECTION, SOLUTION INTRAVENOUS ONCE
Status: COMPLETED | OUTPATIENT
Start: 2024-12-18 | End: 2024-12-18

## 2024-12-18 RX ORDER — PROPRANOLOL HYDROCHLORIDE 10 MG/1
10 TABLET ORAL EVERY MORNING
Status: DISCONTINUED | OUTPATIENT
Start: 2024-12-19 | End: 2024-12-20 | Stop reason: HOSPADM

## 2024-12-18 RX ORDER — AMOXICILLIN 250 MG
2 CAPSULE ORAL 2 TIMES DAILY PRN
Status: DISCONTINUED | OUTPATIENT
Start: 2024-12-18 | End: 2024-12-20 | Stop reason: HOSPADM

## 2024-12-18 RX ORDER — METHYLPREDNISOLONE SODIUM SUCCINATE 125 MG/2ML
125 INJECTION INTRAMUSCULAR; INTRAVENOUS ONCE
Status: COMPLETED | OUTPATIENT
Start: 2024-12-18 | End: 2024-12-18

## 2024-12-18 RX ORDER — CALCIUM CARBONATE 500 MG/1
1000 TABLET, CHEWABLE ORAL 4 TIMES DAILY PRN
Status: DISCONTINUED | OUTPATIENT
Start: 2024-12-18 | End: 2024-12-20 | Stop reason: HOSPADM

## 2024-12-18 RX ORDER — DONEPEZIL HYDROCHLORIDE 5 MG/1
5 TABLET, FILM COATED ORAL AT BEDTIME
Status: DISCONTINUED | OUTPATIENT
Start: 2024-12-18 | End: 2024-12-20 | Stop reason: HOSPADM

## 2024-12-18 RX ORDER — DONEPEZIL HYDROCHLORIDE 5 MG/1
5 TABLET, FILM COATED ORAL AT BEDTIME
COMMUNITY
Start: 2024-03-01

## 2024-12-18 RX ORDER — ACETAMINOPHEN 325 MG/1
650 TABLET ORAL EVERY 4 HOURS PRN
Status: DISCONTINUED | OUTPATIENT
Start: 2024-12-18 | End: 2024-12-20 | Stop reason: HOSPADM

## 2024-12-18 RX ORDER — CARBOXYMETHYLCELLULOSE SODIUM 5 MG/ML
1 SOLUTION/ DROPS OPHTHALMIC 3 TIMES DAILY
Status: DISCONTINUED | OUTPATIENT
Start: 2024-12-19 | End: 2024-12-20 | Stop reason: HOSPADM

## 2024-12-18 RX ORDER — LORAZEPAM 0.5 MG/1
0.5 TABLET ORAL AT BEDTIME
Status: DISCONTINUED | OUTPATIENT
Start: 2024-12-18 | End: 2024-12-20 | Stop reason: HOSPADM

## 2024-12-18 RX ORDER — PRAMIPEXOLE DIHYDROCHLORIDE 0.25 MG/1
0.25 TABLET ORAL AT BEDTIME
Status: DISCONTINUED | OUTPATIENT
Start: 2024-12-18 | End: 2024-12-20 | Stop reason: HOSPADM

## 2024-12-18 RX ORDER — MIRTAZAPINE 30 MG/1
30 TABLET, FILM COATED ORAL AT BEDTIME
Status: DISCONTINUED | OUTPATIENT
Start: 2024-12-18 | End: 2024-12-20 | Stop reason: HOSPADM

## 2024-12-18 RX ORDER — PROPRANOLOL HYDROCHLORIDE 10 MG/1
20 TABLET ORAL
COMMUNITY

## 2024-12-18 RX ORDER — PRAMIPEXOLE DIHYDROCHLORIDE 0.25 MG/1
0.25 TABLET ORAL AT BEDTIME
COMMUNITY
Start: 2024-12-16

## 2024-12-18 RX ADMIN — PROPRANOLOL HYDROCHLORIDE 20 MG: 20 TABLET ORAL at 23:16

## 2024-12-18 RX ADMIN — APIXABAN 10 MG: 5 TABLET, FILM COATED ORAL at 22:17

## 2024-12-18 RX ADMIN — IPRATROPIUM BROMIDE AND ALBUTEROL SULFATE 3 ML: .5; 3 SOLUTION RESPIRATORY (INHALATION) at 17:07

## 2024-12-18 RX ADMIN — CYCLOSPORINE 1 DROP: 0.5 EMULSION OPHTHALMIC at 23:21

## 2024-12-18 RX ADMIN — METHYLPREDNISOLONE SODIUM SUCCINATE 125 MG: 125 INJECTION, POWDER, FOR SOLUTION INTRAMUSCULAR; INTRAVENOUS at 17:45

## 2024-12-18 RX ADMIN — PRAMIPEXOLE DIHYDROCHLORIDE 0.25 MG: 0.25 TABLET ORAL at 23:18

## 2024-12-18 RX ADMIN — MAGNESIUM SULFATE HEPTAHYDRATE 2 G: 40 INJECTION, SOLUTION INTRAVENOUS at 17:44

## 2024-12-18 RX ADMIN — MIRTAZAPINE 30 MG: 30 TABLET, FILM COATED ORAL at 23:18

## 2024-12-18 RX ADMIN — IOPAMIDOL 75 ML: 755 INJECTION, SOLUTION INTRAVENOUS at 20:32

## 2024-12-18 RX ADMIN — LORAZEPAM 0.5 MG: 0.5 TABLET ORAL at 23:19

## 2024-12-18 RX ADMIN — DONEPEZIL HYDROCHLORIDE 5 MG: 5 TABLET, FILM COATED ORAL at 23:17

## 2024-12-18 ASSESSMENT — ACTIVITIES OF DAILY LIVING (ADL)
ADLS_ACUITY_SCORE: 41
ADLS_ACUITY_SCORE: 41
ADLS_ACUITY_SCORE: 45
ADLS_ACUITY_SCORE: 45
ADLS_ACUITY_SCORE: 41

## 2024-12-18 ASSESSMENT — COLUMBIA-SUICIDE SEVERITY RATING SCALE - C-SSRS
6. HAVE YOU EVER DONE ANYTHING, STARTED TO DO ANYTHING, OR PREPARED TO DO ANYTHING TO END YOUR LIFE?: NO
1. IN THE PAST MONTH, HAVE YOU WISHED YOU WERE DEAD OR WISHED YOU COULD GO TO SLEEP AND NOT WAKE UP?: NO
2. HAVE YOU ACTUALLY HAD ANY THOUGHTS OF KILLING YOURSELF IN THE PAST MONTH?: NO

## 2024-12-18 NOTE — ED PROVIDER NOTES
EMERGENCY DEPARTMENT ENCOUNTER      NAME: Sharon Weinberg  AGE: 79 year old female  YOB: 1945  MRN: [id number]  EVALUATION DATE & TIME: 12/18/2024  4:44 PM    PCP: Greer Lay    ED PROVIDER: Casper Pabon M.D.      Chief Complaint   Patient presents with    Shortness of Breath    Leg Swelling         IMPRESSION  1. Other acute pulmonary embolism without acute cor pulmonale (H)    2. COPD exacerbation (H)    3. Acute respiratory failure with hypoxia (H)        PLAN  - admit to hospitalist for further care; cards tele admit    ED COURSE & MEDICAL DECISION MAKING    ED Course as of 12/18/24 2209   Wed Dec 18, 2024   1735 79yoF with history of COPD, HTN, HLD, prior DVT (states she stopped Eliquis a couple months ago) presenting per EMS from home for evaluation of 2 days of progressive shortness of breath; no change in her chronic cough. No pain. Also notes a couple weeks of increased painless swelling to RLE.    Satting 85% on room air on presentation (94% on 3L NC) with SBP 180s and otherwise normal vitals. Calm on exam with tight air entry bilaterally with no wheezing or crackles, benign abdomen, mild nontender pitting edema to RLE up to mid shin with no overlying skin changes, nonfocal neuro exam.    DVT/PE, COPD, CHF top the differential. Will obtain CT, EKG, blood while giving Duonebs, IV steroids, IV mag. Will require admission given new O2 need. Patient comfortable with this plan; no further questions at this time.   1933 US RLE independently reviewed & interpreted by me: no DVT.   2204 CT chest with segmental & subsegmental PE to right lung; no pneumonia or pulmonary edema. No pneumothorax.    Labs with high-sensitivity troponin within normal limits, BNP within normal limits (doubt cor pulmonale), no QIANA, no glaring electrolyte abnormality, normal LFTs, no QIANA, no glaring electrolyte abnormality, no anemia.    Patient stable on 3L NC on recheck; still new O2 need. Warrants  admission. Feeling much better after COPD meds; suspect mild COPD exacerbation in play as well. Notes she had been taking Eliquis until this was stopped a couple months ago; prefers to restart this here now for her PE.    Consulted hospitalist for admission; they agreed. Patient understood and agreed with the plan; no further questions at the time of admission.       --------------------------------------------------------------------------------   --------------------------------------------------------------------------------     4:56 PM I met with the patient for the initial interview and physical examination. Discussed plan for treatment and workup in the ED.  9:28 PM I spoke with Dr. Nash, the hospitalist. Made plans for admission.         This patient involved a high degree of complexity in medical decision making, as significant risks were present and assessed. Recent encounters & results in medical record reviewed by me.    All workup (i.e. any EKG/labs/imaging as per charting below) reviewed and independently interpreted by me. See respective sections for details.        See additional MDM below if interested.      MEDICATIONS GIVEN IN THE EMERGENCY DEPARTMENT  Medications   apixaban ANTICOAGULANT (ELIQUIS) tablet 10 mg (has no administration in time range)     Followed by   apixaban ANTICOAGULANT (ELIQUIS) tablet 5 mg (has no administration in time range)   ipratropium - albuterol 0.5 mg/2.5 mg/3 mL (DUONEB) neb solution 3 mL (3 mLs Nebulization $Given 12/18/24 1707)   ipratropium - albuterol 0.5 mg/2.5 mg/3 mL (DUONEB) neb solution 3 mL (3 mLs Nebulization $Given 12/18/24 1707)   methylPREDNISolone Na Suc (solu-MEDROL) injection 125 mg (125 mg Intravenous $Given 12/18/24 1745)   magnesium sulfate 2 g in 50 mL sterile water intermittent infusion (0 g Intravenous Stopped 12/18/24 1810)   iopamidol (ISOVUE-370) solution 75 mL (75 mLs Intravenous $Given 12/18/24 2032)                =================================================================      HPI  Use of : N/A       Sharon Weinberg is a 79 year old female with a pertinent history of hypertension, cerebrovascular disease, DVT, mild cognitive impairment,  COPD, who presents to this ED by EMS for evaluation of shortness of breath.     The patient reports that she has felt more short of breath over the past 2-3 days. She does not normally use oxygen, but is currently, as her oxygen dropped with movement. She reports having copd and using an inhaler. She reports having to stay at the hospital for copd flare up in the past. Of note, she reported some right lower leg swelling. No changes in cough and denies pain.     10/15/2024- Urgency room visit for leg swelling and pain. Imaging US venous right lower extremity with no deep venous thrombosis.       --------------- MEDICAL HISTORY ---------------  PAST MEDICAL HISTORY:  Reviewed independently by me.  History reviewed. No pertinent past medical history.  Patient Active Problem List   Diagnosis    Benign neoplasm of ascending colon    History of colonic polyps    Cystocele, midline    Polyp of colon    Rectocele    Uterovaginal prolapse, unspecified    Acute blood loss anemia    Anxiety    Closed left hip fracture (H)    Depression    HTN (hypertension)    Hypotension    RLS (restless legs syndrome)    Balance problem    Cognitive changes    Mild cognitive impairment    Hyperlipidemia    Memory loss    COPD exacerbation (H)    Acute respiratory failure with hypoxia (H)    Other acute pulmonary embolism without acute cor pulmonale (H)       PAST SURGICAL HISTORY:  Reviewed independently by me.  History reviewed. No pertinent surgical history.    CURRENT MEDICATIONS:    Reviewed independently by me.    Current Facility-Administered Medications:     apixaban ANTICOAGULANT (ELIQUIS) tablet 10 mg, 10 mg, Oral, BID **FOLLOWED BY** [START ON 12/25/2024] apixaban  ANTICOAGULANT (ELIQUIS) tablet 5 mg, 5 mg, Oral, BID, Casper Pabon MD    Current Outpatient Medications:     acetaminophen (TYLENOL) 325 MG tablet, Take 650 mg by mouth every 6 hours as needed for mild pain, Disp: , Rfl:     albuterol (PROAIR HFA/PROVENTIL HFA/VENTOLIN HFA) 108 (90 Base) MCG/ACT inhaler, Inhale 1-2 puffs into the lungs every 4 hours as needed, Disp: , Rfl:     alendronate (FOSAMAX) 70 MG tablet, Take 70 mg by mouth every 7 days, Disp: , Rfl:     calcium carbonate 500 mg, elemental, (OSCAL) 500 MG tablet, Take 1 tablet by mouth 2 times daily, Disp: , Rfl:     carboxymethylcellulose PF (REFRESH PLUS) 0.5 % ophthalmic solution, Place 1 drop into both eyes 3 times daily., Disp: , Rfl:     cycloSPORINE (RESTASIS) 0.05 % ophthalmic emulsion, Place 1 drop into both eyes every 12 hours, Disp: , Rfl:     donepezil (ARICEPT) 5 MG tablet, Take 5 mg by mouth at bedtime., Disp: , Rfl:     escitalopram (LEXAPRO) 10 MG tablet, Take 10 mg by mouth every morning., Disp: , Rfl:     LORazepam (ATIVAN) 0.5 MG tablet, Take 0.5 mg by mouth at bedtime., Disp: , Rfl:     mirtazapine (REMERON) 30 MG tablet, Take 30 mg by mouth At Bedtime, Disp: , Rfl:     pramipexole (MIRAPEX) 0.25 MG tablet, Take 0.25 mg by mouth at bedtime., Disp: , Rfl:     propranolol (INDERAL) 10 MG tablet, Take 10 mg by mouth every morning. (Also taking 20 mg QPM with dinner), Disp: , Rfl:     propranolol (INDERAL) 10 MG tablet, Take 20 mg by mouth daily (with dinner). (Also taking 10 mg QAM), Disp: , Rfl:     Vitamin D3 (VITAMIN D, CHOLECALCIFEROL,) 25 mcg (1000 units) tablet, Take 1 tablet by mouth every morning., Disp: , Rfl:     ALLERGIES:  Reviewed independently by me.  Allergies   Allergen Reactions    Penicillins Palpitations, Anaphylaxis, Hives and Difficulty breathing    Cephalexin Hives    Doxycycline Itching and Rash    Sulfa Antibiotics Hives    Sulfatolamide Hives       FAMILY HISTORY:  Reviewed independently by me.  Family History    Problem Relation Age of Onset    Coronary Artery Disease Father 90    Heart Disease Sister     Ovarian Cancer Daughter     Hereditary Breast and Ovarian Cancer Syndrome Daughter          SOCIAL HISTORY:   Reviewed independently by me.  Social History     Socioeconomic History    Marital status:    Tobacco Use    Smoking status: Former     Types: Cigarettes    Smokeless tobacco: Never     Social Drivers of Health      Received from [facility]    Financial Resource Strain    [facility]    Social Connections       --------------- PHYSICAL EXAM ---------------  Nursing notes and vitals independently reviewed by me.  VITALS:  Vitals:    12/18/24 1709 12/18/24 1751 12/18/24 1802 12/18/24 1836   BP:  (!) 170/74 (!) 162/72 (!) 156/67   Pulse: 75 74 79 71   Resp: 27 21 28 26   Temp:       TempSrc:       SpO2: 94% 95%     Weight:       Height:           PHYSICAL EXAM:    General:  alert, interactive, no distress  Eyes:  conjunctivae clear, conjugate gaze  HENT:  atraumatic, nose with no rhinorrhea, oropharynx clear  Neck:  no meningismus  Cardiovascular:  HR 80s during exam, regular rhythm, no murmurs, brisk cap refill  Chest:  no chest wall tenderness  Pulmonary:  no stridor, normal phonation, normal work of breathing, clear lungs bilaterally, tight air entry bilaterally, no wheezing or crackles.   Abdomen:  soft, nondistended, nontender  :  no CVA tenderness  Back:  no midline spinal tenderness  Musculoskeletal:  1+ nontender pitting edema to right mid shin with mild calf tenderness, LLE no edema. Gross ROM intact to joints of extremities with no obvious deformities.  Skin:  warm, dry, no rash  Neuro:  awake, alert, answers questions appropriately, follows commands, moves all limbs  Psych:  calm, normal affect      --------------- RESULTS  ---------------  EKG:    Reviewed and independently interpreted by me.  - NST at 70bpm, no ST or T wave changes, normal intervals  - unchanged from prior on 8/8/24  My read.    LAB:  Reviewed and independently interpreted by me.  Results for orders placed or performed during the hospital encounter of 12/18/24   CT Chest Pulmonary Embolism w Contrast    Impression    IMPRESSION:  1.  There are a few small segmental and subsegmental pulmonary emboli in the right middle and lower lobes.    2.  Mild prominence of the central pulmonary arteries is stable compatible with an element of pulmonary arterial hypertension.    3.  Emphysema.    4.  Small pleural effusions. Mild bibasilar infiltrates.    5.  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    6.  Results given to Dr. Pabon.    NOTE: ABNORMAL REPORT    THE DICTATION ABOVE DESCRIBES AN ABNORMALITY FOR WHICH FOLLOW-UP IS NEEDED.    US Lower Extremity Venous Duplex Right    Impression    IMPRESSION:  1.  No deep venous thrombosis in the right leg.   Result Value Ref Range    INR 1.08 0.85 - 1.15   Basic metabolic panel   Result Value Ref Range    Sodium 143 135 - 145 mmol/L    Potassium 4.1 3.4 - 5.3 mmol/L    Chloride 105 98 - 107 mmol/L    Carbon Dioxide (CO2) 28 22 - 29 mmol/L    Anion Gap 10 7 - 15 mmol/L    Urea Nitrogen 12.0 8.0 - 23.0 mg/dL    Creatinine 0.75 0.51 - 0.95 mg/dL    GFR Estimate 82 >60 mL/min/1.73m2    Calcium 9.4 8.8 - 10.4 mg/dL    Glucose 95 70 - 99 mg/dL   Hepatic function panel   Result Value Ref Range    Protein Total 6.2 (L) 6.4 - 8.3 g/dL    Albumin 3.8 3.5 - 5.2 g/dL    Bilirubin Total 0.7 <=1.2 mg/dL    Alkaline Phosphatase 86 40 - 150 U/L    AST 22 0 - 45 U/L    ALT 12 0 - 50 U/L    Bilirubin Direct <0.20 0.00 - 0.30 mg/dL   Lactic acid whole blood with 1x repeat in 2 hr when >2   Result Value Ref Range    Lactic Acid, Initial 1.0 0.7 - 2.0 mmol/L   Result Value Ref Range    Procalcitonin 0.04 <0.50 ng/mL   Result Value  Ref Range    Troponin T, High Sensitivity 12 <=14 ng/L   Result Value Ref Range    Magnesium 1.9 1.7 - 2.3 mg/dL   TSH with free T4 reflex   Result Value Ref Range    TSH 0.69 0.30 - 4.20 uIU/mL   Blood gas venous   Result Value Ref Range    pH Venous 7.40 7.32 - 7.43    pCO2 Venous 53 (H) 40 - 50 mm Hg    pO2 Venous 32 25 - 47 mm Hg    Bicarbonate Venous 33 (H) 21 - 28 mmol/L    Base Excess/Deficit Venous 6.4 (H) -3.0 - 3.0 mmol/L    FIO2 29     Oxyhemoglobin Venous 59 (L) 70 - 75 %    O2 Sat, Venous 60.3 (L) 70.0 - 75.0 %   Nt probnp inpatient (BNP)   Result Value Ref Range    N terminal Pro BNP Inpatient 418 0 - 1,800 pg/mL   Result Value Ref Range    CRP Inflammation 16.10 (H) <5.00 mg/L   CBC with platelets and differential   Result Value Ref Range    WBC Count 7.5 4.0 - 11.0 10e3/uL    RBC Count 4.46 3.80 - 5.20 10e6/uL    Hemoglobin 14.5 11.7 - 15.7 g/dL    Hematocrit 44.3 35.0 - 47.0 %    MCV 99 78 - 100 fL    MCH 32.5 26.5 - 33.0 pg    MCHC 32.7 31.5 - 36.5 g/dL    RDW 12.6 10.0 - 15.0 %    Platelet Count 190 150 - 450 10e3/uL    % Neutrophils 66 %    % Lymphocytes 23 %    % Monocytes 9 %    % Eosinophils 2 %    % Basophils 0 %    % Immature Granulocytes 0 %    NRBCs per 100 WBC 0 <1 /100    Absolute Neutrophils 4.9 1.6 - 8.3 10e3/uL    Absolute Lymphocytes 1.7 0.8 - 5.3 10e3/uL    Absolute Monocytes 0.7 0.0 - 1.3 10e3/uL    Absolute Eosinophils 0.2 0.0 - 0.7 10e3/uL    Absolute Basophils 0.0 0.0 - 0.2 10e3/uL    Absolute Immature Granulocytes 0.0 <=0.4 10e3/uL    Absolute NRBCs 0.0 10e3/uL   Result Value Ref Range    Troponin T, High Sensitivity 10 <=14 ng/L   ECG 12-LEAD WITH MUSE (LHE)   Result Value Ref Range    Systolic Blood Pressure 188 mmHg    Diastolic Blood Pressure 111 mmHg    Ventricular Rate 70 BPM    Atrial Rate 70 BPM    CA Interval 162 ms    QRS Duration 86 ms     ms    QTc 429 ms    P Axis 75 degrees    R AXIS -72 degrees    T Axis 68 degrees    Interpretation ECG       Sinus  rhythm  Left anterior fascicular block  Anterior infarct , age undetermined  Abnormal ECG  When compared with ECG of 08-Aug-2024 19:31,  No significant change was found  Confirmed by SEE ED PROVIDER NOTE FOR, ECG INTERPRETATION ([id number]),  Geraldine Quiros ([id number]) on 12/18/2024 6:09:19 PM           RADIOLOGY:  Reviewed and independently interpreted by me. Please see official radiology report.  Recent Results (from the past 24 hours)   US Lower Extremity Venous Duplex Right    Narrative    EXAM: US LOWER EXTREMITY VENOUS DUPLEX RIGHT  LOCATION: [facility]  DATE: 12/18/2024    INDICATION: right leg swelling  COMPARISON: 10/15/2024, 10/3/2024, 8/8/2024  TECHNIQUE: Venous Duplex ultrasound of the right lower extremity with and without compression, augmentation and duplex. Color flow and spectral Doppler with waveform analysis performed.    FINDINGS: Exam includes the common femoral, femoral, popliteal, and contralateral common femoral veins as well as segmentally visualized deep calf veins and greater saphenous vein.     RIGHT: No deep vein thrombosis. No superficial thrombophlebitis. No popliteal cyst.      Impression    IMPRESSION:  1.  No deep venous thrombosis in the right leg.   CT Chest Pulmonary Embolism w Contrast    Narrative    EXAM: CT CHEST PULMONARY EMBOLISM W CONTRAST  LOCATION: [facility]  DATE: 12/18/2024    INDICATION: SOB, hypoxia, history of DVT.  COMPARISON: 8/8/2024.  TECHNIQUE: CT chest pulmonary angiogram during arterial phase injection of IV contrast. Multiplanar reformats and MIP reconstructions were performed. Dose reduction techniques were used.   CONTRAST: Isovue 370 75 mL.    FINDINGS:  ANGIOGRAM CHEST: There are a few small segmental and subsegmental pulmonary emboli within the right middle and lower lobes. Mild prominence of the central pulmonary arteries compatible with an element of pulmonary arterial hypertension.     LUNGS AND PLEURA:  Emphysema. Small pleural effusions with mild bibasilar infiltrates.    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    CORONARY ARTERY CALCIFICATION: Severe.    UPPER ABDOMEN: Normal.    MUSCULOSKELETAL: Normal.      Impression    IMPRESSION:  1.  There are a few small segmental and subsegmental pulmonary emboli in the right middle and lower lobes.    2.  Mild prominence of the central pulmonary arteries is stable compatible with an element of pulmonary arterial hypertension.    3.  Emphysema.    4.  Small pleural effusions. Mild bibasilar infiltrates.    5.  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    6.  Results given to Dr. Pabon.    NOTE: ABNORMAL REPORT    THE DICTATION ABOVE DESCRIBES AN ABNORMALITY FOR WHICH FOLLOW-UP IS NEEDED.          PROCEDURES:   Procedures   --------------------------------------------------------------------------------   Cardiac telemetry monitoring ordered by me secondary to the patient's history of shortness of breath and to monitor the patient for dysrhythmia. Reviewed & independently interpreted by me. Revealed normal sinus rhythm.  --------------------------------------------------------------------------------       Critical Care     Performed by:   Casper Pabon MD   Authorized by:   Casper Pabon MD  Total critical care time: 105 minutes (Critical care time was exclusive of separately billable procedures and treating other patients.)    Critical care was necessary to treat or prevent imminent or life-threatening deterioration of the following conditions: Hypoxia with COPD exacerbation & pulmonary embolism requiring multiple nebulizers, IV steroids, IV magnesium, anticoagulation, telemetry admission    Critical care was time spent personally by me on the following activities:  - obtaining history from patient or surrogate  - examination of patient  - development of treatment plan with patient or  surrogate  - ordering and performing treatments and interventions  - ordering and review of laboratory studies  - ordering and review of radiographic studies  - re-evaluation of patient's condition  - monitoring for potential decompensation  - discussion with consultants    ---------------------------------------------------------------------------------------------------------------------  ---------------------------------------------------------------------------------------------------------------------                --------------- ADDITIONAL MDM ---------------  MIPS:  CT Pulmonary Angiogram:Patient is moderate to high risk for PE.    History:  - I considered systemic symptoms of the presenting illness.  - Supplemental history from:       -- patient, son  - External Record(s) reviewed:       -- Inpatient/outpatient record, prior labs, prior imaging       -- see above ED course & MDM for further details    Workup:  - Chart documentation above includes differential considered and any EKGs or imaging independently interpreted by provider.  - In additional to work up documented, I considered the following work up:       -- see above ED course & MDM for further details    External Consultation:  - Discussion of management with another provider:       -- see above charting for additional    Complicating Factors:  - Care impacted by chronic illness:       -- see above MDM, past medical history, & problem list    Disposition Considerations:  - Admit           I, Rosemarie Swift, am serving as a scribe to document services personally performed by Dr. Casper Pabon based on my observation and the provider's statements to me. I, Casper Pabon MD attest that Rosemarie Swift is acting in a scribe capacity, has observed my performance of the services and has documented them in accordance with my direction.      Casper Pabon MD  12/18/24  Emergency Medicine  [facility] EMERGENCY  ROOM  [address] [place] [id number]  [phone]  Dept: [phone]     Casper Pabon MD  12/18/24 [id number]

## 2024-12-18 NOTE — ED NOTES
Bed: [facility]-13  Expected date:   Expected time:   Means of arrival:   Comments:  [facility]

## 2024-12-18 NOTE — ED TRIAGE NOTES
Patient presents to the ED via EMS from [facility] with complains of shortness of breath and right lower lower leg swelling. Pt reports that she is having SOB for two and three days ago. Up arrival oxygen saturation 85 to 86 RA and placed on 3L, NC.  Hx COPD and DVT

## 2024-12-19 LAB
ANION GAP SERPL CALCULATED.3IONS-SCNC: 10 MMOL/L (ref 7–15)
BUN SERPL-MCNC: 12.5 MG/DL (ref 8–23)
CALCIUM SERPL-MCNC: 9.3 MG/DL (ref 8.8–10.4)
CHLORIDE SERPL-SCNC: 105 MMOL/L (ref 98–107)
CREAT SERPL-MCNC: 0.65 MG/DL (ref 0.51–0.95)
EGFRCR SERPLBLD CKD-EPI 2021: 90 ML/MIN/1.73M2
ERYTHROCYTE [DISTWIDTH] IN BLOOD BY AUTOMATED COUNT: 12.6 % (ref 10–15)
GLUCOSE SERPL-MCNC: 126 MG/DL (ref 70–99)
HCO3 SERPL-SCNC: 27 MMOL/L (ref 22–29)
HCT VFR BLD AUTO: 46.1 % (ref 35–47)
HGB BLD-MCNC: 14.9 G/DL (ref 11.7–15.7)
MCH RBC QN AUTO: 31.7 PG (ref 26.5–33)
MCHC RBC AUTO-ENTMCNC: 32.3 G/DL (ref 31.5–36.5)
MCV RBC AUTO: 98 FL (ref 78–100)
PLATELET # BLD AUTO: 188 10E3/UL (ref 150–450)
POTASSIUM SERPL-SCNC: 4.5 MMOL/L (ref 3.4–5.3)
RBC # BLD AUTO: 4.7 10E6/UL (ref 3.8–5.2)
SODIUM SERPL-SCNC: 142 MMOL/L (ref 135–145)
WBC # BLD AUTO: 5.3 10E3/UL (ref 4–11)

## 2024-12-19 PROCEDURE — 85041 AUTOMATED RBC COUNT: CPT

## 2024-12-19 PROCEDURE — 250N000013 HC RX MED GY IP 250 OP 250 PS 637: Performed by: EMERGENCY MEDICINE

## 2024-12-19 PROCEDURE — 99233 SBSQ HOSP IP/OBS HIGH 50: CPT | Performed by: HOSPITALIST

## 2024-12-19 PROCEDURE — 250N000013 HC RX MED GY IP 250 OP 250 PS 637

## 2024-12-19 PROCEDURE — 80048 BASIC METABOLIC PNL TOTAL CA: CPT

## 2024-12-19 PROCEDURE — 120N000001 HC R&B MED SURG/OB

## 2024-12-19 PROCEDURE — 85018 HEMOGLOBIN: CPT

## 2024-12-19 PROCEDURE — 36415 COLL VENOUS BLD VENIPUNCTURE: CPT

## 2024-12-19 RX ADMIN — APIXABAN 10 MG: 5 TABLET, FILM COATED ORAL at 08:00

## 2024-12-19 RX ADMIN — APIXABAN 10 MG: 5 TABLET, FILM COATED ORAL at 20:02

## 2024-12-19 RX ADMIN — PROPRANOLOL HYDROCHLORIDE 10 MG: 10 TABLET ORAL at 08:00

## 2024-12-19 RX ADMIN — PROPRANOLOL HYDROCHLORIDE 20 MG: 20 TABLET ORAL at 21:19

## 2024-12-19 RX ADMIN — PRAMIPEXOLE DIHYDROCHLORIDE 0.25 MG: 0.25 TABLET ORAL at 21:13

## 2024-12-19 RX ADMIN — DONEPEZIL HYDROCHLORIDE 5 MG: 5 TABLET, FILM COATED ORAL at 21:13

## 2024-12-19 RX ADMIN — ESCITALOPRAM OXALATE 10 MG: 10 TABLET ORAL at 08:00

## 2024-12-19 RX ADMIN — Medication 1 DROP: at 08:00

## 2024-12-19 RX ADMIN — Medication 1 DROP: at 13:35

## 2024-12-19 RX ADMIN — CYCLOSPORINE 1 DROP: 0.5 EMULSION OPHTHALMIC at 21:14

## 2024-12-19 RX ADMIN — MIRTAZAPINE 30 MG: 30 TABLET, FILM COATED ORAL at 21:12

## 2024-12-19 RX ADMIN — LORAZEPAM 0.5 MG: 0.5 TABLET ORAL at 21:12

## 2024-12-19 RX ADMIN — Medication 1 DROP: at 20:03

## 2024-12-19 RX ADMIN — CYCLOSPORINE 1 DROP: 0.5 EMULSION OPHTHALMIC at 13:35

## 2024-12-19 ASSESSMENT — ACTIVITIES OF DAILY LIVING (ADL)
ADLS_ACUITY_SCORE: 51
ADLS_ACUITY_SCORE: 49
ADLS_ACUITY_SCORE: 51
ADLS_ACUITY_SCORE: 49
ADLS_ACUITY_SCORE: 49
ADLS_ACUITY_SCORE: 51
ADLS_ACUITY_SCORE: 49

## 2024-12-19 NOTE — PROGRESS NOTES
"[facility]    Medicine Progress Note - Hospitalist Service    Date of Admission:  12/18/2024    Assessment & Plan      Sharon Weinberg is a 79 year old female with PMHx significant for previous DVT of RLE (8/2024), emphysema, lower extremity edema, hypertension, history of sinus tachycardia, depression, anxiety, insomnia, restless leg syndrome, osteoporosis who was admitted on 12/18/2024 for initiation of anticoagulation and oxygen support for acute pulmonary emboli.     ED Course: Hypertensive on arrival to /11, now improved somewhat to systolic 150s, hypoxic on RA with improvement in sats to mid 90s on 3L/NC (no baseline oxygen requirement), afebrile. Lab workup including BMP, LFT, CBC, and Mg grossly unremarkable. CRP elevated at 16.10. INR 1.08. Lactic acid 1.0. Procalcitonin 0.04. Initial troponin T normal at 12, delta troponin 10. proBNP normal at 418. TSH 0.69. VBG with pH 7.4, pCO2 mildly elevated at 53, bicarb elevated at 33. US of RLE negative for DVT. CT Chest PE Study demonstrates \"There are a few small segmental and subsegmental pulmonary emboli in the right middle and lower lobes. Small pleural effusions. Mild bibasilar infiltrates.\" ECG shows sinus rhythm, left anterior fascicular block, no obvious ischemic changes and no significant change compared to previous study 8/2024. Given DuoNeb x 2, 125 mg IV Solu-Medrol, 2 g IV magnesium.    Patient new to me 12/19; still hypoxic with 2-3 L of O2 via NC requirement; discussed POC with patient as well as with RN at bedside. Continue to attempt and wean O2 as tolerated; given her baseline COPD, her acute PE presents increased strain  and she may require some oxygen including possibly at eventual discharge, which was discussed with her.       Acute pulmonary emboli  Acute respiratory failure with hypoxia  History of DVT of RLE (8/2024)  Presents with two days of increasing dyspnea with minimal exertion. Requiring 3L/NC oxygen " supplement to maintain sats in mid 90s in ED, no prior oxygen requirement at baseline. Does have history of DVT of RLE in 8/2024 and was treated with course of Eliquis, which was discontinued in 10/2024 after repeat US showed resolution of DVT.   Chest CT in ED reveals a few small PE in RML and RLL. US of RLE negative for DVT. Per chart review, there do not seem to be any clear provoking factors for initial DVT in 8/2024 or PE now.  - Start anticoagulation with Eliquis  - Supplemental oxygen as needed to maintain sats >90%  - Continuous pulse oximetry  - Telemetry  - AM labs: CBC, BMP  - Outpatient follow-up with PCP for potential underlying hypercoagulable follow-up and work-up     Emphysema  Does report shortness of breath and hypoxia, but this is more likely secondary to acute PE as above. No increased cough, no fevers. Do not suspect acute exacerbation of COPD at this time. S/p 125 mg IV Solu-Medrol and DuoNeb x 2 in ED.  - Hold off on further steroids or antibiotics for now  - Resume home PRN albuterol inhaler    Bilateral lower extremity edema  1-2+ pitting edema to bilateral lower extremities on exam, R>L. US of RLE negative for DVT. proBNP is not elevated, so do not suspect acute HF at this time. Patient has been seen by [facility]iology in 5/2024 for this, who suggested consideration of low dose hydrochlorothiazide for edema and HTN (this was not started though).    Hypertension  History of sinus tachycardia  - Resume home propranolol with hold parameters    Depression  Anxiety  Insomnia   Follows with Psychiatry at [facility] for medication management.  - Resume home Lexapro, Ativan, Remeron, propranolol    Restless legs syndrome  - Resume home Mirapex    Mild cognitive impairment  - Resume home Aricept    Dry eyes  - Resume home eye drops    Osteoporosis - Prescribed once weekly alendronate several months ago, but has not yet started taking.          Diet: Combination Diet Regular Diet Adult    DVT Prophylaxis:  DOAC, Pneumatic Compression Devices, Anti-embolisim stockings (TEDs), and Ambulate every shift  Bowles Catheter: Not present  Lines: None     Cardiac Monitoring: ACTIVE order. Indication: acute pulmonary emboli, hypoxia  Code Status: Full Code      Clinically Significant Risk Factors Present on Admission                   # Hypertension: Noted on problem list     # Acute Hypoxic Respiratory Failure: Documented O2 saturation < 90%. Continue supplemental oxygen as needed                   Social Drivers of Health    Tobacco Use: Medium Risk (12/18/2024)    Patient History     Smoking Tobacco Use: Former     Smokeless Tobacco Use: Never    Received from [employer]    Financial Resource Strain    Received from [employer]    Social Connections          Disposition Plan     Medically Ready for Discharge: Anticipated Tomorrow             Thaddeus Jimenez MD  Hospitalist Service  [facility]  Securely message with [employer] (more info)  Text page via [employer] Paging/Directory   ______________________________________________________________________    Interval History   No acute events overnight.    No report of chest pain, shortness of breath, or other new complaints. Discussed plan of care with patient. Answered all questions to patient's verbalized understanding and satisfaction.    Physical Exam   Vital Signs: Temp: 98.3  F (36.8  C) Temp src: Oral BP: (!) 176/82 Pulse: 71   Resp: 24 SpO2: 96 % O2 Device: Nasal cannula Oxygen Delivery: 2 LPM  Weight: 120 lbs 0 oz    GENERAL:  Alert, appears comfortable, in no acute distress, appears stated age, on 2 L of O2 via NC   HEAD:  Normocephalic, without obvious abnormality, atraumatic   EYES:  Conjunctiva/corneas clear, no scleral icterus, EOM's appears intact grossly   NOSE: Nares normal, septum midline,  mucosa normal, no drainage   THROAT: Lips, mucosa, and tongue appear normal   NECK: Symmetrical, trachea appears midline   BACK:   Symmetric, no curvature noted   LUNGS:   Symmetric chest rise on inhalation, respirations unlabored   CHEST WALL:  No apparent deformity   HEART:  No thrills or heaves visualized   ABDOMEN:   No masses or organomegaly apparent; non-scaphoid   EXTREMITIES: Extremities appear normal, atraumatic, no cyanosis   SKIN: No exanthems in the visualized areas   NEURO: Alert and oriented x4, moves all four extremities freely and spontaneously   PSYCH: Cooperative, behavior is appropriate       Medical Decision Making       52 MINUTES SPENT BY ME on the date of service doing chart review, history, exam, documentation & further activities per the note.      Data     I have personally reviewed the following data over the past 24 hrs:    5.3  \   14.9   / 188     142 105 12.5 /  126 (H)   4.5 27 0.65 \     ALT: 12 AST: 22 AP: 86 TBILI: 0.7   ALB: 3.8 TOT PROTEIN: 6.2 (L) LIPASE: N/A     Trop: 10 BNP: 418     TSH: 0.69 T4: N/A A1C: N/A     Procal: 0.04 CRP: 16.10 (H) Lactic Acid: 1.0       INR:  1.08 PTT:  N/A   D-dimer:  N/A Fibrinogen:  N/A       Imaging results reviewed over the past 24 hrs:   Recent Results (from the past 24 hours)   US Lower Extremity Venous Duplex Right    Narrative    EXAM: US LOWER EXTREMITY VENOUS DUPLEX RIGHT  LOCATION: [facility]  DATE: 12/18/2024    INDICATION: right leg swelling  COMPARISON: 10/15/2024, 10/3/2024, 8/8/2024  TECHNIQUE: Venous Duplex ultrasound of the right lower extremity with and without compression, augmentation and duplex. Color flow and spectral Doppler with waveform analysis performed.    FINDINGS: Exam includes the common femoral, femoral, popliteal, and contralateral common femoral veins as well as segmentally visualized deep calf veins and greater saphenous vein.     RIGHT: No deep vein thrombosis. No superficial  thrombophlebitis. No popliteal cyst.      Impression    IMPRESSION:  1.  No deep venous thrombosis in the right leg.   CT Chest Pulmonary Embolism w Contrast    Narrative    EXAM: CT CHEST PULMONARY EMBOLISM W CONTRAST  LOCATION: [facility]  DATE: 12/18/2024    INDICATION: SOB, hypoxia, history of DVT.  COMPARISON: 8/8/2024.  TECHNIQUE: CT chest pulmonary angiogram during arterial phase injection of IV contrast. Multiplanar reformats and MIP reconstructions were performed. Dose reduction techniques were used.   CONTRAST: Isovue 370 75 mL.    FINDINGS:  ANGIOGRAM CHEST: There are a few small segmental and subsegmental pulmonary emboli within the right middle and lower lobes. Mild prominence of the central pulmonary arteries compatible with an element of pulmonary arterial hypertension.     LUNGS AND PLEURA: Emphysema. Small pleural effusions with mild bibasilar infiltrates.    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    CORONARY ARTERY CALCIFICATION: Severe.    UPPER ABDOMEN: Normal.    MUSCULOSKELETAL: Normal.      Impression    IMPRESSION:  1.  There are a few small segmental and subsegmental pulmonary emboli in the right middle and lower lobes.    2.  Mild prominence of the central pulmonary arteries is stable compatible with an element of pulmonary arterial hypertension.    3.  Emphysema.    4.  Small pleural effusions. Mild bibasilar infiltrates.    5.  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    6.  Results given to Dr. Pabon.    NOTE: ABNORMAL REPORT    THE DICTATION ABOVE DESCRIBES AN ABNORMALITY FOR WHICH FOLLOW-UP IS NEEDED.

## 2024-12-19 NOTE — H&P
"[facility]    History and Physical - Hospitalist Service       Date of Admission:  12/18/2024    Assessment & Plan      Sharon Weinberg is a 79 year old female with PMHx significant for previous DVT of RLE (8/2024), emphysema, lower extremity edema, hypertension, history of sinus tachycardia, depression, anxiety, insomnia, restless leg syndrome, osteoporosis who was admitted on 12/18/2024 for initiation of anticoagulation and oxygen support for acute pulmonary emboli.     ED Course: Hypertensive on arrival to /11, now improved somewhat to systolic 150s, hypoxic on RA with improvement in sats to mid 90s on 3L/NC (no baseline oxygen requirement), afebrile. Lab workup including BMP, LFT, CBC, and Mg grossly unremarkable. CRP elevated at 16.10. INR 1.08. Lactic acid 1.0. Procalcitonin 0.04. Initial troponin T normal at 12, delta troponin 10. proBNP normal at 418. TSH 0.69. VBG with pH 7.4, pCO2 mildly elevated at 53, bicarb elevated at 33. US of RLE negative for DVT. CT Chest PE Study demonstrates \"There are a few small segmental and subsegmental pulmonary emboli in the right middle and lower lobes. Small pleural effusions. Mild bibasilar infiltrates.\" ECG shows sinus rhythm, left anterior fascicular block, no obvious ischemic changes and no significant change compared to previous study 8/2024 per my read. Given DuoNeb x 2, 125 mg IV Solu-Medrol, 2 g IV magnesium.      Acute pulmonary emboli  Acute respiratory failure with hypoxia  History of DVT of RLE (8/2024)  Presents with two days of increasing dyspnea with minimal exertion. Requiring 3L/NC oxygen supplement to maintain sats in mid 90s in ED, no prior oxygen requirement at baseline. Does have history of DVT of RLE in 8/2024 and was treated with course of Eliquis, which was discontinued in 10/2024 after repeat US showed resolution of DVT.   Chest CT in ED reveals a few small PE in RML and RLL. US of RLE negative for DVT. Per chart " review, there do not seem to be any clear provoking factors for initial DVT in 8/2024 or PE now.  - Start anticoagulation with Eliquis  - Supplemental oxygen as needed to maintain sats >90%  - Continuous pulse oximetry  - Telemetry  - AM labs: CBC, BMP    Emphysema  Does report shortness of breath and hypoxia, but this is more likely secondary to acute PE as above. No increased cough, no fevers. Do not suspect acute exacerbation of COPD at this time. S/p 125 mg IV Solu-Medrol and DuoNeb x 2 in ED.  - Hold off on further steroids or antibiotics for now  - Resume home PRN albuterol inhaler    Bilateral lower extremity edema  1-2+ pitting edema to bilateral lower extremities on exam, R>L. US of RLE negative for DVT. proBNP is not elevated, so do not suspect acute HF at this time. Patient has been seen by [facility] Cardiology in 5/2024 for this, who suggested consideration of low dose hydrochlorothiazide for edema and HTN (this was not started though).    Hypertension  History of sinus tachycardia  - Resume home propranolol with hold parameters    Depression  Anxiety  Insomnia   Follows with Psychiatry at [facility] for medication management.  - Resume home Lexapro, Ativan, Remeron, propranolol    Restless legs syndrome  - Resume home Mirapex    Mild cognitive impairment  - Resume home Aricept    Dry eyes  - Resume home eye drops    Osteoporosis - Prescribed once weekly alendronate several months ago, but has not yet started taking.          Diet: Combination Diet Regular Diet Adult  DVT Prophylaxis: DOAC  Bowles Catheter: Not present  Lines: None     Cardiac Monitoring: ACTIVE order. Indication: acute pulmonary emboli, hypoxia  Code Status: Full Code    Clinically Significant Risk Factors Present on Admission                   # Hypertension: Noted on problem list                      Disposition Plan     Medically Ready for Discharge: Anticipated in 2-4 Days         The patient's care was discussed with the Attending Physician,  Dr. Parviz Nash .    Karolina Chahal PA-C  Hospitalist Service  [facility]  Securely message with [employer] (more info)  Text page via [facility] Paging/Directory     ______________________________________________________________________    Chief Complaint   Shortness of breath    History is obtained from the patient    History of Present Illness   Sharon Weinberg is a 79 year old female with PMHx significant for previous DVT of RLE (8/2024), emphysema, lower extremity edema, hypertension, history of sinus tachycardia, depression, anxiety, insomnia, restless leg syndrome, osteoporosis who presented to the ED for evaluation of shortness of breath.    Patient reports increased shortness of breath starting yesterday, which was initially tolerable but has progressively gotten worse today.  She has become short of breath with minimal activity, like walking around her apartment.  She denies shortness of breath at rest.  She is not on oxygen supplement at baseline.  She tried using her as needed albuterol inhaler at home without significant improvement in her breathing.  She was concerned, so she alerted staff at her facility that she wanted to be seen in the ER.  Patient denies any recent long travel, surgery, injury, or illness.  She has history of DVT in 8/2024 (detailed below), but denies any clots prior to that.    Patient lives in an assisted living facility with her  in an apartment.  She notes that she does not get much assistance, but her  does.  Her  and son help set up her medications and ensure that she is taking them appropriately.  Patient ambulates with either a walker or a cane at baseline.      Per chart review, patient was seen in [facility] ED on 8/8/2024 for shortness of breath and right leg cramping.  US of RLE revealed a small DVT.  CTA PE study was negative for pulmonary emboli.  Patient was given IV Solu-Medrol and DuoNeb out of suspicion for possible COPD  exacerbation, with improvement in shortness of breath.  She was started on Eliquis for anticoagulation as well as prednisone x 5 days for COPD exacerbation.    Patient underwent a repeat US of RLE on 10/3/2024, which showed resolution of prior DVT.  Eliquis was discontinued at that time.      Past Medical History    Past Medical History:   Diagnosis Date    Anxiety     Benign essential hypertension     COPD (chronic obstructive pulmonary disease) (H)     Depression     History of DVT (deep vein thrombosis) 08/2024    treated with Eliquis 8/2024 through 10/2024    Insomnia     Osteoporosis     Restless legs syndrome        Past Surgical History   History reviewed. No pertinent surgical history.    Prior to Admission Medications   Prior to Admission Medications   Prescriptions Last Dose Informant Patient Reported? Taking?   LORazepam (ATIVAN) 0.5 MG tablet 12/17/2024 Bedtime  Yes Yes   Sig: Take 0.5 mg by mouth at bedtime.   Vitamin D3 (VITAMIN D, CHOLECALCIFEROL,) 25 mcg (1000 units) tablet 12/17/2024 Morning  Yes Yes   Sig: Take 1 tablet by mouth every morning.   acetaminophen (TYLENOL) 325 MG tablet More than a month  Yes Yes   Sig: Take 650 mg by mouth every 6 hours as needed for mild pain   albuterol (PROAIR HFA/PROVENTIL HFA/VENTOLIN HFA) 108 (90 Base) MCG/ACT inhaler 12/18/2024 Morning  Yes Yes   Sig: Inhale 1-2 puffs into the lungs every 4 hours as needed   alendronate (FOSAMAX) 70 MG tablet   Yes Yes   Sig: Take 70 mg by mouth every 7 days   calcium carbonate 500 mg, elemental, (OSCAL) 500 MG tablet 12/17/2024 Morning  Yes Yes   Sig: Take 1 tablet by mouth 2 times daily   carboxymethylcellulose PF (REFRESH PLUS) 0.5 % ophthalmic solution Past Week  Yes Yes   Sig: Place 1 drop into both eyes 3 times daily.   cycloSPORINE (RESTASIS) 0.05 % ophthalmic emulsion 12/17/2024 Bedtime  Yes Yes   Sig: Place 1 drop into both eyes every 12 hours   donepezil (ARICEPT) 5 MG tablet 12/17/2024 Bedtime  Yes Yes   Sig: Take 5  mg by mouth at bedtime.   escitalopram (LEXAPRO) 10 MG tablet 12/17/2024 Morning  Yes Yes   Sig: Take 10 mg by mouth every morning.   mirtazapine (REMERON) 30 MG tablet 12/17/2024 Bedtime  Yes Yes   Sig: Take 30 mg by mouth At Bedtime   pramipexole (MIRAPEX) 0.25 MG tablet 12/17/2024 Bedtime  Yes Yes   Sig: Take 0.25 mg by mouth at bedtime.   propranolol (INDERAL) 10 MG tablet 12/17/2024 Morning  Yes Yes   Sig: Take 10 mg by mouth every morning. (Also taking 20 mg QPM with dinner)   propranolol (INDERAL) 10 MG tablet 12/17/2024 Evening  Yes Yes   Sig: Take 20 mg by mouth daily (with dinner). (Also taking 10 mg QAM)      Facility-Administered Medications: None        Review of Systems    The 10 point Review of Systems is negative other than noted in the HPI or here.     Allergies   Allergies   Allergen Reactions    Penicillins Palpitations, Anaphylaxis, Hives and Difficulty breathing    Cephalexin Hives    Doxycycline Itching and Rash    Sulfa Antibiotics Hives    Sulfatolamide Hives        Physical Exam   Vital Signs: Temp: 98.5  F (36.9  C) Temp src: Oral BP: (!) 156/67 Pulse: 71   Resp: 26 SpO2: 95 % O2 Device: Nasal cannula Oxygen Delivery: 3 LPM  Weight: 120 lbs 0 oz    General Appearance: Awake, alert, in no acute distress.  Respiratory: Clear to auscultation bilaterally. Normal respiratory effort on 3L/NC with sats in mid-90s.  Cardiovascular: Regular rate and rhythm, no murmur. Extremities are warm and well-perfused.  GI: Soft, non-tender, non-distended. Normal bowel sounds.  Skin: No obvious rashes or lesions on observed skin.  Musculoskeletal: Able to move all extremities freely. 1+ pitting edema to LLE, 2+ pitting edema to RLE.  Neurologic: Alert and oriented x 3. Strength and sensation are grossly equal and intact in bilateral upper and lower extremities.  Psychiatric: Calm, pleasant, cooperative with exam.      Medical Decision Making       65 MINUTES SPENT BY ME on the date of service doing chart  review, history, exam, documentation & further activities per the note.      Data     I have personally reviewed the following data over the past 24 hrs:    7.5  \   14.5   / 190     143 105 12.0 /  95   4.1 28 0.75 \     ALT: 12 AST: 22 AP: 86 TBILI: 0.7   ALB: 3.8 TOT PROTEIN: 6.2 (L) LIPASE: N/A     Trop: 10 BNP: 418     TSH: 0.69 T4: N/A A1C: N/A     Procal: 0.04 CRP: 16.10 (H) Lactic Acid: 1.0       INR:  1.08 PTT:  N/A   D-dimer:  N/A Fibrinogen:  N/A       Imaging results reviewed over the past 24 hrs:   Recent Results (from the past 24 hours)   US Lower Extremity Venous Duplex Right    Narrative    EXAM: US LOWER EXTREMITY VENOUS DUPLEX RIGHT  LOCATION: [facility]  DATE: 12/18/2024    INDICATION: right leg swelling  COMPARISON: 10/15/2024, 10/3/2024, 8/8/2024  TECHNIQUE: Venous Duplex ultrasound of the right lower extremity with and without compression, augmentation and duplex. Color flow and spectral Doppler with waveform analysis performed.    FINDINGS: Exam includes the common femoral, femoral, popliteal, and contralateral common femoral veins as well as segmentally visualized deep calf veins and greater saphenous vein.     RIGHT: No deep vein thrombosis. No superficial thrombophlebitis. No popliteal cyst.      Impression    IMPRESSION:  1.  No deep venous thrombosis in the right leg.   CT Chest Pulmonary Embolism w Contrast    Narrative    EXAM: CT CHEST PULMONARY EMBOLISM W CONTRAST  LOCATION: [facility]  DATE: 12/18/2024    INDICATION: SOB, hypoxia, history of DVT.  COMPARISON: 8/8/2024.  TECHNIQUE: CT chest pulmonary angiogram during arterial phase injection of IV contrast. Multiplanar reformats and MIP reconstructions were performed. Dose reduction techniques were used.   CONTRAST: Isovue 370 75 mL.    FINDINGS:  ANGIOGRAM CHEST: There are a few small segmental and subsegmental pulmonary emboli within the right middle and lower lobes. Mild  prominence of the central pulmonary arteries compatible with an element of pulmonary arterial hypertension.     LUNGS AND PLEURA: Emphysema. Small pleural effusions with mild bibasilar infiltrates.    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    CORONARY ARTERY CALCIFICATION: Severe.    UPPER ABDOMEN: Normal.    MUSCULOSKELETAL: Normal.      Impression    IMPRESSION:  1.  There are a few small segmental and subsegmental pulmonary emboli in the right middle and lower lobes.    2.  Mild prominence of the central pulmonary arteries is stable compatible with an element of pulmonary arterial hypertension.    3.  Emphysema.    4.  Small pleural effusions. Mild bibasilar infiltrates.    5.  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    6.  Results given to Dr. Pabon.    NOTE: ABNORMAL REPORT    THE DICTATION ABOVE DESCRIBES AN ABNORMALITY FOR WHICH FOLLOW-UP IS NEEDED.

## 2024-12-19 NOTE — MEDICATION SCRIBE - ADMISSION MEDICATION HISTORY
Medication Scribe Admission Medication History    Admission medication history is complete. The information provided in this note is only as accurate as the sources available at the time of the update.    Information Source(s): Patient, Family member, and CareEverywhere/SureScripts via in-person and phone    Pertinent Information: Initially called [facility] for the patient's medication list, but they reported that she manages her own medications with [facility] in [place]. Spoke with the patient and her son to determine a medication history.     Did not take AM medications this morning PTA due to not feeling well. All medications taken yesterday.     Previously was on apixaban, but discontinued in October 2024.     Has not started alendronate 70 mg yet. Has a recent gabapentin 300 mg Rx with a day supply suggesting 2 capsules total once daily. Both the patient and her son do not recognize this and has not been taking it PTA. Gabapentin potentially has not been started as well. (Prescribed by psych CNP - no records available in the patient's chart regarding this).    Changes made to PTA medication list:  Added:   Donepezil 5 mg  Deleted:   Flaxseed powder  Fluticasone-salmeterol 113-14 mcg/act inhaler (Airduo Respiclick)  Prednisone 20 mg  Changed:   Lorazepam 0.5 mg BID --> 1 at bedtime   Pramipexole 1 mg 2 at bedtime --> 0.25 mg one at bedtime  Propranolol 20 mg 1 AM, 2 QPM --> 10 mg 1 AM, 2 PM    Allergies reviewed with patient and updates made in EHR: yes    Medication History Completed By: Tashi Juarez 12/18/2024 6:49 PM    PTA Med List   Medication Sig Note Last Dose/Taking    acetaminophen (TYLENOL) 325 MG tablet Take 650 mg by mouth every 6 hours as needed for mild pain  More than a month    albuterol (PROAIR HFA/PROVENTIL HFA/VENTOLIN HFA) 108 (90 Base) MCG/ACT inhaler Inhale 1-2 puffs into the lungs every 4 hours as needed  12/18/2024 Morning    alendronate (FOSAMAX) 70 MG tablet Take 70  mg by mouth every 7 days 12/18/2024: Has not started Taking    calcium carbonate 500 mg, elemental, (OSCAL) 500 MG tablet Take 1 tablet by mouth 2 times daily  12/17/2024 Morning    carboxymethylcellulose PF (REFRESH PLUS) 0.5 % ophthalmic solution Place 1 drop into both eyes 3 times daily.  Past Week    cycloSPORINE (RESTASIS) 0.05 % ophthalmic emulsion Place 1 drop into both eyes every 12 hours  12/17/2024 Bedtime    donepezil (ARICEPT) 5 MG tablet Take 5 mg by mouth at bedtime.  12/17/2024 Bedtime    escitalopram (LEXAPRO) 10 MG tablet Take 10 mg by mouth every morning.  12/17/2024 Morning    LORazepam (ATIVAN) 0.5 MG tablet Take 0.5 mg by mouth at bedtime.  12/17/2024 Bedtime    mirtazapine (REMERON) 30 MG tablet Take 30 mg by mouth At Bedtime  12/17/2024 Bedtime    pramipexole (MIRAPEX) 0.25 MG tablet Take 0.25 mg by mouth at bedtime.  12/17/2024 Bedtime    propranolol (INDERAL) 10 MG tablet Take 10 mg by mouth every morning. (Also taking 20 mg QPM with dinner)  12/17/2024 Morning    propranolol (INDERAL) 10 MG tablet Take 20 mg by mouth daily (with dinner). (Also taking 10 mg QAM)  12/17/2024 Evening    Vitamin D3 (VITAMIN D, CHOLECALCIFEROL,) 25 mcg (1000 units) tablet Take 1 tablet by mouth every morning.  12/17/2024 Morning

## 2024-12-19 NOTE — ED NOTES
Introduced self to patient. Whiteboard updated. Plan of care and length of time discussed with patient. Pain assessed. Son at bedside

## 2024-12-20 VITALS
OXYGEN SATURATION: 90 % | SYSTOLIC BLOOD PRESSURE: 197 MMHG | HEIGHT: 62 IN | DIASTOLIC BLOOD PRESSURE: 92 MMHG | WEIGHT: 120 LBS | TEMPERATURE: 98.9 F | RESPIRATION RATE: 21 BRPM | HEART RATE: 78 BPM | BODY MASS INDEX: 22.08 KG/M2

## 2024-12-20 VITALS
BODY MASS INDEX: 22.08 KG/M2 | OXYGEN SATURATION: 94 % | SYSTOLIC BLOOD PRESSURE: 171 MMHG | HEIGHT: 62 IN | WEIGHT: 120 LBS | HEART RATE: 59 BPM | DIASTOLIC BLOOD PRESSURE: 77 MMHG | TEMPERATURE: 97.7 F | RESPIRATION RATE: 44 BRPM

## 2024-12-20 PROCEDURE — 250N000013 HC RX MED GY IP 250 OP 250 PS 637: Performed by: EMERGENCY MEDICINE

## 2024-12-20 PROCEDURE — 99239 HOSP IP/OBS DSCHRG MGMT >30: CPT | Performed by: HOSPITALIST

## 2024-12-20 PROCEDURE — 250N000013 HC RX MED GY IP 250 OP 250 PS 637

## 2024-12-20 RX ADMIN — APIXABAN 10 MG: 5 TABLET, FILM COATED ORAL at 08:10

## 2024-12-20 RX ADMIN — PROPRANOLOL HYDROCHLORIDE 10 MG: 10 TABLET ORAL at 08:10

## 2024-12-20 RX ADMIN — Medication 1 DROP: at 08:13

## 2024-12-20 RX ADMIN — Medication 1 DROP: at 14:44

## 2024-12-20 RX ADMIN — ALBUTEROL SULFATE 1 PUFF: 90 AEROSOL, METERED RESPIRATORY (INHALATION) at 14:44

## 2024-12-20 RX ADMIN — ESCITALOPRAM OXALATE 10 MG: 10 TABLET ORAL at 08:12

## 2024-12-20 RX ADMIN — CYCLOSPORINE 1 DROP: 0.5 EMULSION OPHTHALMIC at 11:18

## 2024-12-20 ASSESSMENT — ACTIVITIES OF DAILY LIVING (ADL)
ADLS_ACUITY_SCORE: 55
ADLS_ACUITY_SCORE: 53
ADLS_ACUITY_SCORE: 55
ADLS_ACUITY_SCORE: 53
ADLS_ACUITY_SCORE: 55
ADLS_ACUITY_SCORE: 53
ADLS_ACUITY_SCORE: 53
ADLS_ACUITY_SCORE: 57
ADLS_ACUITY_SCORE: 55
ADLS_ACUITY_SCORE: 53
ADLS_ACUITY_SCORE: 55
ADLS_ACUITY_SCORE: 55
ADLS_ACUITY_SCORE: 53
ADLS_ACUITY_SCORE: 55
ADLS_ACUITY_SCORE: 53
ADLS_ACUITY_SCORE: 57

## 2024-12-20 NOTE — PLAN OF CARE
Vss other than BP which is elevated in the 150-170's and addressed with PRN medications. Provider aware. HR is also intermittently mariposa in the high 50's. Patient weaned from 1L and trialing on RA. Currently sating at 90.     Discharging home via wheelchair transport. AVS reviewed with patient and all questions answered. PIV removed. All belongings packed.

## 2024-12-20 NOTE — PLAN OF CARE
"  Problem: Adult Inpatient Plan of Care  Goal: Plan of Care Review  Description: The Plan of Care Review/Shift note should be completed every shift.  The Outcome Evaluation is a brief statement about your assessment that the patient is improving, declining, or no change.  This information will be displayed automatically on your shift  note.  12/19/2024 1934 by Monster Bernstein RN  Outcome: Progressing  12/19/2024 1934 by Monster Bernstein RN  Outcome: Not Progressing  Goal: Patient-Specific Goal (Individualized)  Description: You can add care plan individualizations to a care plan. Examples of Individualization might be:  \"Parent requests to be called daily at 9am for status\", \"I have a hard time hearing out of my right ear\", or \"Do not touch me to wake me up as it startles  me\".  12/19/2024 1934 by Monster Bernstein RN  Outcome: Progressing  12/19/2024 1934 by Monster Bernstein RN  Outcome: Not Progressing  Goal: Absence of Hospital-Acquired Illness or Injury  12/19/2024 1934 by Monster Bernstein RN  Outcome: Progressing  12/19/2024 1934 by Monster Bernstein RN  Outcome: Not Progressing  Goal: Optimal Comfort and Wellbeing  12/19/2024 1934 by Monster Bernstein RN  Outcome: Progressing  12/19/2024 1934 by Monster Bernstein RN  Outcome: Not Progressing  Goal: Readiness for Transition of Care  12/19/2024 1934 by Monster Bernstein RN  Outcome: Progressing  12/19/2024 1934 by Monster Bernstein RN  Outcome: Not Progressing   Patient is alert and oriented x3. Still needing oxygen at 2 liters nasal canula. Attempted to wean off oxygen but josi drop to 80s with activity.  Monster Bernstein RN  12/19/2024  7:35 PM    "

## 2024-12-20 NOTE — PLAN OF CARE
Problem: Adult Inpatient Plan of Care  Goal: Readiness for Transition of Care  Outcome: Progressing     AO X4. VSS on 1L O2 overnight (wean down from 2L), except elevated BP did not meet parameter to notify provider. Tele: SB. Denied pain. SOB with activity. LS wheezing expiratory. IV SL. Purewick in place. Good urine output. Likely discharge today. Makes needs known. Call light within reach.

## 2024-12-20 NOTE — DISCHARGE SUMMARY
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                Unresulted Labs Ordered in the Past 30 Days of this Admission       No orders found from 11/18/2024 to 12/19/2024.        These results will be followed up by NA    Discharge Disposition   Discharged to home  Condition at discharge: Good    Hospital Course   Sharon Weinberg is a 79 year old female with PMHx significant for previous DVT of RLE (8/2024), emphysema, lower extremity edema, hypertension, history of sinus tachycardia, depression, anxiety, insomnia, restless leg syndrome, osteoporosis who was admitted on 12/18/2024 for initiation of anticoagulation and oxygen support for acute pulmonary emboli.     ED Course: Hypertensive on arrival to /11, hypoxic on RA with improvement in sats to mid 90s on 3L/NC (no baseline oxygen requirement), afebrile. Lab workup including BMP, LFT, CBC, and Mg grossly unremarkable. CRP elevated at 16.10. INR 1.08. Lactic acid 1.0. Procalcitonin 0.04 not elevated. Initial troponin T normal at 12, delta troponin 10. proBNP normal at 418. TSH 0.69. VBG with pH 7.4, pCO2 mildly elevated at 53, bicarb elevated at 33. US of RLE negative for DVT. CT Chest PE Study demonstrated acute pulmonary emboli in RLL and RML. Given DuoNeb x 2, 125 mg IV  Solu-Medrol, 2 g IV magnesium initially. Admitted to hospital medicine for monitoring, and supportive cares. No wheezing or other symptoms ongoing to suggest COPDe or another infection; she was started on Apixaban at PE treatment dosing and her oxygen slowly weaned. By day of discharged, she was completely weaned off of oxygen and back to her baseline. PCP follow-up recommended.       Consultations This Hospital Stay   None    Code Status   Full Code    Time Spent on this Encounter   I, Thaddeus Jimenez MD, personally saw the patient today and spent greater than 30 minutes discharging this patient.       Thaddeus Jimenez MD  [facility] EMERGENCY ROOM  [address] [address]  Phone: [phone]  Fax: [phone]  ______________________________________________________________________    Physical Exam   Vital Signs: Temp: 97.4  F (36.3  C) Temp src: Oral BP: (!) 170/81 Pulse: 60   Resp: 21 SpO2: 90 % O2 Device: None (Room air) Oxygen Delivery: 1 LPM  Weight: 120 lbs 0 oz  GENERAL:  Alert, appears comfortable, in no acute distress, appears stated age, on ambient room air   HEAD:  Normocephalic, without obvious abnormality, atraumatic   EYES:  Conjunctiva/corneas clear, no scleral icterus, EOM's appears intact grossly   NOSE: Nares normal, septum midline, mucosa normal, no drainage   THROAT: Lips, mucosa, and tongue appear normal   NECK: Symmetrical, trachea appears midline   BACK:   Symmetric, no curvature noted   LUNGS:   Symmetric chest rise on inhalation, respirations unlabored   CHEST WALL:  No apparent deformity   HEART:  No thrills or heaves visualized   ABDOMEN:   No masses or organomegaly apparent; non-scaphoid   EXTREMITIES: Extremities appear normal, atraumatic, no cyanosis   SKIN: No exanthems in the visualized areas   NEURO: Alert and oriented x4, moves all four extremities freely and spontaneously   PSYCH: Cooperative, behavior is appropriate       "  Primary Care Physician   Greer Lay    Discharge Orders      Reason for your hospital stay    Pulmonary embolism (blood clots in the blood vessels of your lungs) and starting anticoagulation (or \"blood thinners\").     Follow-up and recommended labs and tests     Follow up with primary care provider, Greer Lay, within 7 days for hospital follow- up.     Activity    Your activity upon discharge: activity as tolerated and no driving for today     Diet    Follow this diet upon discharge: Orders Placed This Encounter      Combination Diet Regular Diet Adult         Significant Results and Procedures   Most Recent 3 CBC's:  Recent Labs   Lab Test 12/19/24  0634 12/18/24  1747 08/08/24 2021   WBC 5.3 7.5 8.8   HGB 14.9 14.5 14.4   MCV 98 99 99    190 205     Most Recent 3 BMP's:  Recent Labs   Lab Test 12/19/24  0634 12/18/24 1747 08/08/24 2021    143 143   POTASSIUM 4.5 4.1 4.1   CHLORIDE 105 105 105   CO2 27 28 31*   BUN 12.5 12.0 21.1   CR 0.65 0.75 1.03*   ANIONGAP 10 10 7   NATALY 9.3 9.4 9.6   * 95 125*   ,   Results for orders placed or performed during the hospital encounter of 12/18/24   CT Chest Pulmonary Embolism w Contrast    Narrative    EXAM: CT CHEST PULMONARY EMBOLISM W CONTRAST  LOCATION: [facility]  DATE: 12/18/2024    INDICATION: SOB, hypoxia, history of DVT.  COMPARISON: 8/8/2024.  TECHNIQUE: CT chest pulmonary angiogram during arterial phase injection of IV contrast. Multiplanar reformats and MIP reconstructions were performed. Dose reduction techniques were used.   CONTRAST: Isovue 370 75 mL.    FINDINGS:  ANGIOGRAM CHEST: There are a few small segmental and subsegmental pulmonary emboli within the right middle and lower lobes. Mild prominence of the central pulmonary arteries compatible with an element of pulmonary arterial hypertension.     LUNGS AND PLEURA: Emphysema. Small pleural effusions with mild bibasilar " infiltrates.    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    CORONARY ARTERY CALCIFICATION: Severe.    UPPER ABDOMEN: Normal.    MUSCULOSKELETAL: Normal.      Impression    IMPRESSION:  1.  There are a few small segmental and subsegmental pulmonary emboli in the right middle and lower lobes.    2.  Mild prominence of the central pulmonary arteries is stable compatible with an element of pulmonary arterial hypertension.    3.  Emphysema.    4.  Small pleural effusions. Mild bibasilar infiltrates.    5.  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    6.  Results given to Dr. Pabon.    NOTE: ABNORMAL REPORT    THE DICTATION ABOVE DESCRIBES AN ABNORMALITY FOR WHICH FOLLOW-UP IS NEEDED.    US Lower Extremity Venous Duplex Right    Narrative    EXAM: US LOWER EXTREMITY VENOUS DUPLEX RIGHT  LOCATION: [facility]  DATE: 12/18/2024    INDICATION: right leg swelling  COMPARISON: 10/15/2024, 10/3/2024, 8/8/2024  TECHNIQUE: Venous Duplex ultrasound of the right lower extremity with and without compression, augmentation and duplex. Color flow and spectral Doppler with waveform analysis performed.    FINDINGS: Exam includes the common femoral, femoral, popliteal, and contralateral common femoral veins as well as segmentally visualized deep calf veins and greater saphenous vein.     RIGHT: No deep vein thrombosis. No superficial thrombophlebitis. No popliteal cyst.      Impression    IMPRESSION:  1.  No deep venous thrombosis in the right leg.       Discharge Medications   Current Discharge Medication List        START taking these medications    Details   apixaban ANTICOAGULANT (ELIQUIS) 5 MG tablet Take 2 tablets (10 mg) by mouth 2 times daily for 5 days, THEN 1 tablet (5 mg) 2 times daily for 25 days.  Qty: 70 tablet, Refills: 0    Associated Diagnoses: Other acute pulmonary embolism without acute cor pulmonale (H)            CONTINUE these medications which have NOT CHANGED    Details   acetaminophen (TYLENOL) 325 MG tablet Take 650 mg by mouth every 6 hours as needed for mild pain      albuterol (PROAIR HFA/PROVENTIL HFA/VENTOLIN HFA) 108 (90 Base) MCG/ACT inhaler Inhale 1-2 puffs into the lungs every 4 hours as needed      alendronate (FOSAMAX) 70 MG tablet Take 70 mg by mouth every 7 days      calcium carbonate 500 mg, elemental, (OSCAL) 500 MG tablet Take 1 tablet by mouth 2 times daily      carboxymethylcellulose PF (REFRESH PLUS) 0.5 % ophthalmic solution Place 1 drop into both eyes 3 times daily.      cycloSPORINE (RESTASIS) 0.05 % ophthalmic emulsion Place 1 drop into both eyes every 12 hours      donepezil (ARICEPT) 5 MG tablet Take 5 mg by mouth at bedtime.      escitalopram (LEXAPRO) 10 MG tablet Take 10 mg by mouth every morning.      LORazepam (ATIVAN) 0.5 MG tablet Take 0.5 mg by mouth at bedtime.      mirtazapine (REMERON) 30 MG tablet Take 30 mg by mouth At Bedtime      pramipexole (MIRAPEX) 0.25 MG tablet Take 0.25 mg by mouth at bedtime.      !! propranolol (INDERAL) 10 MG tablet Take 10 mg by mouth every morning. (Also taking 20 mg QPM with dinner)      !! propranolol (INDERAL) 10 MG tablet Take 20 mg by mouth daily (with dinner). (Also taking 10 mg QAM)      Vitamin D3 (VITAMIN D, CHOLECALCIFEROL,) 25 mcg (1000 units) tablet Take 1 tablet by mouth every morning.       !! - Potential duplicate medications found. Please discuss with provider.        Allergies   Allergies   Allergen Reactions    Penicillins Palpitations, Anaphylaxis, Hives and Difficulty breathing    Cephalexin Hives    Doxycycline Itching and Rash    Sulfa Antibiotics Hives    Sulfatolamide Hives

## 2025-03-16 ENCOUNTER — HEALTH MAINTENANCE LETTER (OUTPATIENT)
Age: 80
End: 2025-03-16

## 2025-04-02 DIAGNOSIS — J44.9 COPD (CHRONIC OBSTRUCTIVE PULMONARY DISEASE) (H): ICD-10-CM

## 2025-04-02 DIAGNOSIS — Z86.711 HISTORY OF PULMONARY EMBOLUS (PE): ICD-10-CM

## 2025-04-02 DIAGNOSIS — R06.02 SOB (SHORTNESS OF BREATH): Primary | ICD-10-CM

## 2025-05-22 ENCOUNTER — OFFICE VISIT (OUTPATIENT)
Dept: PULMONOLOGY | Facility: CLINIC | Age: 80
End: 2025-05-22
Payer: COMMERCIAL

## 2025-05-22 ENCOUNTER — TELEPHONE (OUTPATIENT)
Dept: PULMONOLOGY | Facility: CLINIC | Age: 80
End: 2025-05-22

## 2025-05-22 VITALS
OXYGEN SATURATION: 95 % | HEIGHT: 62 IN | HEART RATE: 77 BPM | SYSTOLIC BLOOD PRESSURE: 148 MMHG | DIASTOLIC BLOOD PRESSURE: 72 MMHG | WEIGHT: 147 LBS | BODY MASS INDEX: 27.05 KG/M2

## 2025-05-22 DIAGNOSIS — R06.02 SOB (SHORTNESS OF BREATH): ICD-10-CM

## 2025-05-22 DIAGNOSIS — J44.1 COPD EXACERBATION (H): ICD-10-CM

## 2025-05-22 DIAGNOSIS — J44.9 CHRONIC OBSTRUCTIVE PULMONARY DISEASE, UNSPECIFIED COPD TYPE (H): Primary | ICD-10-CM

## 2025-05-22 DIAGNOSIS — Z86.711 HISTORY OF PULMONARY EMBOLUS (PE): ICD-10-CM

## 2025-05-22 DIAGNOSIS — J44.9 COPD (CHRONIC OBSTRUCTIVE PULMONARY DISEASE) (H): ICD-10-CM

## 2025-05-22 DIAGNOSIS — I26.99 OTHER ACUTE PULMONARY EMBOLISM WITHOUT ACUTE COR PULMONALE (H): ICD-10-CM

## 2025-05-22 LAB
DLCOCOR-%PRED-PRE: 51 %
DLCOCOR-PRE: 9.08 ML/MIN/MMHG
DLCOUNC-%PRED-PRE: 53 %
DLCOUNC-PRE: 9.44 ML/MIN/MMHG
DLCOUNC-PRED: 17.5 ML/MIN/MMHG
ERV-%PRED-PRE: 13 %
ERV-PRE: 0.1 L
ERV-PRED: 0.81 L
EXPTIME-PRE: 6.93 SEC
FEF2575-%PRED-POST: 12 %
FEF2575-%PRED-PRE: 30 %
FEF2575-POST: 0.19 L/SEC
FEF2575-PRE: 0.45 L/SEC
FEF2575-PRED: 1.45 L/SEC
FEFMAX-%PRED-PRE: 63 %
FEFMAX-PRE: 2.93 L/SEC
FEFMAX-PRED: 4.66 L/SEC
FEV1-%PRED-PRE: 42 %
FEV1-PRE: 0.74 L
FEV1FEV6-PRE: 62 %
FEV1FEV6-PRED: 78 %
FEV1FVC-PRE: 62 %
FEV1FVC-PRED: 78 %
FEV1SVC-PRE: 53 %
FEV1SVC-PRED: 62 %
FIFMAX-PRE: 2.95 L/SEC
FRCPLETH-%PRED-PRE: 122 %
FRCPLETH-PRE: 3.33 L
FRCPLETH-PRED: 2.73 L
FVC-%PRED-PRE: 53 %
FVC-PRE: 1.18 L
FVC-PRED: 2.22 L
HGB BLD-MCNC: 14.8 G/DL
IC-%PRED-PRE: 79 %
IC-PRE: 1.28 L
IC-PRED: 1.61 L
RVPLETH-%PRED-PRE: 153 %
RVPLETH-PRE: 3.23 L
RVPLETH-PRED: 2.1 L
TLCPLETH-%PRED-PRE: 100 %
TLCPLETH-PRE: 4.62 L
TLCPLETH-PRED: 4.6 L
VA-%PRED-PRE: 66 %
VA-PRE: 2.75 L
VC-%PRED-PRE: 50 %
VC-PRE: 1.39 L
VC-PRED: 2.77 L

## 2025-05-22 PROCEDURE — 3077F SYST BP >= 140 MM HG: CPT | Performed by: INTERNAL MEDICINE

## 2025-05-22 PROCEDURE — 99204 OFFICE O/P NEW MOD 45 MIN: CPT | Performed by: INTERNAL MEDICINE

## 2025-05-22 PROCEDURE — 3078F DIAST BP <80 MM HG: CPT | Performed by: INTERNAL MEDICINE

## 2025-05-22 PROCEDURE — G2211 COMPLEX E/M VISIT ADD ON: HCPCS | Performed by: INTERNAL MEDICINE

## 2025-05-22 RX ORDER — POLYVINYL ALCOHOL, POVIDONE 14; 6 MG/ML; MG/ML
SOLUTION/ DROPS OPHTHALMIC
COMMUNITY
Start: 2025-05-13

## 2025-05-22 RX ORDER — FLUTICASONE PROPIONATE AND SALMETEROL 113; 14 UG/1; UG/1
POWDER, METERED RESPIRATORY (INHALATION)
COMMUNITY
Start: 2025-05-20 | End: 2025-05-22

## 2025-05-22 RX ORDER — PREDNISONE 10 MG/1
TABLET ORAL
Qty: 15 TABLET | Refills: 0 | Status: SHIPPED | OUTPATIENT
Start: 2025-05-22 | End: 2025-06-01

## 2025-05-22 RX ORDER — FLUTICASONE FUROATE, UMECLIDINIUM BROMIDE AND VILANTEROL TRIFENATATE 100; 62.5; 25 UG/1; UG/1; UG/1
1 POWDER RESPIRATORY (INHALATION) DAILY
Qty: 30 EACH | Refills: 12 | Status: SHIPPED | OUTPATIENT
Start: 2025-05-22

## 2025-05-22 RX ORDER — ALBUTEROL SULFATE 90 UG/1
1-2 INHALANT RESPIRATORY (INHALATION) EVERY 6 HOURS PRN
Qty: 18 G | Refills: 12 | Status: SHIPPED | OUTPATIENT
Start: 2025-05-22

## 2025-05-22 RX ORDER — GABAPENTIN 300 MG/1
CAPSULE ORAL
COMMUNITY
Start: 2025-05-01

## 2025-05-22 RX ORDER — MEMANTINE HYDROCHLORIDE 10 MG/1
TABLET ORAL
COMMUNITY
Start: 2025-05-01

## 2025-05-22 RX ORDER — LOSARTAN POTASSIUM 25 MG/1
TABLET ORAL
COMMUNITY
Start: 2025-05-03

## 2025-05-22 RX ORDER — APIXABAN 5 MG/1
1 TABLET, FILM COATED ORAL
COMMUNITY
Start: 2025-05-01

## 2025-05-22 NOTE — PATIENT INSTRUCTIONS
Prednisone 20 mg daily for 5 days then 10 mg daily for 5 days   Change AIRDUO to TRELEGY, one puff daily, rinse your mouth after each use  ALBUTEROL HFA two puffs every six hours as needed, use it before activities   Follow up in 6 months

## 2025-05-22 NOTE — TELEPHONE ENCOUNTER
[facility] Call Center    Phone Message    May a detailed message be left on voicemail: yes     Reason for Call: Medication Question or concern regarding medication   Prescription Clarification  Name of Medication: Fluticasone-Umeclidin-Vilant (TRELEGY ELLIPTA) 100-62.5-25 MCG/ACT oral inhaler  Prescribing Provider: Jose M Hoffman MD   Pharmacy: [facility] [id number] [place] [address] [address] [facility]  P: [phone]  F: [phone]   What on the order needs clarification? [facility] pharmacy states provider prescribed Trelegy although pt is also on Advair, should they discontinue Advair? Please advise and call pharmacy back. P: [phone]       Action Taken: Other: PULM    Travel Screening: Not Applicable     Date of Service:

## 2025-05-22 NOTE — NURSING NOTE
Spacer ordered by Dr. Velasquez to use with their inhaler. Instructed patient on correct use and care of the spacer. Patient states good understanding of use and care of this spacer. Paperwork for [facility] signed by patient. Copy given to patient and one sent to scanning.

## 2025-05-22 NOTE — TELEPHONE ENCOUNTER
Spoke with [facility] pharmacy. Informed them if the Trelegy is approved by insurance, they can discontinue the Advair, per Dr. Velasquez.

## 2025-05-27 ENCOUNTER — TELEPHONE (OUTPATIENT)
Dept: PULMONOLOGY | Facility: CLINIC | Age: 80
End: 2025-05-27
Payer: COMMERCIAL

## 2025-05-27 NOTE — TELEPHONE ENCOUNTER
[facility] Call Center    Phone Message    May a detailed message be left on voicemail: yes     Reason for Call: Other: Nurse Jessi [phone]( patients skilled nursing facility called to confirm is Milly will be changing to just Trelegy elipta inhaler or will she be using the air as well? Thank you      Action Taken: Other: Pulm    Travel Screening: Not Applicable     Date of Service:

## 2025-05-27 NOTE — TELEPHONE ENCOUNTER
Attempted to call nurse Jessi back. Had to leave detailed vm to stop any other inhaler except the Trelegy and the albuterol rescue inhaler. Left the nurse line number to call back.

## 2025-06-22 NOTE — PROGRESS NOTES
PULMONARY OUTPATIENT CONSULT NOTE        Assessment:     COPD with acute exacerbation   Significant tobacco use.   PFTs showed a severe obstructive lung disease, FEV1 0.74 L (42%), moderate reduction of diffusion capacity.   CT scan showed emphysematous changes   Systemic steroids  Add LAMA to ICS/LABA. Continue albuterol HFA as needed  Pulmonary embolism   Chest CT scan 12/2024 showed few small segmental and subsegmental pulmonary emboli in the right middle and lower lobes.  History of venous thrombosis a few months earlier.   Plan for long term anticoagulation.    Hx venous thrombosis lower extremity.   Venous US Lower extremity 8/2024 showed a short segment DVT in a soleal vein in the mid calf. Patient was anticoagulated with apixaban for 2 months, follow up US 10/3/2024 and 10/15/2024 showed resolution of thrombosis.      Plan:      Prednisone 20 mg daily for 5 days then 10 mg daily for 5 days   Change AIRDUO to TRELEGY, one puff daily, rinse your mouth after each use  ALBUTEROL HFA two puffs every six hours as needed, use it before activities   Follow up in 6 months         Jose M Ragland  Pulmonary / Critical Care  May 22, 2025        CC:     Chief Complaint   Patient presents with    Consult     J44.9- Chronic obstructive pulmonary disease, unspecified COPD type  I26.99- Pulmonary embolism  R06.02- Shortness of breath  Referred by Ortiz Langford MD affiliated with [facility]; no recent imaging through [facility]          HPI:      Sharon Weinberg is a 80 year old female who presents for evaluation of COPD.   Patient has history of COPD, HTN, DVT 8/2024 and pulmonary embolism 12/2024, nephrolithiasis, DJD, s/p left hip ORIF  Venous US Lower extremity 8/2024 showed a short segment DVT in a soleal vein in the mid calf.   Patient was anticoagulated with apixaban for 2 months, follow up US 10/3/2024 and 10/15/2024 showed resolution of thrombosis.   Patient was hospitalized from  12/18 to 12/20/2024 for evaluation of shortness of breath.   Patient was hypoxic. Decrease breath sounds and expiratory wheezes on exam. Chest CT scan showed segmental and subsegmental pulmonary embolism, no lung infiltrates.   Started on treatment for COPD exacerbation and pulmonary embolism.     Patient is currently a resident at [facility].   Reports exertional dyspnea, able to walk 1/2 block. Difficulty climbing stairs. Hx hip fracture played a role in her limited activity.   Reports occasional cough, wheezes.   Denies history of asthma or outdoors allergies.   No fever, chills or night sweats.   Denies postnasal drip, headaches, or sinus tenderness.   Denies chest pain, orthopnea, PND, or swelling of legs  Tobacco user 1 ppd for 60 years plus, quit two years ago.   Family history , sister with lung cancer.         Past Medical History :     Past Medical History:   Diagnosis Date    Anxiety     Benign essential hypertension     COPD (chronic obstructive pulmonary disease) (H)     Depression     History of DVT (deep vein thrombosis) 08/2024    treated with Eliquis 8/2024 through 10/2024    Insomnia     Osteoporosis     Restless legs syndrome         Medications:     Current Outpatient Medications   Medication Sig Dispense Refill    acetaminophen (TYLENOL) 325 MG tablet Take 650 mg by mouth every 6 hours as needed for mild pain      albuterol (PROAIR HFA/PROVENTIL HFA/VENTOLIN HFA) 108 (90 Base) MCG/ACT inhaler Inhale 1-2 puffs into the lungs every 6 hours as needed for shortness of breath or cough. USE IT BEFORE ACTIVITIES 18 g 12    alendronate (FOSAMAX) 70 MG tablet Take 70 mg by mouth every 7 days      calcium carbonate 500 mg, elemental, (OSCAL) 500 MG tablet Take 1 tablet by mouth 2 times daily      carboxymethylcellulose PF (REFRESH PLUS) 0.5 % ophthalmic solution Place 1 drop into both eyes 3 times daily.      cycloSPORINE (RESTASIS) 0.05 % ophthalmic emulsion Place 1 drop into both eyes  every 12 hours      donepezil (ARICEPT) 5 MG tablet Take 5 mg by mouth at bedtime.      ELIQUIS ANTICOAGULANT 5 MG tablet Take 1 tablet by mouth 2 times daily.      escitalopram (LEXAPRO) 10 MG tablet Take 10 mg by mouth every morning.      Fluticasone-Umeclidin-Vilant (TRELEGY ELLIPTA) 100-62.5-25 MCG/ACT oral inhaler Inhale 1 puff into the lungs daily. 30 each 12    gabapentin (NEURONTIN) 300 MG capsule TAKE 1 CAPSULE BY MOUTH TWICE DAILY AT 5 PM AND AT 9 PM      LORazepam (ATIVAN) 0.5 MG tablet Take 0.5 mg by mouth at bedtime.      losartan (COZAAR) 25 MG tablet       memantine (NAMENDA) 10 MG tablet STARTING 5/9/25, TAKE 1 TABLET BY MOUTH TWICE DAILY      mirtazapine (REMERON) 30 MG tablet Take 30 mg by mouth At Bedtime      pramipexole (MIRAPEX) 0.25 MG tablet Take 0.25 mg by mouth at bedtime.      propranolol (INDERAL) 10 MG tablet Take 10 mg by mouth every morning. (Also taking 20 mg QPM with dinner)      propranolol (INDERAL) 10 MG tablet Take 20 mg by mouth daily (with dinner). (Also taking 10 mg QAM)      REFRESH 1.4-0.6 % ophthalmic solution INSTILL 1 DROP IN BOTH EYES THREE TIMES DAILY      Vitamin D3 (VITAMIN D, CHOLECALCIFEROL,) 25 mcg (1000 units) tablet Take 1 tablet by mouth every morning.       No current facility-administered medications for this visit.          Social History :     Social History     Socioeconomic History    Marital status:      Spouse name: Not on file    Number of children: Not on file    Years of education: Not on file    Highest education level: Not on file   Occupational History    Not on file   Tobacco Use    Smoking status: Former     Types: Cigarettes    Smokeless tobacco: Never   Substance and Sexual Activity    Alcohol use: Not on file    Drug use: Not on file    Sexual activity: Not on file   Other Topics Concern    Not on file   Social History Narrative    Not on file     Social Drivers of Health     Financial Resource Strain: High Risk (1/1/2022)    Received  "from [facility]    Financial Resource Strain     Difficulty of Paying Living Expenses: Not on file     Difficulty of Paying Living Expenses: Not on file   Food Insecurity: Not on file   Transportation Needs: Not on file   Physical Activity: Not on file   Stress: Not on file   Social Connections: Unknown (1/1/2022)    Received from [facility]    Social Connections     Frequency of Communication with Friends and Family: Not on file   Interpersonal Safety: Not on file   Housing Stability: Not on file          Family History :     Family History   Problem Relation Age of Onset    Coronary Artery Disease Father 90    Heart Disease Sister     Ovarian Cancer Daughter     Hereditary Breast and Ovarian Cancer Syndrome Daughter        Review of Systems  A 12 point comprehensive review of systems was negative except as noted.        Objective:     BP (!) 148/72 (BP Location: Left arm, Patient Position: Sitting, Cuff Size: Adult Large)   Pulse 77   Ht 1.575 m (5' 2\")   Wt 66.7 kg (147 lb)   SpO2 95%   BMI 26.89 kg/m        Gen: awake, alert, no distress  HEENT: pink conjunctiva, moist mucosa, Mallampati II/IV  Neck: no thyromegaly, masses or JVD  Lungs: discrete expiratory wheezes  CV: regular, no murmurs or gallops appreciated  Abdomen: soft, NT, BS wnl  Ext: no edema  Neuro: CN II-XII intact, non focal      Diagnostic tests:         PFTs:          IMAGES:      CT CHEST PULMONARY EMBOLISM W CONTRAST  LOCATION: [facility]  DATE: 12/18/2024  INDICATION: SOB, hypoxia, history of DVT.  COMPARISON: 8/8/2024.  FINDINGS:  ANGIOGRAM CHEST: There are a few small segmental and subsegmental pulmonary emboli within the right middle and lower lobes. Mild prominence of the central pulmonary arteries compatible with an element of pulmonary arterial hypertension.   LUNGS AND PLEURA: Emphysema. Small pleural effusions with mild bibasilar " infiltrates.  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  CORONARY ARTERY CALCIFICATION: Severe.  UPPER ABDOMEN: Normal.  MUSCULOSKELETAL: Normal.                                              IMPRESSION:  1.  There are a few small segmental and subsegmental pulmonary emboli in the right middle and lower lobes.  2.  Mild prominence of the central pulmonary arteries is stable compatible with an element of pulmonary arterial hypertension.  3.  Emphysema.  4.  Small pleural effusions. Mild bibasilar infiltrates.  5.  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

## 2025-08-06 ENCOUNTER — LAB REQUISITION (OUTPATIENT)
Dept: LAB | Facility: CLINIC | Age: 80
End: 2025-08-06
Payer: COMMERCIAL

## 2025-08-06 DIAGNOSIS — I26.99 OTHER PULMONARY EMBOLISM WITHOUT ACUTE COR PULMONALE (H): ICD-10-CM

## 2025-08-06 DIAGNOSIS — J44.9 CHRONIC OBSTRUCTIVE PULMONARY DISEASE, UNSPECIFIED (H): ICD-10-CM

## 2025-08-06 DIAGNOSIS — I10 ESSENTIAL (PRIMARY) HYPERTENSION: ICD-10-CM

## 2025-08-06 DIAGNOSIS — I73.9 PERIPHERAL VASCULAR DISEASE, UNSPECIFIED: ICD-10-CM

## 2025-08-07 LAB
ANION GAP SERPL CALCULATED.3IONS-SCNC: 10 MMOL/L (ref 7–15)
BUN SERPL-MCNC: 16.8 MG/DL (ref 8–23)
CALCIUM SERPL-MCNC: 9.2 MG/DL (ref 8.8–10.4)
CHLORIDE SERPL-SCNC: 106 MMOL/L (ref 98–107)
CREAT SERPL-MCNC: 0.78 MG/DL (ref 0.51–0.95)
EGFRCR SERPLBLD CKD-EPI 2021: 77 ML/MIN/1.73M2
ERYTHROCYTE [DISTWIDTH] IN BLOOD BY AUTOMATED COUNT: 12.6 % (ref 10–15)
GLUCOSE SERPL-MCNC: 89 MG/DL (ref 70–99)
HCO3 SERPL-SCNC: 27 MMOL/L (ref 22–29)
HCT VFR BLD AUTO: 42.4 % (ref 35–47)
HGB BLD-MCNC: 13.3 G/DL (ref 11.7–15.7)
MCH RBC QN AUTO: 31.7 PG (ref 26.5–33)
MCHC RBC AUTO-ENTMCNC: 31.4 G/DL (ref 31.5–36.5)
MCV RBC AUTO: 101 FL (ref 78–100)
PLATELET # BLD AUTO: 195 10E3/UL (ref 150–450)
POTASSIUM SERPL-SCNC: 4.3 MMOL/L (ref 3.4–5.3)
RBC # BLD AUTO: 4.19 10E6/UL (ref 3.8–5.2)
SODIUM SERPL-SCNC: 143 MMOL/L (ref 135–145)
WBC # BLD AUTO: 7.3 10E3/UL (ref 4–11)

## 2025-08-07 PROCEDURE — 85027 COMPLETE CBC AUTOMATED: CPT | Mod: ORL | Performed by: PHYSICIAN ASSISTANT

## 2025-08-07 PROCEDURE — 36415 COLL VENOUS BLD VENIPUNCTURE: CPT | Mod: ORL | Performed by: PHYSICIAN ASSISTANT

## 2025-08-07 PROCEDURE — P9604 ONE-WAY ALLOW PRORATED TRIP: HCPCS | Mod: ORL | Performed by: PHYSICIAN ASSISTANT

## 2025-08-07 PROCEDURE — 80048 BASIC METABOLIC PNL TOTAL CA: CPT | Mod: ORL | Performed by: PHYSICIAN ASSISTANT
